# Patient Record
Sex: FEMALE | Race: WHITE | Employment: UNEMPLOYED | URBAN - NONMETROPOLITAN AREA
[De-identification: names, ages, dates, MRNs, and addresses within clinical notes are randomized per-mention and may not be internally consistent; named-entity substitution may affect disease eponyms.]

---

## 2017-04-16 PROBLEM — R17 JAUNDICE: Status: ACTIVE | Noted: 2017-04-16

## 2017-04-16 PROBLEM — K83.09 ASCENDING CHOLANGITIS: Status: ACTIVE | Noted: 2017-04-16

## 2017-04-16 PROBLEM — J98.11 BILATERAL ATELECTASIS: Status: ACTIVE | Noted: 2017-04-16

## 2017-04-16 PROBLEM — N17.9 AKI (ACUTE KIDNEY INJURY) (HCC): Status: ACTIVE | Noted: 2017-04-16

## 2017-04-25 PROBLEM — R17 JAUNDICE: Status: RESOLVED | Noted: 2017-04-16 | Resolved: 2017-04-25

## 2017-04-25 PROBLEM — Z98.890 HISTORY OF CARDIAC RADIOFREQUENCY ABLATION: Status: ACTIVE | Noted: 2017-04-25

## 2017-05-17 ENCOUNTER — OFFICE VISIT (OUTPATIENT)
Age: 77
End: 2017-05-17

## 2017-05-17 VITALS
HEART RATE: 68 BPM | DIASTOLIC BLOOD PRESSURE: 62 MMHG | TEMPERATURE: 99.2 F | HEIGHT: 65 IN | RESPIRATION RATE: 24 BRPM | SYSTOLIC BLOOD PRESSURE: 120 MMHG | OXYGEN SATURATION: 96 %

## 2017-05-17 DIAGNOSIS — Z90.49 S/P LAPAROSCOPIC CHOLECYSTECTOMY: Primary | ICD-10-CM

## 2017-05-17 PROCEDURE — 99024 POSTOP FOLLOW-UP VISIT: CPT | Performed by: NURSE PRACTITIONER

## 2017-05-17 RX ORDER — HYDROCODONE BITARTRATE AND ACETAMINOPHEN 5; 325 MG/1; MG/1
1 TABLET ORAL EVERY 6 HOURS PRN
Status: ON HOLD | COMMUNITY
End: 2018-04-02

## 2017-05-17 ASSESSMENT — ENCOUNTER SYMPTOMS
ANAL BLEEDING: 0
EYE REDNESS: 0
APNEA: 0
SHORTNESS OF BREATH: 1
WHEEZING: 0
RECTAL PAIN: 0
COUGH: 0
COLOR CHANGE: 0
RHINORRHEA: 0
STRIDOR: 0
EYE ITCHING: 0
SORE THROAT: 0
FACIAL SWELLING: 0
CHOKING: 0
CHEST TIGHTNESS: 0
CONSTIPATION: 0
VOICE CHANGE: 0
EYE PAIN: 0
ABDOMINAL PAIN: 0
ABDOMINAL DISTENTION: 0
PHOTOPHOBIA: 0
EYE DISCHARGE: 0
SINUS PRESSURE: 0
BACK PAIN: 0
TROUBLE SWALLOWING: 0
BLOOD IN STOOL: 0
VOMITING: 0
DIARRHEA: 1

## 2018-03-27 ENCOUNTER — HOSPITAL ENCOUNTER (INPATIENT)
Age: 78
LOS: 6 days | Discharge: NURSING, REHAB OR LONG-TERM CARE | DRG: 193 | End: 2018-04-02
Attending: FAMILY MEDICINE | Admitting: PSYCHIATRY & NEUROLOGY
Payer: MEDICARE

## 2018-03-27 ENCOUNTER — APPOINTMENT (OUTPATIENT)
Dept: GENERAL RADIOLOGY | Age: 78
DRG: 193 | End: 2018-03-27
Payer: MEDICARE

## 2018-03-27 DIAGNOSIS — J11.1 INFLUENZA WITH RESPIRATORY MANIFESTATION OTHER THAN PNEUMONIA: Primary | ICD-10-CM

## 2018-03-27 DIAGNOSIS — G44.1 OTHER VASCULAR HEADACHE: ICD-10-CM

## 2018-03-27 DIAGNOSIS — L60.0 INGROWN NAIL: ICD-10-CM

## 2018-03-27 LAB
ALBUMIN SERPL-MCNC: 3.6 GM/DL (ref 3.4–5)
ALP BLD-CCNC: 93 U/L (ref 46–116)
ALT SERPL-CCNC: 27 U/L (ref 14–63)
ANION GAP: 6 MEQ/L (ref 8–16)
AST SERPL-CCNC: 25 U/L (ref 15–37)
BASOPHILS # BLD: 0.5 % (ref 0–3)
BILIRUB SERPL-MCNC: 0.9 MG/DL (ref 0.2–1)
BUN BLDV-MCNC: 16 MG/DL (ref 7–18)
CHLORIDE BLD-SCNC: 106 MEQ/L (ref 98–107)
CO2: 30 MEQ/L (ref 21–32)
CREAT SERPL-MCNC: 1.2 MG/DL (ref 0.6–1.3)
EOSINOPHILS RELATIVE PERCENT: 0.9 % (ref 0–4)
FLU A ANTIGEN: POSITIVE
FLU B ANTIGEN: NEGATIVE
GFR, ESTIMATED: 46 ML/MIN/1.73M2
GLUCOSE BLD-MCNC: 123 MG/DL (ref 74–106)
HCT VFR BLD CALC: 37.6 % (ref 37–47)
HEMOGLOBIN: 12.5 GM/DL (ref 12–16)
LACTATE: 0.85 MMOL/L (ref 0.9–1.7)
LYMPHOCYTES # BLD: 6.9 % (ref 15–47)
MCH RBC QN AUTO: 31.5 PG (ref 27–31)
MCHC RBC AUTO-ENTMCNC: 33.3 GM/DL (ref 33–37)
MCV RBC AUTO: 94.8 FL (ref 81–99)
MONOCYTES: 8.7 % (ref 0–12)
PDW BLD-RTO: 12.9 % (ref 11.5–14.5)
PLATELET # BLD: 140 THOU/MM3 (ref 130–400)
PMV BLD AUTO: 7.2 FL (ref 7.4–10.4)
POC CALCIUM: 8.9 MG/DL (ref 8.5–10.1)
POTASSIUM SERPL-SCNC: 4.4 MEQ/L (ref 3.5–5.1)
RBC # BLD: 3.96 MILL/MM3 (ref 4.2–5.4)
SEGS: 83 % (ref 43–75)
SODIUM BLD-SCNC: 142 MEQ/L (ref 136–145)
TOTAL PROTEIN: 7.6 GM/DL (ref 6.4–8.2)
TROPONIN I: < 0.017 NG/ML (ref 0.02–0.05)
WBC # BLD: 9.7 THOU/MM3 (ref 4.8–10.8)

## 2018-03-27 PROCEDURE — 85025 COMPLETE CBC W/AUTO DIFF WBC: CPT

## 2018-03-27 PROCEDURE — 80053 COMPREHEN METABOLIC PANEL: CPT

## 2018-03-27 PROCEDURE — 94640 AIRWAY INHALATION TREATMENT: CPT

## 2018-03-27 PROCEDURE — 6360000002 HC RX W HCPCS: Performed by: FAMILY MEDICINE

## 2018-03-27 PROCEDURE — 87804 INFLUENZA ASSAY W/OPTIC: CPT

## 2018-03-27 PROCEDURE — 6370000000 HC RX 637 (ALT 250 FOR IP): Performed by: FAMILY MEDICINE

## 2018-03-27 PROCEDURE — 71045 X-RAY EXAM CHEST 1 VIEW: CPT

## 2018-03-27 PROCEDURE — 87040 BLOOD CULTURE FOR BACTERIA: CPT

## 2018-03-27 PROCEDURE — 36415 COLL VENOUS BLD VENIPUNCTURE: CPT

## 2018-03-27 PROCEDURE — 1200000000 HC SEMI PRIVATE

## 2018-03-27 PROCEDURE — 84484 ASSAY OF TROPONIN QUANT: CPT

## 2018-03-27 PROCEDURE — 96374 THER/PROPH/DIAG INJ IV PUSH: CPT

## 2018-03-27 PROCEDURE — 99284 EMERGENCY DEPT VISIT MOD MDM: CPT

## 2018-03-27 PROCEDURE — 2580000003 HC RX 258: Performed by: FAMILY MEDICINE

## 2018-03-27 PROCEDURE — A4614 HAND-HELD PEFR METER: HCPCS

## 2018-03-27 PROCEDURE — 83605 ASSAY OF LACTIC ACID: CPT

## 2018-03-27 RX ORDER — ACETAMINOPHEN 325 MG/1
650 TABLET ORAL EVERY 4 HOURS PRN
Status: DISCONTINUED | OUTPATIENT
Start: 2018-03-27 | End: 2018-04-02 | Stop reason: HOSPADM

## 2018-03-27 RX ORDER — ACETAMINOPHEN 500 MG
1000 TABLET ORAL ONCE
Status: COMPLETED | OUTPATIENT
Start: 2018-03-27 | End: 2018-03-27

## 2018-03-27 RX ORDER — OSELTAMIVIR PHOSPHATE 30 MG/1
30 CAPSULE ORAL ONCE
Status: COMPLETED | OUTPATIENT
Start: 2018-03-27 | End: 2018-03-27

## 2018-03-27 RX ORDER — IPRATROPIUM BROMIDE AND ALBUTEROL SULFATE 2.5; .5 MG/3ML; MG/3ML
1 SOLUTION RESPIRATORY (INHALATION) ONCE
Status: COMPLETED | OUTPATIENT
Start: 2018-03-27 | End: 2018-03-27

## 2018-03-27 RX ORDER — METHYLPREDNISOLONE SODIUM SUCCINATE 125 MG/2ML
125 INJECTION, POWDER, LYOPHILIZED, FOR SOLUTION INTRAMUSCULAR; INTRAVENOUS ONCE
Status: COMPLETED | OUTPATIENT
Start: 2018-03-27 | End: 2018-03-27

## 2018-03-27 RX ORDER — 0.9 % SODIUM CHLORIDE 0.9 %
1000 INTRAVENOUS SOLUTION INTRAVENOUS ONCE
Status: COMPLETED | OUTPATIENT
Start: 2018-03-27 | End: 2018-03-27

## 2018-03-27 RX ADMIN — IPRATROPIUM BROMIDE AND ALBUTEROL SULFATE 1 AMPULE: .5; 3 SOLUTION RESPIRATORY (INHALATION) at 19:39

## 2018-03-27 RX ADMIN — IPRATROPIUM BROMIDE AND ALBUTEROL SULFATE 1 AMPULE: .5; 3 SOLUTION RESPIRATORY (INHALATION) at 18:30

## 2018-03-27 RX ADMIN — SODIUM CHLORIDE 1000 ML: 9 INJECTION, SOLUTION INTRAVENOUS at 21:07

## 2018-03-27 RX ADMIN — ACETAMINOPHEN 1000 MG: 500 TABLET ORAL at 18:14

## 2018-03-27 RX ADMIN — OSELTAMIVIR PHOSPHATE 30 MG: 30 CAPSULE ORAL at 21:10

## 2018-03-27 RX ADMIN — METHYLPREDNISOLONE SODIUM SUCCINATE 125 MG: 125 INJECTION, POWDER, FOR SOLUTION INTRAMUSCULAR; INTRAVENOUS at 18:25

## 2018-03-27 ASSESSMENT — ENCOUNTER SYMPTOMS
EYE DISCHARGE: 0
NAUSEA: 0
SHORTNESS OF BREATH: 0
COUGH: 1
BACK PAIN: 0
ABDOMINAL PAIN: 0
DIARRHEA: 0
EYE PAIN: 0
RHINORRHEA: 0
WHEEZING: 0
SORE THROAT: 0
VOMITING: 0

## 2018-03-27 ASSESSMENT — PAIN SCALES - GENERAL: PAINLEVEL_OUTOF10: 0

## 2018-03-28 ENCOUNTER — APPOINTMENT (OUTPATIENT)
Dept: CT IMAGING | Age: 78
DRG: 193 | End: 2018-03-28
Payer: MEDICARE

## 2018-03-28 LAB
ALLEN TEST: POSITIVE
BASE EXCESS (CALCULATED): -3 MMOL/L (ref -2.5–2.5)
COLLECTED BY:: ABNORMAL
DEVICE: ABNORMAL
HCO3: 22 MMOL/L (ref 23–28)
O2 SATURATION: 93 %
PCO2: 40 MMHG (ref 35–45)
PH BLOOD GAS: 7.35 (ref 7.35–7.45)
PO2: 72 MMHG (ref 71–104)
PROCALCITONIN: 0.17 NG/ML (ref 0.01–0.09)
SOURCE, BLOOD GAS: ABNORMAL

## 2018-03-28 PROCEDURE — 99223 1ST HOSP IP/OBS HIGH 75: CPT | Performed by: INTERNAL MEDICINE

## 2018-03-28 PROCEDURE — 84145 PROCALCITONIN (PCT): CPT

## 2018-03-28 PROCEDURE — 6370000000 HC RX 637 (ALT 250 FOR IP): Performed by: INTERNAL MEDICINE

## 2018-03-28 PROCEDURE — 1200000000 HC SEMI PRIVATE

## 2018-03-28 PROCEDURE — 2700000000 HC OXYGEN THERAPY PER DAY

## 2018-03-28 PROCEDURE — 94640 AIRWAY INHALATION TREATMENT: CPT

## 2018-03-28 PROCEDURE — 82803 BLOOD GASES ANY COMBINATION: CPT

## 2018-03-28 PROCEDURE — 36415 COLL VENOUS BLD VENIPUNCTURE: CPT

## 2018-03-28 PROCEDURE — 6360000002 HC RX W HCPCS: Performed by: NURSE PRACTITIONER

## 2018-03-28 PROCEDURE — 71250 CT THORAX DX C-: CPT

## 2018-03-28 PROCEDURE — APPSS45 APP SPLIT SHARED TIME 31-45 MINUTES: Performed by: NURSE PRACTITIONER

## 2018-03-28 PROCEDURE — 2580000003 HC RX 258: Performed by: INTERNAL MEDICINE

## 2018-03-28 PROCEDURE — 6360000002 HC RX W HCPCS: Performed by: INTERNAL MEDICINE

## 2018-03-28 PROCEDURE — A4614 HAND-HELD PEFR METER: HCPCS

## 2018-03-28 PROCEDURE — 36600 WITHDRAWAL OF ARTERIAL BLOOD: CPT

## 2018-03-28 PROCEDURE — 2500000003 HC RX 250 WO HCPCS: Performed by: INTERNAL MEDICINE

## 2018-03-28 PROCEDURE — 94669 MECHANICAL CHEST WALL OSCILL: CPT

## 2018-03-28 RX ORDER — HYDROCODONE BITARTRATE AND ACETAMINOPHEN 5; 325 MG/1; MG/1
1 TABLET ORAL EVERY 6 HOURS PRN
Status: DISCONTINUED | OUTPATIENT
Start: 2018-03-28 | End: 2018-04-02 | Stop reason: HOSPADM

## 2018-03-28 RX ORDER — METOPROLOL TARTRATE 75 MG/1
75 TABLET, FILM COATED ORAL 2 TIMES DAILY
Status: DISCONTINUED | OUTPATIENT
Start: 2018-03-28 | End: 2018-03-28 | Stop reason: SDUPTHER

## 2018-03-28 RX ORDER — POTASSIUM CHLORIDE 20 MEQ/1
20 TABLET, EXTENDED RELEASE ORAL DAILY
COMMUNITY

## 2018-03-28 RX ORDER — FOLIC ACID 1 MG/1
1 TABLET ORAL DAILY
Status: DISCONTINUED | OUTPATIENT
Start: 2018-03-28 | End: 2018-04-02 | Stop reason: HOSPADM

## 2018-03-28 RX ORDER — CITALOPRAM 20 MG/1
20 TABLET ORAL DAILY
Status: DISCONTINUED | OUTPATIENT
Start: 2018-03-28 | End: 2018-04-02 | Stop reason: HOSPADM

## 2018-03-28 RX ORDER — BUMETANIDE 1 MG/1
2 TABLET ORAL DAILY
Status: DISCONTINUED | OUTPATIENT
Start: 2018-03-28 | End: 2018-04-02

## 2018-03-28 RX ORDER — ASPIRIN 81 MG/1
81 TABLET ORAL DAILY
COMMUNITY

## 2018-03-28 RX ORDER — HYDRALAZINE HYDROCHLORIDE 50 MG/1
50 TABLET, FILM COATED ORAL EVERY 8 HOURS SCHEDULED
Status: DISCONTINUED | OUTPATIENT
Start: 2018-03-28 | End: 2018-04-02 | Stop reason: HOSPADM

## 2018-03-28 RX ORDER — DOCUSATE SODIUM 100 MG/1
100 CAPSULE, LIQUID FILLED ORAL 2 TIMES DAILY
Status: DISCONTINUED | OUTPATIENT
Start: 2018-03-28 | End: 2018-04-02 | Stop reason: HOSPADM

## 2018-03-28 RX ORDER — METHYLPREDNISOLONE SODIUM SUCCINATE 125 MG/2ML
80 INJECTION, POWDER, LYOPHILIZED, FOR SOLUTION INTRAMUSCULAR; INTRAVENOUS EVERY 8 HOURS
Status: DISCONTINUED | OUTPATIENT
Start: 2018-03-28 | End: 2018-03-28

## 2018-03-28 RX ORDER — ALLOPURINOL 100 MG/1
100 TABLET ORAL DAILY
Status: DISCONTINUED | OUTPATIENT
Start: 2018-03-28 | End: 2018-04-02 | Stop reason: HOSPADM

## 2018-03-28 RX ORDER — PRAVASTATIN SODIUM 40 MG
40 TABLET ORAL NIGHTLY
Status: DISCONTINUED | OUTPATIENT
Start: 2018-03-28 | End: 2018-04-02 | Stop reason: HOSPADM

## 2018-03-28 RX ORDER — POTASSIUM CHLORIDE 20 MEQ/1
20 TABLET, EXTENDED RELEASE ORAL DAILY
Status: DISCONTINUED | OUTPATIENT
Start: 2018-03-28 | End: 2018-04-02 | Stop reason: HOSPADM

## 2018-03-28 RX ORDER — OSELTAMIVIR PHOSPHATE 30 MG/1
30 CAPSULE ORAL 2 TIMES DAILY
Status: COMPLETED | OUTPATIENT
Start: 2018-03-28 | End: 2018-04-01

## 2018-03-28 RX ORDER — AMLODIPINE BESYLATE 10 MG/1
10 TABLET ORAL DAILY
Status: DISCONTINUED | OUTPATIENT
Start: 2018-03-28 | End: 2018-04-02 | Stop reason: HOSPADM

## 2018-03-28 RX ORDER — OXYBUTYNIN CHLORIDE 5 MG/1
5 TABLET ORAL 3 TIMES DAILY
Status: DISCONTINUED | OUTPATIENT
Start: 2018-03-28 | End: 2018-04-02 | Stop reason: HOSPADM

## 2018-03-28 RX ORDER — PANTOPRAZOLE SODIUM 40 MG/1
40 TABLET, DELAYED RELEASE ORAL
Status: DISCONTINUED | OUTPATIENT
Start: 2018-03-28 | End: 2018-04-02 | Stop reason: HOSPADM

## 2018-03-28 RX ORDER — CELECOXIB 200 MG/1
200 CAPSULE ORAL 2 TIMES DAILY
Status: DISCONTINUED | OUTPATIENT
Start: 2018-03-28 | End: 2018-04-02 | Stop reason: HOSPADM

## 2018-03-28 RX ORDER — LEVOTHYROXINE SODIUM 0.1 MG/1
200 TABLET ORAL DAILY
Status: DISCONTINUED | OUTPATIENT
Start: 2018-03-28 | End: 2018-04-02 | Stop reason: HOSPADM

## 2018-03-28 RX ORDER — LISINOPRIL 20 MG/1
20 TABLET ORAL 2 TIMES DAILY
Status: DISCONTINUED | OUTPATIENT
Start: 2018-03-28 | End: 2018-04-02 | Stop reason: HOSPADM

## 2018-03-28 RX ORDER — METHYLPREDNISOLONE SODIUM SUCCINATE 40 MG/ML
40 INJECTION, POWDER, LYOPHILIZED, FOR SOLUTION INTRAMUSCULAR; INTRAVENOUS EVERY 12 HOURS
Status: DISCONTINUED | OUTPATIENT
Start: 2018-03-28 | End: 2018-03-29

## 2018-03-28 RX ORDER — IPRATROPIUM BROMIDE AND ALBUTEROL SULFATE 2.5; .5 MG/3ML; MG/3ML
1 SOLUTION RESPIRATORY (INHALATION)
Status: DISCONTINUED | OUTPATIENT
Start: 2018-03-28 | End: 2018-04-02 | Stop reason: HOSPADM

## 2018-03-28 RX ADMIN — CELECOXIB 200 MG: 200 CAPSULE ORAL at 20:03

## 2018-03-28 RX ADMIN — HYDROCODONE BITARTRATE AND ACETAMINOPHEN 1 TABLET: 5; 325 TABLET ORAL at 02:55

## 2018-03-28 RX ADMIN — LEVOTHYROXINE SODIUM 200 MCG: 100 TABLET ORAL at 05:45

## 2018-03-28 RX ADMIN — MICONAZOLE NITRATE: 2 POWDER TOPICAL at 20:02

## 2018-03-28 RX ADMIN — HYDRALAZINE HYDROCHLORIDE 50 MG: 50 TABLET ORAL at 20:07

## 2018-03-28 RX ADMIN — METOPROLOL TARTRATE 75 MG: 25 TABLET ORAL at 02:55

## 2018-03-28 RX ADMIN — ALLOPURINOL 100 MG: 100 TABLET ORAL at 09:22

## 2018-03-28 RX ADMIN — IPRATROPIUM BROMIDE AND ALBUTEROL SULFATE 1 AMPULE: .5; 3 SOLUTION RESPIRATORY (INHALATION) at 16:13

## 2018-03-28 RX ADMIN — AZITHROMYCIN MONOHYDRATE 500 MG: 500 INJECTION, POWDER, LYOPHILIZED, FOR SOLUTION INTRAVENOUS at 04:28

## 2018-03-28 RX ADMIN — CITALOPRAM 20 MG: 20 TABLET, FILM COATED ORAL at 10:34

## 2018-03-28 RX ADMIN — DOCUSATE SODIUM 100 MG: 100 CAPSULE ORAL at 02:55

## 2018-03-28 RX ADMIN — HYDRALAZINE HYDROCHLORIDE 50 MG: 50 TABLET ORAL at 14:42

## 2018-03-28 RX ADMIN — POTASSIUM CHLORIDE 20 MEQ: 1500 TABLET, EXTENDED RELEASE ORAL at 09:23

## 2018-03-28 RX ADMIN — METOPROLOL TARTRATE 75 MG: 25 TABLET ORAL at 09:20

## 2018-03-28 RX ADMIN — IPRATROPIUM BROMIDE AND ALBUTEROL SULFATE 1 AMPULE: .5; 3 SOLUTION RESPIRATORY (INHALATION) at 20:27

## 2018-03-28 RX ADMIN — ENOXAPARIN SODIUM 40 MG: 40 INJECTION SUBCUTANEOUS at 09:20

## 2018-03-28 RX ADMIN — OXYBUTYNIN CHLORIDE 5 MG: 5 TABLET ORAL at 09:22

## 2018-03-28 RX ADMIN — DOCUSATE SODIUM 100 MG: 100 CAPSULE ORAL at 20:04

## 2018-03-28 RX ADMIN — METHYLPREDNISOLONE SODIUM SUCCINATE 40 MG: 40 INJECTION, POWDER, FOR SOLUTION INTRAMUSCULAR; INTRAVENOUS at 20:03

## 2018-03-28 RX ADMIN — LISINOPRIL 20 MG: 20 TABLET ORAL at 02:55

## 2018-03-28 RX ADMIN — IPRATROPIUM BROMIDE AND ALBUTEROL SULFATE 1 AMPULE: .5; 3 SOLUTION RESPIRATORY (INHALATION) at 11:50

## 2018-03-28 RX ADMIN — OXYBUTYNIN CHLORIDE 5 MG: 5 TABLET ORAL at 20:02

## 2018-03-28 RX ADMIN — AMLODIPINE BESYLATE 10 MG: 10 TABLET ORAL at 09:22

## 2018-03-28 RX ADMIN — PANTOPRAZOLE SODIUM 40 MG: 40 TABLET, DELAYED RELEASE ORAL at 05:45

## 2018-03-28 RX ADMIN — CEFTRIAXONE 1 G: 1 INJECTION, POWDER, FOR SOLUTION INTRAMUSCULAR; INTRAVENOUS at 03:57

## 2018-03-28 RX ADMIN — HYDRALAZINE HYDROCHLORIDE 50 MG: 50 TABLET ORAL at 09:22

## 2018-03-28 RX ADMIN — OSELTAMIVIR PHOSPHATE 30 MG: 30 CAPSULE ORAL at 09:23

## 2018-03-28 RX ADMIN — LISINOPRIL 20 MG: 20 TABLET ORAL at 09:22

## 2018-03-28 RX ADMIN — METOPROLOL TARTRATE 75 MG: 25 TABLET ORAL at 20:02

## 2018-03-28 RX ADMIN — CELECOXIB 200 MG: 200 CAPSULE ORAL at 09:23

## 2018-03-28 RX ADMIN — LISINOPRIL 20 MG: 20 TABLET ORAL at 20:02

## 2018-03-28 RX ADMIN — METHYLPREDNISOLONE SODIUM SUCCINATE 80 MG: 125 INJECTION, POWDER, FOR SOLUTION INTRAMUSCULAR; INTRAVENOUS at 09:25

## 2018-03-28 RX ADMIN — IPRATROPIUM BROMIDE AND ALBUTEROL SULFATE 1 AMPULE: .5; 3 SOLUTION RESPIRATORY (INHALATION) at 00:42

## 2018-03-28 RX ADMIN — OXYBUTYNIN CHLORIDE 5 MG: 5 TABLET ORAL at 14:42

## 2018-03-28 RX ADMIN — FOLIC ACID 1 MG: 1 TABLET ORAL at 09:22

## 2018-03-28 RX ADMIN — OSELTAMIVIR PHOSPHATE 30 MG: 30 CAPSULE ORAL at 20:02

## 2018-03-28 RX ADMIN — DOCUSATE SODIUM 100 MG: 100 CAPSULE ORAL at 09:22

## 2018-03-28 RX ADMIN — IPRATROPIUM BROMIDE AND ALBUTEROL SULFATE 1 AMPULE: .5; 3 SOLUTION RESPIRATORY (INHALATION) at 07:13

## 2018-03-28 RX ADMIN — MICONAZOLE NITRATE: 2 POWDER TOPICAL at 17:01

## 2018-03-28 RX ADMIN — PRAVASTATIN SODIUM 40 MG: 40 TABLET ORAL at 20:02

## 2018-03-28 RX ADMIN — BUMETANIDE 2 MG: 1 TABLET ORAL at 09:21

## 2018-03-28 ASSESSMENT — PAIN SCALES - GENERAL
PAINLEVEL_OUTOF10: 2
PAINLEVEL_OUTOF10: 0
PAINLEVEL_OUTOF10: 3
PAINLEVEL_OUTOF10: 4
PAINLEVEL_OUTOF10: 3
PAINLEVEL_OUTOF10: 0

## 2018-03-28 ASSESSMENT — PAIN DESCRIPTION - LOCATION
LOCATION: HEAD

## 2018-03-28 ASSESSMENT — PAIN DESCRIPTION - PAIN TYPE
TYPE: ACUTE PAIN

## 2018-03-29 PROBLEM — L60.0 INGROWN NAIL: Status: ACTIVE | Noted: 2018-03-29

## 2018-03-29 LAB
LV EF: 55 %
LVEF MODALITY: NORMAL

## 2018-03-29 PROCEDURE — G8978 MOBILITY CURRENT STATUS: HCPCS

## 2018-03-29 PROCEDURE — 94669 MECHANICAL CHEST WALL OSCILL: CPT

## 2018-03-29 PROCEDURE — 1200000000 HC SEMI PRIVATE

## 2018-03-29 PROCEDURE — 6370000000 HC RX 637 (ALT 250 FOR IP): Performed by: INTERNAL MEDICINE

## 2018-03-29 PROCEDURE — 93306 TTE W/DOPPLER COMPLETE: CPT

## 2018-03-29 PROCEDURE — G8988 SELF CARE GOAL STATUS: HCPCS

## 2018-03-29 PROCEDURE — 2580000003 HC RX 258: Performed by: INTERNAL MEDICINE

## 2018-03-29 PROCEDURE — G8979 MOBILITY GOAL STATUS: HCPCS

## 2018-03-29 PROCEDURE — 97161 PT EVAL LOW COMPLEX 20 MIN: CPT

## 2018-03-29 PROCEDURE — G8987 SELF CARE CURRENT STATUS: HCPCS

## 2018-03-29 PROCEDURE — 97535 SELF CARE MNGMENT TRAINING: CPT

## 2018-03-29 PROCEDURE — APPSS30 APP SPLIT SHARED TIME 16-30 MINUTES: Performed by: NURSE PRACTITIONER

## 2018-03-29 PROCEDURE — 99232 SBSQ HOSP IP/OBS MODERATE 35: CPT | Performed by: INTERNAL MEDICINE

## 2018-03-29 PROCEDURE — 97166 OT EVAL MOD COMPLEX 45 MIN: CPT

## 2018-03-29 PROCEDURE — 6360000002 HC RX W HCPCS: Performed by: INTERNAL MEDICINE

## 2018-03-29 PROCEDURE — 2700000000 HC OXYGEN THERAPY PER DAY

## 2018-03-29 PROCEDURE — 94640 AIRWAY INHALATION TREATMENT: CPT

## 2018-03-29 PROCEDURE — 6360000002 HC RX W HCPCS: Performed by: NURSE PRACTITIONER

## 2018-03-29 PROCEDURE — 97110 THERAPEUTIC EXERCISES: CPT

## 2018-03-29 RX ORDER — LIDOCAINE HYDROCHLORIDE 10 MG/ML
5 INJECTION, SOLUTION INFILTRATION; PERINEURAL ONCE
Status: DISCONTINUED | OUTPATIENT
Start: 2018-03-30 | End: 2018-04-02 | Stop reason: HOSPADM

## 2018-03-29 RX ORDER — PREDNISONE 20 MG/1
40 TABLET ORAL DAILY
Status: DISCONTINUED | OUTPATIENT
Start: 2018-03-30 | End: 2018-04-02 | Stop reason: HOSPADM

## 2018-03-29 RX ADMIN — BUMETANIDE 2 MG: 1 TABLET ORAL at 09:25

## 2018-03-29 RX ADMIN — CITALOPRAM 20 MG: 20 TABLET, FILM COATED ORAL at 09:27

## 2018-03-29 RX ADMIN — DOCUSATE SODIUM 100 MG: 100 CAPSULE ORAL at 09:26

## 2018-03-29 RX ADMIN — AMLODIPINE BESYLATE 10 MG: 10 TABLET ORAL at 09:26

## 2018-03-29 RX ADMIN — METHYLPREDNISOLONE SODIUM SUCCINATE 40 MG: 40 INJECTION, POWDER, FOR SOLUTION INTRAMUSCULAR; INTRAVENOUS at 09:27

## 2018-03-29 RX ADMIN — CEFTRIAXONE 1 G: 1 INJECTION, POWDER, FOR SOLUTION INTRAMUSCULAR; INTRAVENOUS at 02:15

## 2018-03-29 RX ADMIN — IPRATROPIUM BROMIDE AND ALBUTEROL SULFATE 1 AMPULE: .5; 3 SOLUTION RESPIRATORY (INHALATION) at 16:22

## 2018-03-29 RX ADMIN — IPRATROPIUM BROMIDE AND ALBUTEROL SULFATE 1 AMPULE: .5; 3 SOLUTION RESPIRATORY (INHALATION) at 12:31

## 2018-03-29 RX ADMIN — PRAVASTATIN SODIUM 40 MG: 40 TABLET ORAL at 22:35

## 2018-03-29 RX ADMIN — DOCUSATE SODIUM 100 MG: 100 CAPSULE ORAL at 22:31

## 2018-03-29 RX ADMIN — OXYBUTYNIN CHLORIDE 5 MG: 5 TABLET ORAL at 09:27

## 2018-03-29 RX ADMIN — AZITHROMYCIN MONOHYDRATE 500 MG: 500 INJECTION, POWDER, LYOPHILIZED, FOR SOLUTION INTRAVENOUS at 02:48

## 2018-03-29 RX ADMIN — MICONAZOLE NITRATE: 2 POWDER TOPICAL at 22:35

## 2018-03-29 RX ADMIN — HYDRALAZINE HYDROCHLORIDE 50 MG: 50 TABLET ORAL at 14:32

## 2018-03-29 RX ADMIN — ALLOPURINOL 100 MG: 100 TABLET ORAL at 09:27

## 2018-03-29 RX ADMIN — HYDRALAZINE HYDROCHLORIDE 50 MG: 50 TABLET ORAL at 06:10

## 2018-03-29 RX ADMIN — HYDRALAZINE HYDROCHLORIDE 50 MG: 50 TABLET ORAL at 22:31

## 2018-03-29 RX ADMIN — LISINOPRIL 20 MG: 20 TABLET ORAL at 22:31

## 2018-03-29 RX ADMIN — CELECOXIB 200 MG: 200 CAPSULE ORAL at 09:28

## 2018-03-29 RX ADMIN — OXYBUTYNIN CHLORIDE 5 MG: 5 TABLET ORAL at 14:31

## 2018-03-29 RX ADMIN — LEVOTHYROXINE SODIUM 200 MCG: 100 TABLET ORAL at 06:10

## 2018-03-29 RX ADMIN — OSELTAMIVIR PHOSPHATE 30 MG: 30 CAPSULE ORAL at 09:26

## 2018-03-29 RX ADMIN — OXYBUTYNIN CHLORIDE 5 MG: 5 TABLET ORAL at 22:31

## 2018-03-29 RX ADMIN — LISINOPRIL 20 MG: 20 TABLET ORAL at 09:27

## 2018-03-29 RX ADMIN — HYDROCODONE BITARTRATE AND ACETAMINOPHEN 1 TABLET: 5; 325 TABLET ORAL at 22:31

## 2018-03-29 RX ADMIN — CELECOXIB 200 MG: 200 CAPSULE ORAL at 22:32

## 2018-03-29 RX ADMIN — OSELTAMIVIR PHOSPHATE 30 MG: 30 CAPSULE ORAL at 22:31

## 2018-03-29 RX ADMIN — MICONAZOLE NITRATE 1 APPLICATOR: 2 POWDER TOPICAL at 09:27

## 2018-03-29 RX ADMIN — METOPROLOL TARTRATE 75 MG: 25 TABLET ORAL at 09:21

## 2018-03-29 RX ADMIN — ENOXAPARIN SODIUM 40 MG: 40 INJECTION SUBCUTANEOUS at 09:32

## 2018-03-29 RX ADMIN — IPRATROPIUM BROMIDE AND ALBUTEROL SULFATE 1 AMPULE: .5; 3 SOLUTION RESPIRATORY (INHALATION) at 07:27

## 2018-03-29 RX ADMIN — FOLIC ACID 1 MG: 1 TABLET ORAL at 09:27

## 2018-03-29 RX ADMIN — IPRATROPIUM BROMIDE AND ALBUTEROL SULFATE 1 AMPULE: .5; 3 SOLUTION RESPIRATORY (INHALATION) at 20:25

## 2018-03-29 RX ADMIN — POTASSIUM CHLORIDE 20 MEQ: 1500 TABLET, EXTENDED RELEASE ORAL at 09:25

## 2018-03-29 RX ADMIN — METOPROLOL TARTRATE 75 MG: 25 TABLET ORAL at 22:31

## 2018-03-29 RX ADMIN — PANTOPRAZOLE SODIUM 40 MG: 40 TABLET, DELAYED RELEASE ORAL at 06:10

## 2018-03-29 ASSESSMENT — PAIN SCALES - GENERAL
PAINLEVEL_OUTOF10: 6
PAINLEVEL_OUTOF10: 3
PAINLEVEL_OUTOF10: 6
PAINLEVEL_OUTOF10: 6
PAINLEVEL_OUTOF10: 3
PAINLEVEL_OUTOF10: 3

## 2018-03-29 ASSESSMENT — PAIN DESCRIPTION - LOCATION: LOCATION: HEAD

## 2018-03-29 ASSESSMENT — PAIN DESCRIPTION - PAIN TYPE: TYPE: ACUTE PAIN

## 2018-03-30 LAB
ANION GAP SERPL CALCULATED.3IONS-SCNC: 12 MEQ/L (ref 8–16)
BUN BLDV-MCNC: 24 MG/DL (ref 7–22)
CALCIUM SERPL-MCNC: 8.8 MG/DL (ref 8.5–10.5)
CHLORIDE BLD-SCNC: 102 MEQ/L (ref 98–111)
CO2: 28 MEQ/L (ref 23–33)
CREAT SERPL-MCNC: 1 MG/DL (ref 0.4–1.2)
GFR SERPL CREATININE-BSD FRML MDRD: 53 ML/MIN/1.73M2
GLUCOSE BLD-MCNC: 113 MG/DL (ref 70–108)
POTASSIUM SERPL-SCNC: 3.5 MEQ/L (ref 3.5–5.2)
SODIUM BLD-SCNC: 142 MEQ/L (ref 135–145)

## 2018-03-30 PROCEDURE — 94669 MECHANICAL CHEST WALL OSCILL: CPT

## 2018-03-30 PROCEDURE — 6360000002 HC RX W HCPCS: Performed by: INTERNAL MEDICINE

## 2018-03-30 PROCEDURE — 6370000000 HC RX 637 (ALT 250 FOR IP): Performed by: INTERNAL MEDICINE

## 2018-03-30 PROCEDURE — 0HDRXZZ EXTRACTION OF TOE NAIL, EXTERNAL APPROACH: ICD-10-PCS | Performed by: INTERNAL MEDICINE

## 2018-03-30 PROCEDURE — 1200000000 HC SEMI PRIVATE

## 2018-03-30 PROCEDURE — 80048 BASIC METABOLIC PNL TOTAL CA: CPT

## 2018-03-30 PROCEDURE — 36415 COLL VENOUS BLD VENIPUNCTURE: CPT

## 2018-03-30 PROCEDURE — 2580000003 HC RX 258: Performed by: INTERNAL MEDICINE

## 2018-03-30 PROCEDURE — 94640 AIRWAY INHALATION TREATMENT: CPT

## 2018-03-30 PROCEDURE — 99232 SBSQ HOSP IP/OBS MODERATE 35: CPT | Performed by: INTERNAL MEDICINE

## 2018-03-30 PROCEDURE — 2700000000 HC OXYGEN THERAPY PER DAY

## 2018-03-30 RX ADMIN — IPRATROPIUM BROMIDE AND ALBUTEROL SULFATE 1 AMPULE: .5; 3 SOLUTION RESPIRATORY (INHALATION) at 12:49

## 2018-03-30 RX ADMIN — PANTOPRAZOLE SODIUM 40 MG: 40 TABLET, DELAYED RELEASE ORAL at 10:24

## 2018-03-30 RX ADMIN — POTASSIUM CHLORIDE 20 MEQ: 1500 TABLET, EXTENDED RELEASE ORAL at 10:25

## 2018-03-30 RX ADMIN — OSELTAMIVIR PHOSPHATE 30 MG: 30 CAPSULE ORAL at 10:24

## 2018-03-30 RX ADMIN — IPRATROPIUM BROMIDE AND ALBUTEROL SULFATE 1 AMPULE: .5; 3 SOLUTION RESPIRATORY (INHALATION) at 07:51

## 2018-03-30 RX ADMIN — PREDNISONE 40 MG: 20 TABLET ORAL at 10:24

## 2018-03-30 RX ADMIN — CELECOXIB 200 MG: 200 CAPSULE ORAL at 20:49

## 2018-03-30 RX ADMIN — IPRATROPIUM BROMIDE AND ALBUTEROL SULFATE 1 AMPULE: .5; 3 SOLUTION RESPIRATORY (INHALATION) at 16:31

## 2018-03-30 RX ADMIN — HYDRALAZINE HYDROCHLORIDE 50 MG: 50 TABLET ORAL at 16:00

## 2018-03-30 RX ADMIN — OXYBUTYNIN CHLORIDE 5 MG: 5 TABLET ORAL at 20:48

## 2018-03-30 RX ADMIN — LEVOTHYROXINE SODIUM 200 MCG: 100 TABLET ORAL at 10:24

## 2018-03-30 RX ADMIN — HYDROCODONE BITARTRATE AND ACETAMINOPHEN 1 TABLET: 5; 325 TABLET ORAL at 20:54

## 2018-03-30 RX ADMIN — AZITHROMYCIN MONOHYDRATE 500 MG: 500 INJECTION, POWDER, LYOPHILIZED, FOR SOLUTION INTRAVENOUS at 05:24

## 2018-03-30 RX ADMIN — METOPROLOL TARTRATE 75 MG: 25 TABLET ORAL at 10:25

## 2018-03-30 RX ADMIN — IPRATROPIUM BROMIDE AND ALBUTEROL SULFATE 1 AMPULE: .5; 3 SOLUTION RESPIRATORY (INHALATION) at 21:40

## 2018-03-30 RX ADMIN — ENOXAPARIN SODIUM 40 MG: 40 INJECTION SUBCUTANEOUS at 10:26

## 2018-03-30 RX ADMIN — DOCUSATE SODIUM 100 MG: 100 CAPSULE ORAL at 10:24

## 2018-03-30 RX ADMIN — DOCUSATE SODIUM 100 MG: 100 CAPSULE ORAL at 20:48

## 2018-03-30 RX ADMIN — HYDRALAZINE HYDROCHLORIDE 50 MG: 50 TABLET ORAL at 20:48

## 2018-03-30 RX ADMIN — BUMETANIDE 2 MG: 1 TABLET ORAL at 10:25

## 2018-03-30 RX ADMIN — FOLIC ACID 1 MG: 1 TABLET ORAL at 10:24

## 2018-03-30 RX ADMIN — CITALOPRAM 20 MG: 20 TABLET, FILM COATED ORAL at 10:25

## 2018-03-30 RX ADMIN — OXYBUTYNIN CHLORIDE 5 MG: 5 TABLET ORAL at 16:00

## 2018-03-30 RX ADMIN — CELECOXIB 200 MG: 200 CAPSULE ORAL at 10:25

## 2018-03-30 RX ADMIN — PRAVASTATIN SODIUM 40 MG: 40 TABLET ORAL at 20:48

## 2018-03-30 RX ADMIN — CEFTRIAXONE 1 G: 1 INJECTION, POWDER, FOR SOLUTION INTRAMUSCULAR; INTRAVENOUS at 03:53

## 2018-03-30 RX ADMIN — MICONAZOLE NITRATE: 2 POWDER TOPICAL at 10:27

## 2018-03-30 RX ADMIN — ALLOPURINOL 100 MG: 100 TABLET ORAL at 10:24

## 2018-03-30 RX ADMIN — AMLODIPINE BESYLATE 10 MG: 10 TABLET ORAL at 10:25

## 2018-03-30 RX ADMIN — METOPROLOL TARTRATE 75 MG: 25 TABLET ORAL at 20:48

## 2018-03-30 RX ADMIN — MICONAZOLE NITRATE: 2 POWDER TOPICAL at 20:49

## 2018-03-30 RX ADMIN — LISINOPRIL 20 MG: 20 TABLET ORAL at 20:48

## 2018-03-30 RX ADMIN — OSELTAMIVIR PHOSPHATE 30 MG: 30 CAPSULE ORAL at 20:48

## 2018-03-30 RX ADMIN — LISINOPRIL 20 MG: 20 TABLET ORAL at 10:25

## 2018-03-30 RX ADMIN — OXYBUTYNIN CHLORIDE 5 MG: 5 TABLET ORAL at 10:25

## 2018-03-30 RX ADMIN — HYDRALAZINE HYDROCHLORIDE 50 MG: 50 TABLET ORAL at 11:00

## 2018-03-30 ASSESSMENT — PAIN SCALES - GENERAL
PAINLEVEL_OUTOF10: 0
PAINLEVEL_OUTOF10: 0
PAINLEVEL_OUTOF10: 4
PAINLEVEL_OUTOF10: 0

## 2018-03-31 PROCEDURE — 6360000002 HC RX W HCPCS: Performed by: INTERNAL MEDICINE

## 2018-03-31 PROCEDURE — 6370000000 HC RX 637 (ALT 250 FOR IP): Performed by: INTERNAL MEDICINE

## 2018-03-31 PROCEDURE — 94669 MECHANICAL CHEST WALL OSCILL: CPT

## 2018-03-31 PROCEDURE — 6370000000 HC RX 637 (ALT 250 FOR IP): Performed by: PODIATRIST

## 2018-03-31 PROCEDURE — 94640 AIRWAY INHALATION TREATMENT: CPT

## 2018-03-31 PROCEDURE — 99232 SBSQ HOSP IP/OBS MODERATE 35: CPT | Performed by: INTERNAL MEDICINE

## 2018-03-31 PROCEDURE — 94760 N-INVAS EAR/PLS OXIMETRY 1: CPT

## 2018-03-31 PROCEDURE — 2700000000 HC OXYGEN THERAPY PER DAY

## 2018-03-31 PROCEDURE — 1200000000 HC SEMI PRIVATE

## 2018-03-31 RX ORDER — GUAIFENESIN 600 MG/1
600 TABLET, EXTENDED RELEASE ORAL 2 TIMES DAILY
Status: DISCONTINUED | OUTPATIENT
Start: 2018-03-31 | End: 2018-04-02 | Stop reason: HOSPADM

## 2018-03-31 RX ADMIN — HYDRALAZINE HYDROCHLORIDE 50 MG: 50 TABLET ORAL at 16:02

## 2018-03-31 RX ADMIN — PANTOPRAZOLE SODIUM 40 MG: 40 TABLET, DELAYED RELEASE ORAL at 09:01

## 2018-03-31 RX ADMIN — ALLOPURINOL 100 MG: 100 TABLET ORAL at 09:01

## 2018-03-31 RX ADMIN — MICONAZOLE NITRATE: 2 POWDER TOPICAL at 22:31

## 2018-03-31 RX ADMIN — ENOXAPARIN SODIUM 40 MG: 40 INJECTION SUBCUTANEOUS at 09:02

## 2018-03-31 RX ADMIN — OSELTAMIVIR PHOSPHATE 30 MG: 30 CAPSULE ORAL at 21:52

## 2018-03-31 RX ADMIN — LISINOPRIL 20 MG: 20 TABLET ORAL at 21:51

## 2018-03-31 RX ADMIN — LISINOPRIL 20 MG: 20 TABLET ORAL at 09:01

## 2018-03-31 RX ADMIN — FOLIC ACID 1 MG: 1 TABLET ORAL at 09:01

## 2018-03-31 RX ADMIN — CELECOXIB 200 MG: 200 CAPSULE ORAL at 21:52

## 2018-03-31 RX ADMIN — HYDRALAZINE HYDROCHLORIDE 50 MG: 50 TABLET ORAL at 21:51

## 2018-03-31 RX ADMIN — LEVOTHYROXINE SODIUM 200 MCG: 100 TABLET ORAL at 05:47

## 2018-03-31 RX ADMIN — GUAIFENESIN 600 MG: 600 TABLET, EXTENDED RELEASE ORAL at 21:51

## 2018-03-31 RX ADMIN — OXYBUTYNIN CHLORIDE 5 MG: 5 TABLET ORAL at 21:51

## 2018-03-31 RX ADMIN — HYDRALAZINE HYDROCHLORIDE 50 MG: 50 TABLET ORAL at 05:47

## 2018-03-31 RX ADMIN — MICONAZOLE NITRATE: 2 POWDER TOPICAL at 09:02

## 2018-03-31 RX ADMIN — PRAVASTATIN SODIUM 40 MG: 40 TABLET ORAL at 22:30

## 2018-03-31 RX ADMIN — BUMETANIDE 2 MG: 1 TABLET ORAL at 09:01

## 2018-03-31 RX ADMIN — GUAIFENESIN 600 MG: 600 TABLET, EXTENDED RELEASE ORAL at 16:02

## 2018-03-31 RX ADMIN — PREDNISONE 40 MG: 20 TABLET ORAL at 09:01

## 2018-03-31 RX ADMIN — HYDROCODONE BITARTRATE AND ACETAMINOPHEN 1 TABLET: 5; 325 TABLET ORAL at 21:52

## 2018-03-31 RX ADMIN — MUPIROCIN: 20 OINTMENT TOPICAL at 09:03

## 2018-03-31 RX ADMIN — OSELTAMIVIR PHOSPHATE 30 MG: 30 CAPSULE ORAL at 09:01

## 2018-03-31 RX ADMIN — IPRATROPIUM BROMIDE AND ALBUTEROL SULFATE 1 AMPULE: .5; 3 SOLUTION RESPIRATORY (INHALATION) at 08:17

## 2018-03-31 RX ADMIN — CELECOXIB 200 MG: 200 CAPSULE ORAL at 09:02

## 2018-03-31 RX ADMIN — IPRATROPIUM BROMIDE AND ALBUTEROL SULFATE 1 AMPULE: .5; 3 SOLUTION RESPIRATORY (INHALATION) at 16:06

## 2018-03-31 RX ADMIN — OXYBUTYNIN CHLORIDE 5 MG: 5 TABLET ORAL at 09:01

## 2018-03-31 RX ADMIN — OXYBUTYNIN CHLORIDE 5 MG: 5 TABLET ORAL at 15:00

## 2018-03-31 RX ADMIN — METOPROLOL TARTRATE 75 MG: 25 TABLET ORAL at 22:30

## 2018-03-31 RX ADMIN — AMLODIPINE BESYLATE 10 MG: 10 TABLET ORAL at 09:01

## 2018-03-31 RX ADMIN — METOPROLOL TARTRATE 75 MG: 25 TABLET ORAL at 09:01

## 2018-03-31 RX ADMIN — IPRATROPIUM BROMIDE AND ALBUTEROL SULFATE 1 AMPULE: .5; 3 SOLUTION RESPIRATORY (INHALATION) at 13:08

## 2018-03-31 RX ADMIN — DOCUSATE SODIUM 100 MG: 100 CAPSULE ORAL at 21:51

## 2018-03-31 RX ADMIN — DOCUSATE SODIUM 100 MG: 100 CAPSULE ORAL at 09:01

## 2018-03-31 RX ADMIN — POTASSIUM CHLORIDE 20 MEQ: 1500 TABLET, EXTENDED RELEASE ORAL at 09:01

## 2018-03-31 RX ADMIN — CITALOPRAM 20 MG: 20 TABLET, FILM COATED ORAL at 09:01

## 2018-03-31 RX ADMIN — IPRATROPIUM BROMIDE AND ALBUTEROL SULFATE 1 AMPULE: .5; 3 SOLUTION RESPIRATORY (INHALATION) at 19:44

## 2018-03-31 RX ADMIN — Medication 5 ML: at 22:30

## 2018-03-31 ASSESSMENT — PAIN SCALES - GENERAL
PAINLEVEL_OUTOF10: 4
PAINLEVEL_OUTOF10: 4
PAINLEVEL_OUTOF10: 2

## 2018-03-31 ASSESSMENT — PAIN DESCRIPTION - LOCATION: LOCATION: TOE (COMMENT WHICH ONE)

## 2018-03-31 ASSESSMENT — PAIN DESCRIPTION - PAIN TYPE: TYPE: ACUTE PAIN

## 2018-04-01 PROCEDURE — 94640 AIRWAY INHALATION TREATMENT: CPT

## 2018-04-01 PROCEDURE — 6370000000 HC RX 637 (ALT 250 FOR IP): Performed by: INTERNAL MEDICINE

## 2018-04-01 PROCEDURE — 6370000000 HC RX 637 (ALT 250 FOR IP): Performed by: PODIATRIST

## 2018-04-01 PROCEDURE — 1200000000 HC SEMI PRIVATE

## 2018-04-01 PROCEDURE — 99232 SBSQ HOSP IP/OBS MODERATE 35: CPT | Performed by: INTERNAL MEDICINE

## 2018-04-01 RX ADMIN — IPRATROPIUM BROMIDE AND ALBUTEROL SULFATE 1 AMPULE: .5; 3 SOLUTION RESPIRATORY (INHALATION) at 09:38

## 2018-04-01 RX ADMIN — IPRATROPIUM BROMIDE AND ALBUTEROL SULFATE 1 AMPULE: .5; 3 SOLUTION RESPIRATORY (INHALATION) at 17:35

## 2018-04-01 RX ADMIN — LEVOTHYROXINE SODIUM 200 MCG: 100 TABLET ORAL at 09:11

## 2018-04-01 RX ADMIN — MICONAZOLE NITRATE: 2 POWDER TOPICAL at 09:14

## 2018-04-01 RX ADMIN — OXYBUTYNIN CHLORIDE 5 MG: 5 TABLET ORAL at 09:12

## 2018-04-01 RX ADMIN — PREDNISONE 40 MG: 20 TABLET ORAL at 09:11

## 2018-04-01 RX ADMIN — ALLOPURINOL 100 MG: 100 TABLET ORAL at 09:12

## 2018-04-01 RX ADMIN — CELECOXIB 200 MG: 200 CAPSULE ORAL at 09:11

## 2018-04-01 RX ADMIN — POTASSIUM CHLORIDE 20 MEQ: 1500 TABLET, EXTENDED RELEASE ORAL at 09:12

## 2018-04-01 RX ADMIN — HYDRALAZINE HYDROCHLORIDE 50 MG: 50 TABLET ORAL at 21:08

## 2018-04-01 RX ADMIN — CITALOPRAM 20 MG: 20 TABLET, FILM COATED ORAL at 09:11

## 2018-04-01 RX ADMIN — AMLODIPINE BESYLATE 10 MG: 10 TABLET ORAL at 09:11

## 2018-04-01 RX ADMIN — OXYBUTYNIN CHLORIDE 5 MG: 5 TABLET ORAL at 15:59

## 2018-04-01 RX ADMIN — PRAVASTATIN SODIUM 40 MG: 40 TABLET ORAL at 21:08

## 2018-04-01 RX ADMIN — OSELTAMIVIR PHOSPHATE 30 MG: 30 CAPSULE ORAL at 09:11

## 2018-04-01 RX ADMIN — HYDROCODONE BITARTRATE AND ACETAMINOPHEN 1 TABLET: 5; 325 TABLET ORAL at 21:08

## 2018-04-01 RX ADMIN — HYDRALAZINE HYDROCHLORIDE 50 MG: 50 TABLET ORAL at 15:59

## 2018-04-01 RX ADMIN — CELECOXIB 200 MG: 200 CAPSULE ORAL at 21:08

## 2018-04-01 RX ADMIN — IPRATROPIUM BROMIDE AND ALBUTEROL SULFATE 1 AMPULE: .5; 3 SOLUTION RESPIRATORY (INHALATION) at 13:56

## 2018-04-01 RX ADMIN — GUAIFENESIN 600 MG: 600 TABLET, EXTENDED RELEASE ORAL at 21:08

## 2018-04-01 RX ADMIN — FOLIC ACID 1 MG: 1 TABLET ORAL at 09:11

## 2018-04-01 RX ADMIN — OXYBUTYNIN CHLORIDE 5 MG: 5 TABLET ORAL at 21:08

## 2018-04-01 RX ADMIN — DOCUSATE SODIUM 100 MG: 100 CAPSULE ORAL at 21:08

## 2018-04-01 RX ADMIN — METOPROLOL TARTRATE 75 MG: 25 TABLET ORAL at 09:11

## 2018-04-01 RX ADMIN — GUAIFENESIN 600 MG: 600 TABLET, EXTENDED RELEASE ORAL at 09:12

## 2018-04-01 RX ADMIN — PANTOPRAZOLE SODIUM 40 MG: 40 TABLET, DELAYED RELEASE ORAL at 09:11

## 2018-04-01 RX ADMIN — BUMETANIDE 2 MG: 1 TABLET ORAL at 09:11

## 2018-04-01 RX ADMIN — METOPROLOL TARTRATE 75 MG: 25 TABLET ORAL at 21:08

## 2018-04-01 RX ADMIN — HYDRALAZINE HYDROCHLORIDE 50 MG: 50 TABLET ORAL at 09:11

## 2018-04-01 RX ADMIN — LISINOPRIL 20 MG: 20 TABLET ORAL at 09:11

## 2018-04-01 RX ADMIN — LISINOPRIL 20 MG: 20 TABLET ORAL at 21:08

## 2018-04-01 RX ADMIN — MICONAZOLE NITRATE: 2 POWDER TOPICAL at 21:23

## 2018-04-01 RX ADMIN — MUPIROCIN: 20 OINTMENT TOPICAL at 09:14

## 2018-04-01 ASSESSMENT — PAIN SCALES - GENERAL
PAINLEVEL_OUTOF10: 0
PAINLEVEL_OUTOF10: 0
PAINLEVEL_OUTOF10: 6
PAINLEVEL_OUTOF10: 4
PAINLEVEL_OUTOF10: 0
PAINLEVEL_OUTOF10: 4

## 2018-04-01 ASSESSMENT — PAIN DESCRIPTION - DESCRIPTORS: DESCRIPTORS: DULL;DISCOMFORT;ACHING

## 2018-04-01 ASSESSMENT — PAIN DESCRIPTION - ORIENTATION: ORIENTATION: RIGHT

## 2018-04-01 ASSESSMENT — PAIN DESCRIPTION - LOCATION: LOCATION: RIB CAGE

## 2018-04-02 VITALS
DIASTOLIC BLOOD PRESSURE: 68 MMHG | HEIGHT: 66 IN | BODY MASS INDEX: 37.41 KG/M2 | HEART RATE: 77 BPM | OXYGEN SATURATION: 93 % | SYSTOLIC BLOOD PRESSURE: 120 MMHG | TEMPERATURE: 98.5 F | WEIGHT: 232.8 LBS | RESPIRATION RATE: 20 BRPM

## 2018-04-02 LAB
ANION GAP SERPL CALCULATED.3IONS-SCNC: 10 MEQ/L (ref 8–16)
BLOOD CULTURE, ROUTINE: NORMAL
BLOOD CULTURE, ROUTINE: NORMAL
BUN BLDV-MCNC: 40 MG/DL (ref 7–22)
CALCIUM SERPL-MCNC: 9.1 MG/DL (ref 8.5–10.5)
CHLORIDE BLD-SCNC: 97 MEQ/L (ref 98–111)
CO2: 35 MEQ/L (ref 23–33)
CREAT SERPL-MCNC: 1.4 MG/DL (ref 0.4–1.2)
GFR SERPL CREATININE-BSD FRML MDRD: 36 ML/MIN/1.73M2
GLUCOSE BLD-MCNC: 108 MG/DL (ref 70–108)
POTASSIUM SERPL-SCNC: 3.8 MEQ/L (ref 3.5–5.2)
SODIUM BLD-SCNC: 142 MEQ/L (ref 135–145)

## 2018-04-02 PROCEDURE — 6370000000 HC RX 637 (ALT 250 FOR IP): Performed by: INTERNAL MEDICINE

## 2018-04-02 PROCEDURE — 94640 AIRWAY INHALATION TREATMENT: CPT

## 2018-04-02 PROCEDURE — 80048 BASIC METABOLIC PNL TOTAL CA: CPT

## 2018-04-02 PROCEDURE — 6360000002 HC RX W HCPCS: Performed by: INTERNAL MEDICINE

## 2018-04-02 PROCEDURE — 99238 HOSP IP/OBS DSCHRG MGMT 30/<: CPT | Performed by: INTERNAL MEDICINE

## 2018-04-02 PROCEDURE — 94669 MECHANICAL CHEST WALL OSCILL: CPT

## 2018-04-02 PROCEDURE — 2700000000 HC OXYGEN THERAPY PER DAY

## 2018-04-02 PROCEDURE — 36415 COLL VENOUS BLD VENIPUNCTURE: CPT

## 2018-04-02 RX ORDER — GUAIFENESIN 600 MG/1
600 TABLET, EXTENDED RELEASE ORAL 2 TIMES DAILY
DISCHARGE
Start: 2018-04-02 | End: 2018-04-07

## 2018-04-02 RX ORDER — BUMETANIDE 1 MG/1
1 TABLET ORAL DAILY
Status: ON HOLD | DISCHARGE
Start: 2018-04-02 | End: 2018-11-18 | Stop reason: HOSPADM

## 2018-04-02 RX ORDER — HYDROCODONE BITARTRATE AND ACETAMINOPHEN 5; 325 MG/1; MG/1
1 TABLET ORAL EVERY 6 HOURS PRN
Qty: 12 TABLET | Refills: 0 | Status: SHIPPED | OUTPATIENT
Start: 2018-04-02 | End: 2018-05-02

## 2018-04-02 RX ORDER — BUMETANIDE 1 MG/1
1 TABLET ORAL DAILY
Status: DISCONTINUED | OUTPATIENT
Start: 2018-04-02 | End: 2018-04-02 | Stop reason: HOSPADM

## 2018-04-02 RX ORDER — IPRATROPIUM BROMIDE AND ALBUTEROL SULFATE 2.5; .5 MG/3ML; MG/3ML
3 SOLUTION RESPIRATORY (INHALATION)
Qty: 360 ML | DISCHARGE
Start: 2018-04-02 | End: 2018-04-07

## 2018-04-02 RX ORDER — PREDNISONE 10 MG/1
TABLET ORAL
Qty: 30 TABLET | Refills: 0 | Status: ON HOLD | DISCHARGE
Start: 2018-04-02 | End: 2018-11-14 | Stop reason: ALTCHOICE

## 2018-04-02 RX ADMIN — BUMETANIDE 1 MG: 1 TABLET ORAL at 08:21

## 2018-04-02 RX ADMIN — CITALOPRAM 20 MG: 20 TABLET, FILM COATED ORAL at 08:21

## 2018-04-02 RX ADMIN — PANTOPRAZOLE SODIUM 40 MG: 40 TABLET, DELAYED RELEASE ORAL at 08:21

## 2018-04-02 RX ADMIN — LISINOPRIL 20 MG: 20 TABLET ORAL at 08:21

## 2018-04-02 RX ADMIN — FOLIC ACID 1 MG: 1 TABLET ORAL at 08:21

## 2018-04-02 RX ADMIN — CELECOXIB 200 MG: 200 CAPSULE ORAL at 08:21

## 2018-04-02 RX ADMIN — MICONAZOLE NITRATE: 2 POWDER TOPICAL at 08:24

## 2018-04-02 RX ADMIN — POTASSIUM CHLORIDE 20 MEQ: 1500 TABLET, EXTENDED RELEASE ORAL at 08:21

## 2018-04-02 RX ADMIN — AMLODIPINE BESYLATE 10 MG: 10 TABLET ORAL at 08:21

## 2018-04-02 RX ADMIN — METOPROLOL TARTRATE 75 MG: 25 TABLET ORAL at 08:21

## 2018-04-02 RX ADMIN — PREDNISONE 40 MG: 20 TABLET ORAL at 08:21

## 2018-04-02 RX ADMIN — LEVOTHYROXINE SODIUM 200 MCG: 100 TABLET ORAL at 05:36

## 2018-04-02 RX ADMIN — GUAIFENESIN 600 MG: 600 TABLET, EXTENDED RELEASE ORAL at 08:21

## 2018-04-02 RX ADMIN — HYDRALAZINE HYDROCHLORIDE 50 MG: 50 TABLET ORAL at 05:36

## 2018-04-02 RX ADMIN — ENOXAPARIN SODIUM 40 MG: 40 INJECTION SUBCUTANEOUS at 08:21

## 2018-04-02 RX ADMIN — IPRATROPIUM BROMIDE AND ALBUTEROL SULFATE 1 AMPULE: .5; 3 SOLUTION RESPIRATORY (INHALATION) at 13:28

## 2018-04-02 RX ADMIN — IPRATROPIUM BROMIDE AND ALBUTEROL SULFATE 1 AMPULE: .5; 3 SOLUTION RESPIRATORY (INHALATION) at 10:09

## 2018-04-02 RX ADMIN — DOCUSATE SODIUM 100 MG: 100 CAPSULE ORAL at 08:21

## 2018-04-02 RX ADMIN — OXYBUTYNIN CHLORIDE 5 MG: 5 TABLET ORAL at 08:21

## 2018-04-02 RX ADMIN — ALLOPURINOL 100 MG: 100 TABLET ORAL at 08:21

## 2018-04-02 ASSESSMENT — PAIN SCALES - GENERAL
PAINLEVEL_OUTOF10: 0
PAINLEVEL_OUTOF10: 0

## 2018-04-30 ENCOUNTER — TELEPHONE (OUTPATIENT)
Dept: PULMONOLOGY | Age: 78
End: 2018-04-30

## 2018-04-30 DIAGNOSIS — J44.9 CHRONIC OBSTRUCTIVE PULMONARY DISEASE, UNSPECIFIED COPD TYPE (HCC): Primary | ICD-10-CM

## 2018-05-01 ENCOUNTER — HOSPITAL ENCOUNTER (OUTPATIENT)
Dept: PULMONOLOGY | Age: 78
Discharge: HOME | End: 2018-05-01
Payer: MEDICARE

## 2018-05-01 DIAGNOSIS — J44.9 CHRONIC OBSTRUCTIVE PULMONARY DISEASE, UNSPECIFIED COPD TYPE (HCC): ICD-10-CM

## 2018-11-14 ENCOUNTER — APPOINTMENT (OUTPATIENT)
Dept: CT IMAGING | Age: 78
DRG: 308 | End: 2018-11-14
Payer: MEDICARE

## 2018-11-14 ENCOUNTER — APPOINTMENT (OUTPATIENT)
Dept: INTERVENTIONAL RADIOLOGY/VASCULAR | Age: 78
DRG: 308 | End: 2018-11-14
Payer: MEDICARE

## 2018-11-14 ENCOUNTER — HOSPITAL ENCOUNTER (INPATIENT)
Age: 78
LOS: 4 days | Discharge: NURSING, REHAB OR LONG-TERM CARE | DRG: 308 | End: 2018-11-18
Attending: INTERNAL MEDICINE | Admitting: INTERNAL MEDICINE
Payer: MEDICARE

## 2018-11-14 ENCOUNTER — APPOINTMENT (OUTPATIENT)
Dept: GENERAL RADIOLOGY | Age: 78
DRG: 308 | End: 2018-11-14
Payer: MEDICARE

## 2018-11-14 DIAGNOSIS — I50.9 CONGESTIVE HEART FAILURE, UNSPECIFIED HF CHRONICITY, UNSPECIFIED HEART FAILURE TYPE (HCC): Primary | ICD-10-CM

## 2018-11-14 DIAGNOSIS — I48.91 NEW ONSET ATRIAL FIBRILLATION (HCC): ICD-10-CM

## 2018-11-14 LAB
ALBUMIN SERPL-MCNC: 4.1 G/DL (ref 3.5–5.1)
ALLEN TEST: POSITIVE
ALP BLD-CCNC: 86 U/L (ref 38–126)
ALT SERPL-CCNC: 32 U/L (ref 11–66)
AMYLASE: 44 U/L (ref 20–104)
ANION GAP SERPL CALCULATED.3IONS-SCNC: 15 MEQ/L (ref 8–16)
APTT: 34.3 SECONDS (ref 22–38)
APTT: > 200 SECONDS (ref 22–38)
AST SERPL-CCNC: 29 U/L (ref 5–40)
BASE EXCESS (CALCULATED): -1.9 MMOL/L (ref -2.5–2.5)
BASE EXCESS (CALCULATED): -3.3 MMOL/L (ref -2.5–2.5)
BASOPHILS # BLD: 0.5 %
BASOPHILS ABSOLUTE: 0.1 THOU/MM3 (ref 0–0.1)
BILIRUB SERPL-MCNC: 0.8 MG/DL (ref 0.3–1.2)
BILIRUBIN DIRECT: < 0.2 MG/DL (ref 0–0.3)
BILIRUBIN URINE: NEGATIVE
BLOOD, URINE: NEGATIVE
BUN BLDV-MCNC: 33 MG/DL (ref 7–22)
CALCIUM SERPL-MCNC: 9.4 MG/DL (ref 8.5–10.5)
CHARACTER, URINE: CLEAR
CHLORIDE BLD-SCNC: 107 MEQ/L (ref 98–111)
CO2: 20 MEQ/L (ref 23–33)
COLLECTED BY:: ABNORMAL
COLLECTED BY:: ABNORMAL
COLOR: YELLOW
CREAT SERPL-MCNC: 0.9 MG/DL (ref 0.4–1.2)
D-DIMER QUANTITATIVE: 2089 NG/ML FEU (ref 0–500)
DEVICE: ABNORMAL
DEVICE: ABNORMAL
EKG ATRIAL RATE: 58 BPM
EKG Q-T INTERVAL: 360 MS
EKG QRS DURATION: 74 MS
EKG QTC CALCULATION (BAZETT): 402 MS
EKG R AXIS: 101 DEGREES
EKG T AXIS: 36 DEGREES
EKG VENTRICULAR RATE: 75 BPM
EOSINOPHIL # BLD: 0.2 %
EOSINOPHILS ABSOLUTE: 0 THOU/MM3 (ref 0–0.4)
ERYTHROCYTE [DISTWIDTH] IN BLOOD BY AUTOMATED COUNT: 15.3 % (ref 11.5–14.5)
ERYTHROCYTE [DISTWIDTH] IN BLOOD BY AUTOMATED COUNT: 54.5 FL (ref 35–45)
FLU A ANTIGEN: NEGATIVE
FLU B ANTIGEN: NEGATIVE
GFR SERPL CREATININE-BSD FRML MDRD: 60 ML/MIN/1.73M2
GLUCOSE BLD-MCNC: 135 MG/DL (ref 70–108)
GLUCOSE URINE: NEGATIVE MG/DL
HCO3: 24 MMOL/L (ref 23–28)
HCO3: 24 MMOL/L (ref 23–28)
HCT VFR BLD CALC: 42.8 % (ref 37–47)
HEMOGLOBIN: 13.9 GM/DL (ref 12–16)
IFIO2: 70
IMMATURE GRANS (ABS): 0.41 THOU/MM3 (ref 0–0.07)
IMMATURE GRANULOCYTES: 2.4 %
INR BLD: 1.15 (ref 0.85–1.13)
KETONES, URINE: NEGATIVE
LEUKOCYTE ESTERASE, URINE: NEGATIVE
LIPASE: 14.4 U/L (ref 5.6–51.3)
LYMPHOCYTES # BLD: 10.6 %
LYMPHOCYTES ABSOLUTE: 1.8 THOU/MM3 (ref 1–4.8)
MCH RBC QN AUTO: 31.8 PG (ref 26–33)
MCHC RBC AUTO-ENTMCNC: 32.5 GM/DL (ref 32.2–35.5)
MCV RBC AUTO: 97.9 FL (ref 81–99)
MONOCYTES # BLD: 5.4 %
MONOCYTES ABSOLUTE: 0.9 THOU/MM3 (ref 0.4–1.3)
NITRITE, URINE: NEGATIVE
NUCLEATED RED BLOOD CELLS: 0 /100 WBC
O2 SATURATION: 96 %
O2 SATURATION: 99 %
OSMOLALITY CALCULATION: 292.4 MOSMOL/KG (ref 275–300)
PCO2: 44 MMHG (ref 35–45)
PCO2: 48 MMHG (ref 35–45)
PH BLOOD GAS: 7.3 (ref 7.35–7.45)
PH BLOOD GAS: 7.34 (ref 7.35–7.45)
PH UA: 5
PLATELET # BLD: 198 THOU/MM3 (ref 130–400)
PMV BLD AUTO: 10.3 FL (ref 9.4–12.4)
PO2: 162 MMHG (ref 71–104)
PO2: 94 MMHG (ref 71–104)
POTASSIUM SERPL-SCNC: 4.8 MEQ/L (ref 3.5–5.2)
PRO-BNP: 3495 PG/ML (ref 0–1800)
PROTEIN UA: NEGATIVE
RBC # BLD: 4.37 MILL/MM3 (ref 4.2–5.4)
SEG NEUTROPHILS: 80.9 %
SEGMENTED NEUTROPHILS ABSOLUTE COUNT: 13.7 THOU/MM3 (ref 1.8–7.7)
SODIUM BLD-SCNC: 142 MEQ/L (ref 135–145)
SOURCE, BLOOD GAS: ABNORMAL
SOURCE, BLOOD GAS: ABNORMAL
SPECIFIC GRAVITY, URINE: 1.02 (ref 1–1.03)
T4 FREE: 2.31 NG/DL (ref 0.93–1.76)
TOTAL PROTEIN: 7.3 G/DL (ref 6.1–8)
TROPONIN T: < 0.01 NG/ML
TSH SERPL DL<=0.05 MIU/L-ACNC: 0.41 UIU/ML (ref 0.4–4.2)
UROBILINOGEN, URINE: 0.2 EU/DL
WBC # BLD: 16.9 THOU/MM3 (ref 4.8–10.8)

## 2018-11-14 PROCEDURE — 71275 CT ANGIOGRAPHY CHEST: CPT

## 2018-11-14 PROCEDURE — 93970 EXTREMITY STUDY: CPT

## 2018-11-14 PROCEDURE — 2709999900 HC NON-CHARGEABLE SUPPLY

## 2018-11-14 PROCEDURE — 2500000003 HC RX 250 WO HCPCS: Performed by: INTERNAL MEDICINE

## 2018-11-14 PROCEDURE — 2580000003 HC RX 258: Performed by: INTERNAL MEDICINE

## 2018-11-14 PROCEDURE — 85730 THROMBOPLASTIN TIME PARTIAL: CPT

## 2018-11-14 PROCEDURE — 83880 ASSAY OF NATRIURETIC PEPTIDE: CPT

## 2018-11-14 PROCEDURE — 83690 ASSAY OF LIPASE: CPT

## 2018-11-14 PROCEDURE — 36600 WITHDRAWAL OF ARTERIAL BLOOD: CPT

## 2018-11-14 PROCEDURE — 6360000002 HC RX W HCPCS: Performed by: INTERNAL MEDICINE

## 2018-11-14 PROCEDURE — 93010 ELECTROCARDIOGRAM REPORT: CPT | Performed by: INTERNAL MEDICINE

## 2018-11-14 PROCEDURE — 82803 BLOOD GASES ANY COMBINATION: CPT

## 2018-11-14 PROCEDURE — 87040 BLOOD CULTURE FOR BACTERIA: CPT

## 2018-11-14 PROCEDURE — 2700000000 HC OXYGEN THERAPY PER DAY

## 2018-11-14 PROCEDURE — 96374 THER/PROPH/DIAG INJ IV PUSH: CPT

## 2018-11-14 PROCEDURE — 85610 PROTHROMBIN TIME: CPT

## 2018-11-14 PROCEDURE — 82248 BILIRUBIN DIRECT: CPT

## 2018-11-14 PROCEDURE — 93005 ELECTROCARDIOGRAM TRACING: CPT | Performed by: EMERGENCY MEDICINE

## 2018-11-14 PROCEDURE — 87804 INFLUENZA ASSAY W/OPTIC: CPT

## 2018-11-14 PROCEDURE — 96375 TX/PRO/DX INJ NEW DRUG ADDON: CPT

## 2018-11-14 PROCEDURE — 6360000004 HC RX CONTRAST MEDICATION: Performed by: INTERNAL MEDICINE

## 2018-11-14 PROCEDURE — 36415 COLL VENOUS BLD VENIPUNCTURE: CPT

## 2018-11-14 PROCEDURE — 85025 COMPLETE CBC W/AUTO DIFF WBC: CPT

## 2018-11-14 PROCEDURE — 85379 FIBRIN DEGRADATION QUANT: CPT

## 2018-11-14 PROCEDURE — 84484 ASSAY OF TROPONIN QUANT: CPT

## 2018-11-14 PROCEDURE — 94640 AIRWAY INHALATION TREATMENT: CPT

## 2018-11-14 PROCEDURE — 84439 ASSAY OF FREE THYROXINE: CPT

## 2018-11-14 PROCEDURE — 6370000000 HC RX 637 (ALT 250 FOR IP): Performed by: INTERNAL MEDICINE

## 2018-11-14 PROCEDURE — 81003 URINALYSIS AUTO W/O SCOPE: CPT

## 2018-11-14 PROCEDURE — 99285 EMERGENCY DEPT VISIT HI MDM: CPT

## 2018-11-14 PROCEDURE — 82150 ASSAY OF AMYLASE: CPT

## 2018-11-14 PROCEDURE — 94660 CPAP INITIATION&MGMT: CPT

## 2018-11-14 PROCEDURE — 80053 COMPREHEN METABOLIC PANEL: CPT

## 2018-11-14 PROCEDURE — 2140000000 HC CCU INTERMEDIATE R&B

## 2018-11-14 PROCEDURE — 84443 ASSAY THYROID STIM HORMONE: CPT

## 2018-11-14 PROCEDURE — 71045 X-RAY EXAM CHEST 1 VIEW: CPT

## 2018-11-14 RX ORDER — LISINOPRIL 20 MG/1
20 TABLET ORAL 2 TIMES DAILY
Status: DISCONTINUED | OUTPATIENT
Start: 2018-11-14 | End: 2018-11-17

## 2018-11-14 RX ORDER — PANTOPRAZOLE SODIUM 40 MG/1
40 TABLET, DELAYED RELEASE ORAL
Status: DISCONTINUED | OUTPATIENT
Start: 2018-11-15 | End: 2018-11-18 | Stop reason: HOSPADM

## 2018-11-14 RX ORDER — HEPARIN SODIUM 1000 [USP'U]/ML
80 INJECTION, SOLUTION INTRAVENOUS; SUBCUTANEOUS PRN
Status: DISCONTINUED | OUTPATIENT
Start: 2018-11-14 | End: 2018-11-16

## 2018-11-14 RX ORDER — HEPARIN SODIUM 10000 [USP'U]/100ML
18 INJECTION, SOLUTION INTRAVENOUS CONTINUOUS
Status: DISCONTINUED | OUTPATIENT
Start: 2018-11-14 | End: 2018-11-16

## 2018-11-14 RX ORDER — BUMETANIDE 1 MG/1
1 TABLET ORAL DAILY
Status: DISCONTINUED | OUTPATIENT
Start: 2018-11-15 | End: 2018-11-16

## 2018-11-14 RX ORDER — ASPIRIN 81 MG/1
81 TABLET ORAL 2 TIMES DAILY
Status: DISCONTINUED | OUTPATIENT
Start: 2018-11-14 | End: 2018-11-18 | Stop reason: HOSPADM

## 2018-11-14 RX ORDER — ATORVASTATIN CALCIUM 40 MG/1
40 TABLET, FILM COATED ORAL NIGHTLY
Status: DISCONTINUED | OUTPATIENT
Start: 2018-11-14 | End: 2018-11-14 | Stop reason: SDUPTHER

## 2018-11-14 RX ORDER — HYDRALAZINE HYDROCHLORIDE 50 MG/1
50 TABLET, FILM COATED ORAL EVERY 8 HOURS SCHEDULED
Status: DISCONTINUED | OUTPATIENT
Start: 2018-11-14 | End: 2018-11-17

## 2018-11-14 RX ORDER — HEPARIN SODIUM 1000 [USP'U]/ML
80 INJECTION, SOLUTION INTRAVENOUS; SUBCUTANEOUS ONCE
Status: COMPLETED | OUTPATIENT
Start: 2018-11-14 | End: 2018-11-14

## 2018-11-14 RX ORDER — ASPIRIN 81 MG/1
81 TABLET, CHEWABLE ORAL DAILY
Status: DISCONTINUED | OUTPATIENT
Start: 2018-11-15 | End: 2018-11-14 | Stop reason: SDUPTHER

## 2018-11-14 RX ORDER — CITALOPRAM 20 MG/1
20 TABLET ORAL DAILY
Status: DISCONTINUED | OUTPATIENT
Start: 2018-11-15 | End: 2018-11-18 | Stop reason: HOSPADM

## 2018-11-14 RX ORDER — HEPARIN SODIUM 1000 [USP'U]/ML
40 INJECTION, SOLUTION INTRAVENOUS; SUBCUTANEOUS PRN
Status: DISCONTINUED | OUTPATIENT
Start: 2018-11-14 | End: 2018-11-16

## 2018-11-14 RX ORDER — POTASSIUM CHLORIDE 20MEQ/15ML
40 LIQUID (ML) ORAL PRN
Status: DISCONTINUED | OUTPATIENT
Start: 2018-11-14 | End: 2018-11-18 | Stop reason: HOSPADM

## 2018-11-14 RX ORDER — NITROGLYCERIN 20 MG/100ML
5 INJECTION INTRAVENOUS CONTINUOUS
Status: DISCONTINUED | OUTPATIENT
Start: 2018-11-14 | End: 2018-11-17

## 2018-11-14 RX ORDER — CELECOXIB 200 MG/1
200 CAPSULE ORAL DAILY
Status: DISCONTINUED | OUTPATIENT
Start: 2018-11-15 | End: 2018-11-18 | Stop reason: HOSPADM

## 2018-11-14 RX ORDER — IPRATROPIUM BROMIDE AND ALBUTEROL SULFATE 2.5; .5 MG/3ML; MG/3ML
1 SOLUTION RESPIRATORY (INHALATION) ONCE
Status: COMPLETED | OUTPATIENT
Start: 2018-11-14 | End: 2018-11-14

## 2018-11-14 RX ORDER — POTASSIUM CHLORIDE 7.45 MG/ML
10 INJECTION INTRAVENOUS PRN
Status: DISCONTINUED | OUTPATIENT
Start: 2018-11-14 | End: 2018-11-18 | Stop reason: HOSPADM

## 2018-11-14 RX ORDER — FOLIC ACID 1 MG/1
1 TABLET ORAL DAILY
Status: DISCONTINUED | OUTPATIENT
Start: 2018-11-14 | End: 2018-11-18 | Stop reason: HOSPADM

## 2018-11-14 RX ORDER — ACETAMINOPHEN 325 MG/1
650 TABLET ORAL EVERY 4 HOURS PRN
Status: DISCONTINUED | OUTPATIENT
Start: 2018-11-14 | End: 2018-11-18 | Stop reason: HOSPADM

## 2018-11-14 RX ORDER — SODIUM CHLORIDE 0.9 % (FLUSH) 0.9 %
10 SYRINGE (ML) INJECTION PRN
Status: DISCONTINUED | OUTPATIENT
Start: 2018-11-14 | End: 2018-11-18 | Stop reason: HOSPADM

## 2018-11-14 RX ORDER — DOCUSATE SODIUM 100 MG/1
100 CAPSULE, LIQUID FILLED ORAL 2 TIMES DAILY PRN
Status: DISCONTINUED | OUTPATIENT
Start: 2018-11-14 | End: 2018-11-18 | Stop reason: HOSPADM

## 2018-11-14 RX ORDER — FUROSEMIDE 10 MG/ML
40 INJECTION INTRAMUSCULAR; INTRAVENOUS ONCE
Status: COMPLETED | OUTPATIENT
Start: 2018-11-14 | End: 2018-11-14

## 2018-11-14 RX ORDER — LEVOTHYROXINE SODIUM 0.1 MG/1
200 TABLET ORAL DAILY
Status: DISCONTINUED | OUTPATIENT
Start: 2018-11-15 | End: 2018-11-18 | Stop reason: HOSPADM

## 2018-11-14 RX ORDER — SODIUM CHLORIDE 0.9 % (FLUSH) 0.9 %
10 SYRINGE (ML) INJECTION EVERY 12 HOURS SCHEDULED
Status: DISCONTINUED | OUTPATIENT
Start: 2018-11-14 | End: 2018-11-18 | Stop reason: HOSPADM

## 2018-11-14 RX ORDER — OXYBUTYNIN CHLORIDE 5 MG/1
5 TABLET ORAL EVERY OTHER DAY
Status: DISCONTINUED | OUTPATIENT
Start: 2018-11-14 | End: 2018-11-18 | Stop reason: HOSPADM

## 2018-11-14 RX ORDER — PRAVASTATIN SODIUM 40 MG
40 TABLET ORAL NIGHTLY
Status: DISCONTINUED | OUTPATIENT
Start: 2018-11-14 | End: 2018-11-18 | Stop reason: HOSPADM

## 2018-11-14 RX ORDER — POTASSIUM CHLORIDE 20 MEQ/1
20 TABLET, EXTENDED RELEASE ORAL DAILY
Status: DISCONTINUED | OUTPATIENT
Start: 2018-11-15 | End: 2018-11-18 | Stop reason: HOSPADM

## 2018-11-14 RX ORDER — AMLODIPINE BESYLATE 10 MG/1
10 TABLET ORAL DAILY
Status: DISCONTINUED | OUTPATIENT
Start: 2018-11-15 | End: 2018-11-17

## 2018-11-14 RX ORDER — METOPROLOL TARTRATE 100 MG/1
100 TABLET ORAL DAILY
COMMUNITY
End: 2020-02-13

## 2018-11-14 RX ORDER — ALLOPURINOL 100 MG/1
100 TABLET ORAL DAILY
Status: DISCONTINUED | OUTPATIENT
Start: 2018-11-15 | End: 2018-11-18 | Stop reason: HOSPADM

## 2018-11-14 RX ORDER — POTASSIUM CHLORIDE 20 MEQ/1
40 TABLET, EXTENDED RELEASE ORAL PRN
Status: DISCONTINUED | OUTPATIENT
Start: 2018-11-14 | End: 2018-11-18 | Stop reason: HOSPADM

## 2018-11-14 RX ORDER — ONDANSETRON 2 MG/ML
4 INJECTION INTRAMUSCULAR; INTRAVENOUS EVERY 6 HOURS PRN
Status: DISCONTINUED | OUTPATIENT
Start: 2018-11-14 | End: 2018-11-18 | Stop reason: HOSPADM

## 2018-11-14 RX ORDER — MORPHINE SULFATE 2 MG/ML
2 INJECTION, SOLUTION INTRAMUSCULAR; INTRAVENOUS ONCE
Status: COMPLETED | OUTPATIENT
Start: 2018-11-14 | End: 2018-11-14

## 2018-11-14 RX ORDER — METOPROLOL TARTRATE 100 MG/1
100 TABLET ORAL 2 TIMES DAILY
Status: DISCONTINUED | OUTPATIENT
Start: 2018-11-14 | End: 2018-11-18 | Stop reason: HOSPADM

## 2018-11-14 RX ORDER — BUMETANIDE 0.25 MG/ML
2 INJECTION, SOLUTION INTRAMUSCULAR; INTRAVENOUS EVERY 12 HOURS
Status: COMPLETED | OUTPATIENT
Start: 2018-11-14 | End: 2018-11-16

## 2018-11-14 RX ADMIN — ACETAMINOPHEN 650 MG: 325 TABLET ORAL at 23:11

## 2018-11-14 RX ADMIN — METOPROLOL TARTRATE 100 MG: 100 TABLET, FILM COATED ORAL at 23:11

## 2018-11-14 RX ADMIN — NITROGLYCERIN 5 MCG/MIN: 20 INJECTION INTRAVENOUS at 11:33

## 2018-11-14 RX ADMIN — MORPHINE SULFATE 2 MG: 2 INJECTION, SOLUTION INTRAMUSCULAR; INTRAVENOUS at 12:10

## 2018-11-14 RX ADMIN — HEPARIN SODIUM 18 UNITS/KG/HR: 10000 INJECTION, SOLUTION INTRAVENOUS at 13:42

## 2018-11-14 RX ADMIN — LISINOPRIL 20 MG: 20 TABLET ORAL at 23:12

## 2018-11-14 RX ADMIN — PRAVASTATIN SODIUM 40 MG: 40 TABLET ORAL at 23:11

## 2018-11-14 RX ADMIN — FUROSEMIDE 40 MG: 10 INJECTION, SOLUTION INTRAMUSCULAR; INTRAVENOUS at 11:33

## 2018-11-14 RX ADMIN — IPRATROPIUM BROMIDE AND ALBUTEROL SULFATE 1 AMPULE: .5; 3 SOLUTION RESPIRATORY (INHALATION) at 11:32

## 2018-11-14 RX ADMIN — HEPARIN SODIUM 8340 UNITS: 1000 INJECTION INTRAVENOUS; SUBCUTANEOUS at 13:43

## 2018-11-14 RX ADMIN — CEFTRIAXONE SODIUM 1 G: 1 INJECTION, POWDER, FOR SOLUTION INTRAMUSCULAR; INTRAVENOUS at 17:03

## 2018-11-14 RX ADMIN — ASPIRIN 81 MG: 81 TABLET, COATED ORAL at 23:11

## 2018-11-14 RX ADMIN — IPRATROPIUM BROMIDE AND ALBUTEROL SULFATE 1 AMPULE: .5; 3 SOLUTION RESPIRATORY (INHALATION) at 11:20

## 2018-11-14 RX ADMIN — IOPAMIDOL 80 ML: 755 INJECTION, SOLUTION INTRAVENOUS at 12:32

## 2018-11-14 RX ADMIN — IPRATROPIUM BROMIDE AND ALBUTEROL SULFATE 1 AMPULE: .5; 3 SOLUTION RESPIRATORY (INHALATION) at 11:27

## 2018-11-14 RX ADMIN — FOLIC ACID 1 MG: 1 TABLET ORAL at 23:12

## 2018-11-14 RX ADMIN — BUMETANIDE 2 MG: 0.25 INJECTION INTRAMUSCULAR; INTRAVENOUS at 23:11

## 2018-11-14 ASSESSMENT — ENCOUNTER SYMPTOMS
SORE THROAT: 0
BACK PAIN: 1
EYE DISCHARGE: 0
COUGH: 0
SHORTNESS OF BREATH: 1
ABDOMINAL PAIN: 0
CONSTIPATION: 1
NAUSEA: 1
WHEEZING: 0
VOMITING: 0
EYE PAIN: 0
DIARRHEA: 0
RHINORRHEA: 0
BLOOD IN STOOL: 0

## 2018-11-14 ASSESSMENT — PAIN SCALES - GENERAL
PAINLEVEL_OUTOF10: 0

## 2018-11-14 NOTE — LETTER
suppliers that help patients recover after discharge from the hospital, including skilled nursing facilities, home health agencies, inpatient rehabilitation facilities, and long term care hospitals. [facility] is working closely with the doctors and other health care providers that care for you during and following your hospital stay and for a period of time after you leave the hospital. By working together, the health care providers are trying to more efficiently provide well-managed, high quality, patient-centered care as you undergo treatment. Hospitals, doctors, and other health care providers that care for you following a hospital stay may receive an additional payment for providing better, more coordinated health care. Medicare will monitor your care to make sure you and others get high quality care. Your feedback is important     Medicare may also ask you to answer a survey about the services and care you received from [facility]. The survey will be mailed to you. Your feedback will improve care for all people with Medicare who receive care from [facility]. Completion of this survey is optional.     Get more information     For more information about the Bundled Payments for [address] [place] [address], you can:    · Visit the CMS BPCI Advanced Website at [email or web address]-villaseñor.[email or web address]/ initiatives/bpci-advanced   · Call the [facility] BPCI-A team at [phone] [phone]. · Call 1-800-MEDICARE (3-876.619.7059). TTY users can call 0-554.553.9946     If you have concerns or complaints about your care, talk to your health care provider, or contact your Beneficiary and Family Centered Quality Improvement Organization NICOLA ÁLVAREZ [facility]). To get your [facility]'s phone number, visit Medicare.gov/contacts or call 1-800-MEDICARE. · To find a different hospital, visit www. [email or web address].gov or call

## 2018-11-14 NOTE — PROGRESS NOTES
The transport originated from ER. Pt. was transported to [facility]. Assisting with the transport was ER LPN. A defibrillator was brought along on transport. Appropriate devices were applied to monitor the patient's condition during transport. A transport backpack was brought on transport, to be used in case of emergency. Patient transported  via 70% O2 via BIPAP. Patient tolerated procedure well.

## 2018-11-14 NOTE — ED NOTES
Bed: 016A  Expected date:   Expected time:   Means of arrival:   Comments:  [id number] Merged with [facility] Burt Owens RN  11/14/18 1053

## 2018-11-14 NOTE — ED PROVIDER NOTES
rales. Patient is not moving air. Abdominal: Soft. Bowel sounds are normal. She exhibits no distension. There is no tenderness. There is no rebound, no guarding and no CVA tenderness. Musculoskeletal: Normal range of motion. She exhibits no edema. Neurological: She is alert and oriented to person, place, and time. She exhibits normal muscle tone. Coordination normal.   Skin: Skin is warm and dry. No rash noted. She is not diaphoretic. Nursing note and vitals reviewed. DIAGNOSTIC RESULTS     EKG: All EKG's are interpreted by the Emergency Department Physician who either signs or Co-signs this chart in the absence of a cardiologist.  EKG interpreted by Aden Robles MD:    Vent. Rate: 75 bpm  FL interval: * ms  QRS duration: 74 ms  QTc: 402 ms  P-R-T axes: *, 101, 36  Atrial fibrillation with premature ventricular or aberrantly conducted impulses   Rightward axis    Low voltage QRS  Cannot rule out anteroseptal infarct, age undetermined   No STEMI. RADIOLOGY: non-plain film images(s) such as CT, Ultrasound and MRI are read by the radiologist.    XR CHEST PORTABLE   Final Result   Cardiomegaly with bilateral interstitial opacities and bilateral pleural effusions consistent with CHF/pulmonary edema. Superimposed infection cannot be excluded. **This report has been created using voice recognition software. It may contain minor errors which are inherent in voice recognition technology. **      Final report electronically signed by Dr. Grace Box on 11/14/2018 1:09 PM      CTA Chest W WO Contrast   Final Result   No evidence for pulmonary arterial embolism within limitations of exam   Cardiomegaly. Bilateral pleural effusions with adjacent consolidation. Findings consistent with CHF. Superimposed infection cannot be excluded. Follow-up advised. Shotty mediastinal lymph nodes, likely reactive. **This report has been created using voice recognition software.  It may contain minor errors evaluated. MDM:  Patient was very short of breath with paradoxical breathing, patient's arterial blood gas showed some decompensation with increase in CO2 and decrease in pH. Patient was started on BiPAP 17 x 7 and was also given Lasix, morphine and nitro. Patient's d-dimer was elevated and patient's CT angiogram was negative for any pulmonary embolism but it showed congestive heart failure and possibility pneumonia. Clinically patient does not seem to have pneumonia, although patient has leukocytosis, which probably is because of  demargination. Patient was started on IV antibiotics to cover her up with the BiPAP. Patient has been doing much better. There is no more paradoxical breathing. I did talk to Dr. Patria Marti from cardiology and he will be the admitting physician. All of patient's lab the testing that reviewed discussed with the patient and the family in the room and also with the admitting physician. Patient influenza was also negative. Also does have atrial fibrillation on her EKG and I could not find in her record. The patient has history of A. fib. This probably has put the patient into congestive heart failure. Patient's family told me that they have seen Dr. Patria Marti as the cardiologist in the past    CRITICAL CARE:   Due to the immediate potential for life-threatening deterioration due to his breath, new onset A. fib , and just of heart failure I spent 35 minutes providing critical care. This time is excluding time spent performing procedures. CONSULTS:  Dr. Jada Che:  None     FINAL IMPRESSION      1. Congestive heart failure, unspecified HF chronicity, unspecified heart failure type ([facility] [address]    2.  New onset atrial fibrillation [facility])          DISPOSITION/PLAN   Admit    PATIENT REFERRED TO:  Rosa Kennedy [address] [place] [address] [phone]            DISCHARGE MEDICATIONS:  New Prescriptions    No medications on file       (Please note that portions of

## 2018-11-15 LAB
APTT: 105.6 SECONDS (ref 22–38)
APTT: 72.2 SECONDS (ref 22–38)
APTT: 74.8 SECONDS (ref 22–38)
CHOLESTEROL, TOTAL: 108 MG/DL (ref 100–199)
EKG ATRIAL RATE: 170 BPM
EKG Q-T INTERVAL: 406 MS
EKG QRS DURATION: 92 MS
EKG QTC CALCULATION (BAZETT): 438 MS
EKG R AXIS: 1 DEGREES
EKG T AXIS: 10 DEGREES
EKG VENTRICULAR RATE: 70 BPM
ERYTHROCYTE [DISTWIDTH] IN BLOOD BY AUTOMATED COUNT: 15.1 % (ref 11.5–14.5)
ERYTHROCYTE [DISTWIDTH] IN BLOOD BY AUTOMATED COUNT: 51.9 FL (ref 35–45)
HCT VFR BLD CALC: 36.6 % (ref 37–47)
HDLC SERPL-MCNC: 40 MG/DL
HEMOGLOBIN: 11.9 GM/DL (ref 12–16)
LDL CHOLESTEROL CALCULATED: 56 MG/DL
LV EF: 55 %
LVEF MODALITY: NORMAL
MCH RBC QN AUTO: 31.6 PG (ref 26–33)
MCHC RBC AUTO-ENTMCNC: 32.5 GM/DL (ref 32.2–35.5)
MCV RBC AUTO: 97.1 FL (ref 81–99)
PLATELET # BLD: 159 THOU/MM3 (ref 130–400)
PMV BLD AUTO: 10.3 FL (ref 9.4–12.4)
RBC # BLD: 3.77 MILL/MM3 (ref 4.2–5.4)
TRIGL SERPL-MCNC: 60 MG/DL (ref 0–199)
WBC # BLD: 9.8 THOU/MM3 (ref 4.8–10.8)

## 2018-11-15 PROCEDURE — 94660 CPAP INITIATION&MGMT: CPT

## 2018-11-15 PROCEDURE — 2140000000 HC CCU INTERMEDIATE R&B

## 2018-11-15 PROCEDURE — 93005 ELECTROCARDIOGRAM TRACING: CPT | Performed by: INTERNAL MEDICINE

## 2018-11-15 PROCEDURE — 6360000002 HC RX W HCPCS: Performed by: INTERNAL MEDICINE

## 2018-11-15 PROCEDURE — 36415 COLL VENOUS BLD VENIPUNCTURE: CPT

## 2018-11-15 PROCEDURE — 6370000000 HC RX 637 (ALT 250 FOR IP): Performed by: INTERNAL MEDICINE

## 2018-11-15 PROCEDURE — 2580000003 HC RX 258: Performed by: INTERNAL MEDICINE

## 2018-11-15 PROCEDURE — 85027 COMPLETE CBC AUTOMATED: CPT

## 2018-11-15 PROCEDURE — 93010 ELECTROCARDIOGRAM REPORT: CPT | Performed by: INTERNAL MEDICINE

## 2018-11-15 PROCEDURE — 80061 LIPID PANEL: CPT

## 2018-11-15 PROCEDURE — 93306 TTE W/DOPPLER COMPLETE: CPT

## 2018-11-15 PROCEDURE — 85730 THROMBOPLASTIN TIME PARTIAL: CPT

## 2018-11-15 PROCEDURE — 2500000003 HC RX 250 WO HCPCS: Performed by: INTERNAL MEDICINE

## 2018-11-15 RX ORDER — OXYBUTYNIN CHLORIDE 5 MG/1
10 TABLET ORAL EVERY OTHER DAY
Status: DISCONTINUED | OUTPATIENT
Start: 2018-11-15 | End: 2018-11-18 | Stop reason: HOSPADM

## 2018-11-15 RX ORDER — NAPROXEN SODIUM 220 MG
440 TABLET ORAL 2 TIMES DAILY PRN
COMMUNITY
End: 2019-11-27 | Stop reason: SINTOL

## 2018-11-15 RX ADMIN — OXYBUTYNIN CHLORIDE 10 MG: 5 TABLET ORAL at 12:12

## 2018-11-15 RX ADMIN — BUMETANIDE 2 MG: 0.25 INJECTION INTRAMUSCULAR; INTRAVENOUS at 09:00

## 2018-11-15 RX ADMIN — METOPROLOL TARTRATE 100 MG: 100 TABLET, FILM COATED ORAL at 21:57

## 2018-11-15 RX ADMIN — AMLODIPINE BESYLATE 10 MG: 10 TABLET ORAL at 08:59

## 2018-11-15 RX ADMIN — ACETAMINOPHEN 650 MG: 325 TABLET ORAL at 21:57

## 2018-11-15 RX ADMIN — METOPROLOL TARTRATE 100 MG: 100 TABLET, FILM COATED ORAL at 08:59

## 2018-11-15 RX ADMIN — HYDRALAZINE HYDROCHLORIDE 50 MG: 50 TABLET, FILM COATED ORAL at 06:36

## 2018-11-15 RX ADMIN — CITALOPRAM 20 MG: 20 TABLET, FILM COATED ORAL at 08:59

## 2018-11-15 RX ADMIN — Medication 10 ML: at 09:00

## 2018-11-15 RX ADMIN — HYDRALAZINE HYDROCHLORIDE 50 MG: 50 TABLET, FILM COATED ORAL at 00:38

## 2018-11-15 RX ADMIN — PRAVASTATIN SODIUM 40 MG: 40 TABLET ORAL at 21:56

## 2018-11-15 RX ADMIN — FOLIC ACID 1 MG: 1 TABLET ORAL at 08:59

## 2018-11-15 RX ADMIN — ASPIRIN 81 MG: 81 TABLET, COATED ORAL at 10:25

## 2018-11-15 RX ADMIN — HEPARIN SODIUM 12 UNITS/KG/HR: 10000 INJECTION, SOLUTION INTRAVENOUS at 07:48

## 2018-11-15 RX ADMIN — LISINOPRIL 20 MG: 20 TABLET ORAL at 08:59

## 2018-11-15 RX ADMIN — HYDRALAZINE HYDROCHLORIDE 50 MG: 50 TABLET, FILM COATED ORAL at 17:04

## 2018-11-15 RX ADMIN — BUMETANIDE 1 MG: 1 TABLET ORAL at 08:59

## 2018-11-15 RX ADMIN — LISINOPRIL 20 MG: 20 TABLET ORAL at 21:56

## 2018-11-15 RX ADMIN — ALLOPURINOL 100 MG: 100 TABLET ORAL at 08:59

## 2018-11-15 RX ADMIN — HYDRALAZINE HYDROCHLORIDE 50 MG: 50 TABLET, FILM COATED ORAL at 23:46

## 2018-11-15 RX ADMIN — LEVOTHYROXINE SODIUM 200 MCG: 100 TABLET ORAL at 06:36

## 2018-11-15 RX ADMIN — CELECOXIB 200 MG: 200 CAPSULE ORAL at 08:59

## 2018-11-15 RX ADMIN — PANTOPRAZOLE SODIUM 40 MG: 40 TABLET, DELAYED RELEASE ORAL at 06:36

## 2018-11-15 RX ADMIN — Medication 10 ML: at 21:58

## 2018-11-15 RX ADMIN — ASPIRIN 81 MG: 81 TABLET, COATED ORAL at 21:57

## 2018-11-15 RX ADMIN — BUMETANIDE 2 MG: 0.25 INJECTION INTRAMUSCULAR; INTRAVENOUS at 21:57

## 2018-11-15 ASSESSMENT — PAIN SCALES - GENERAL
PAINLEVEL_OUTOF10: 0
PAINLEVEL_OUTOF10: 1

## 2018-11-15 NOTE — PROGRESS NOTES
VS-  T- 98.1; P- 63; R- 17; BP- 109/61  Pt sitting up in bed, chatting with visitors- wanted to wait about bath until they left. Lung sounds still clear all around and heart sounds irregular with afib rhythm. O2 was 94, no SOB noted. Wheels locked, lowest position, and call light within reach.  Electronically signed by Sudeep Pappas on 11/15/2018 at 12:08 PM

## 2018-11-15 NOTE — PROGRESS NOTES
Pharmacy Medication History Note      List of current medications patient is taking is complete. Source of information: Patient, spouse, Surescripts    Changes made to medication list:  Medications removed (include reason, ex. therapy complete or physician discontinued):      Medications added/doses adjusted:  · Aleve- takes PRN      Other notes (ex. Recent course of antibiotics, Coumadin dosing):  · Pt admits to stopping Bumex >1 week ago because it \"made her pee too much\". Spouse was looking for the bottle this AM and believes it was thrown out as he can't find it. Will need a new Rx at discharge. · Denies use of other OTC or herbal medications.       Allergies reviewed      Electronically signed by Floyde Leventhal, [facility] on 11/15/2018 at 12:34 PM

## 2018-11-16 LAB
ANION GAP SERPL CALCULATED.3IONS-SCNC: 11 MEQ/L (ref 8–16)
APTT: 59.4 SECONDS (ref 22–38)
APTT: 88.3 SECONDS (ref 22–38)
BUN BLDV-MCNC: 26 MG/DL (ref 7–22)
CALCIUM SERPL-MCNC: 8.9 MG/DL (ref 8.5–10.5)
CHLORIDE BLD-SCNC: 102 MEQ/L (ref 98–111)
CO2: 29 MEQ/L (ref 23–33)
CREAT SERPL-MCNC: 1 MG/DL (ref 0.4–1.2)
GFR SERPL CREATININE-BSD FRML MDRD: 53 ML/MIN/1.73M2
GLUCOSE BLD-MCNC: 101 MG/DL (ref 70–108)
PLATELET # BLD: 178 THOU/MM3 (ref 130–400)
POTASSIUM SERPL-SCNC: 3.9 MEQ/L (ref 3.5–5.2)
SODIUM BLD-SCNC: 142 MEQ/L (ref 135–145)

## 2018-11-16 PROCEDURE — 2500000003 HC RX 250 WO HCPCS: Performed by: INTERNAL MEDICINE

## 2018-11-16 PROCEDURE — 2700000000 HC OXYGEN THERAPY PER DAY

## 2018-11-16 PROCEDURE — 2709999900 HC NON-CHARGEABLE SUPPLY

## 2018-11-16 PROCEDURE — 2140000000 HC CCU INTERMEDIATE R&B

## 2018-11-16 PROCEDURE — 6360000002 HC RX W HCPCS: Performed by: INTERNAL MEDICINE

## 2018-11-16 PROCEDURE — 94660 CPAP INITIATION&MGMT: CPT

## 2018-11-16 PROCEDURE — 80048 BASIC METABOLIC PNL TOTAL CA: CPT

## 2018-11-16 PROCEDURE — 6370000000 HC RX 637 (ALT 250 FOR IP): Performed by: INTERNAL MEDICINE

## 2018-11-16 PROCEDURE — 85730 THROMBOPLASTIN TIME PARTIAL: CPT

## 2018-11-16 PROCEDURE — 85049 AUTOMATED PLATELET COUNT: CPT

## 2018-11-16 PROCEDURE — 36415 COLL VENOUS BLD VENIPUNCTURE: CPT

## 2018-11-16 RX ORDER — BUMETANIDE 1 MG/1
2 TABLET ORAL 2 TIMES DAILY
Status: DISCONTINUED | OUTPATIENT
Start: 2018-11-16 | End: 2018-11-18 | Stop reason: HOSPADM

## 2018-11-16 RX ADMIN — PRAVASTATIN SODIUM 40 MG: 40 TABLET ORAL at 21:06

## 2018-11-16 RX ADMIN — OXYBUTYNIN CHLORIDE 5 MG: 5 TABLET ORAL at 10:19

## 2018-11-16 RX ADMIN — ASPIRIN 81 MG: 81 TABLET, COATED ORAL at 21:06

## 2018-11-16 RX ADMIN — POTASSIUM CHLORIDE 20 MEQ: 20 TABLET, EXTENDED RELEASE ORAL at 10:19

## 2018-11-16 RX ADMIN — ASPIRIN 81 MG: 81 TABLET, COATED ORAL at 10:20

## 2018-11-16 RX ADMIN — HYDRALAZINE HYDROCHLORIDE 50 MG: 50 TABLET, FILM COATED ORAL at 14:07

## 2018-11-16 RX ADMIN — LISINOPRIL 20 MG: 20 TABLET ORAL at 10:19

## 2018-11-16 RX ADMIN — PANTOPRAZOLE SODIUM 40 MG: 40 TABLET, DELAYED RELEASE ORAL at 06:15

## 2018-11-16 RX ADMIN — CITALOPRAM 20 MG: 20 TABLET, FILM COATED ORAL at 10:20

## 2018-11-16 RX ADMIN — ALLOPURINOL 100 MG: 100 TABLET ORAL at 10:20

## 2018-11-16 RX ADMIN — METOPROLOL TARTRATE 100 MG: 100 TABLET, FILM COATED ORAL at 21:06

## 2018-11-16 RX ADMIN — LEVOTHYROXINE SODIUM 200 MCG: 100 TABLET ORAL at 06:15

## 2018-11-16 RX ADMIN — ACETAMINOPHEN 650 MG: 325 TABLET ORAL at 21:05

## 2018-11-16 RX ADMIN — FOLIC ACID 1 MG: 1 TABLET ORAL at 10:20

## 2018-11-16 RX ADMIN — METOPROLOL TARTRATE 100 MG: 100 TABLET, FILM COATED ORAL at 10:19

## 2018-11-16 RX ADMIN — HEPARIN SODIUM 11.98 UNITS/KG/HR: 10000 INJECTION, SOLUTION INTRAVENOUS at 08:20

## 2018-11-16 RX ADMIN — BUMETANIDE 2 MG: 0.25 INJECTION INTRAMUSCULAR; INTRAVENOUS at 10:20

## 2018-11-16 RX ADMIN — CELECOXIB 200 MG: 200 CAPSULE ORAL at 10:20

## 2018-11-16 RX ADMIN — BUMETANIDE 1 MG: 1 TABLET ORAL at 10:20

## 2018-11-16 RX ADMIN — LISINOPRIL 20 MG: 20 TABLET ORAL at 21:06

## 2018-11-16 RX ADMIN — AMLODIPINE BESYLATE 10 MG: 10 TABLET ORAL at 10:20

## 2018-11-16 RX ADMIN — BUMETANIDE 2 MG: 1 TABLET ORAL at 21:06

## 2018-11-16 RX ADMIN — APIXABAN 5 MG: 5 TABLET, FILM COATED ORAL at 21:06

## 2018-11-16 ASSESSMENT — PAIN SCALES - GENERAL
PAINLEVEL_OUTOF10: 0
PAINLEVEL_OUTOF10: 0

## 2018-11-16 NOTE — PLAN OF CARE
Problem: Falls - Risk of:  Goal: Will remain free from falls  Will remain free from falls   Outcome: Ongoing  Assessment & interventions provided throughout shift. Bed locked & in low position, call light in reach, side-rails up x2, non-slip socks on when ambulating, reminded patient to use call light to call for assistance. Problem: Risk for Impaired Skin Integrity  Goal: Tissue integrity - skin and mucous membranes  Structural intactness and normal physiological function of skin and  mucous membranes. Outcome: Ongoing  Ongoing assessment & interventions provided throughout shift. Skin assessments provided. Encouraging/assisting patient to turn as needed. Problem: Discharge Planning:  Goal: Discharged to appropriate level of care  Discharged to appropriate level of care   Outcome: Ongoing  Patient plans to be discharged with     Problem: Activity Intolerance:  Goal: Ability to tolerate increased activity will improve  Ability to tolerate increased activity will improve   Outcome: Ongoing  Patient on bed rest at this time    Problem: Breathing Pattern - Ineffective:  Goal: Ability to achieve and maintain a regular respiratory rate will improve  Ability to achieve and maintain a regular respiratory rate will improve   Outcome: Ongoing      Comments: Care plan reviewed with patient. Patient verbalizes understanding of the care plan and contributed to goal setting.

## 2018-11-16 NOTE — H&P
MCV 97.1 11/15/2018    MCH 31.6 11/15/2018    MCHC 32.5 11/15/2018    RDW 12.9 03/27/2018     11/15/2018    MPV 10.3 11/15/2018     BMP  Lab Results   Component Value Date     11/14/2018    K 4.8 11/14/2018     11/14/2018    CO2 20 11/14/2018    BUN 33 11/14/2018    CREATININE 0.9 11/14/2018    CALCIUM 9.4 11/14/2018      COAG PROFILE:  Lab Results   Component Value Date    APTT 74.8 11/15/2018    INR 1.15 11/14/2018       Assessment:     Active Problems:    New onset atrial fibrillation (HCC)    A-fib (HCC)  Resolved Problems:    * No resolved hospital problems.  *      Plan:     *acute chf    cad prior stenting   dm    morbid obesity    htn    will ambulate    pt/ot    continue supportive rx

## 2018-11-16 NOTE — PROGRESS NOTES
Admit Date: 11/14/2018  Hospital day 3    Subjective:     Patient has complaints sob . [place] [address] Medication side effects: none    Scheduled Meds:   oxybutynin  10 mg Oral Every Other Day    azithromycin  500 mg Intravenous Once    sodium chloride flush  10 mL Intravenous 2 times per day    allopurinol  100 mg Oral Daily    amLODIPine  10 mg Oral Daily    aspirin  81 mg Oral BID    bumetanide  1 mg Oral Daily    citalopram  20 mg Oral Daily    folic acid  1 mg Oral Daily    hydrALAZINE  50 mg Oral 3 times per day    levothyroxine  200 mcg Oral Daily    lisinopril  20 mg Oral BID    metoprolol  100 mg Oral BID    celecoxib  200 mg Oral Daily    pantoprazole  40 mg Oral QAM AC    oxybutynin  5 mg Oral Every Other Day    potassium chloride  20 mEq Oral Daily    pravastatin  40 mg Oral Nightly    potassium (CARDIAC) replacement protocol   Other RX Placeholder    magnesium replacement protocol   Other RX Placeholder    phosphorus replacement protocol   Other RX Placeholder    calcium replacement protocol   Other RX Placeholder     Continuous Infusions:   nitroGLYCERIN 5 mcg/min (11/14/18 1133)    heparin (porcine) 13.998 Units/kg/hr (11/16/18 1706)     PRN Meds:heparin (porcine), heparin (porcine), sodium chloride flush, potassium chloride **OR** potassium chloride **OR** potassium chloride, magnesium hydroxide, ondansetron, docusate sodium, acetaminophen    Review of Systems  Pertinent items are noted in HPI. Objective:     Patient Vitals for the past 8 hrs:   BP Temp Temp src Pulse Resp SpO2   11/16/18 1643 (!) 147/87 98.6 °F (37 °C) Oral 74 20 96 %   11/16/18 1407 117/65 - - - - -   11/16/18 1145 (!) 147/70 97.6 °F (36.4 °C) Oral 67 20 95 %     I/O last 3 completed shifts: In: 2169.7 [P.O.:1870;  I.V.:299.7]  Out: 2625 [Urine:2625]    No change     ECG: af.   Data Review  CBC:   Lab Results   Component Value Date    WBC 9.8 11/15/2018    RBC 3.77 11/15/2018     BMP:   Lab Results   Component Value Date    GLUCOSE 101 11/16/2018    CO2 29 11/16/2018    BUN 26 11/16/2018    CREATININE 1.0 11/16/2018    CALCIUM 8.9 11/16/2018     Coagulation:   Lab Results   Component Value Date    INR 1.15 11/14/2018    APTT 88.3 11/16/2018     Cardiac markers:   Lab Results   Component Value Date    TROPONINT < 0.010 11/14/2018     ABG: No results found for: BDY7IMN, BEART, Z7DBZGOR, PHART, THGBART, MZB1MYM, PO2ART, TZK8QDL    Assessment:     Active Problems:    New onset atrial fibrillation (HCC)    A-fib ([facility] [address]  Resolved Problems:    * No resolved hospital problems.  *      Plan:      atrial fib , rate controlled    acute chf    csd    morbid obesity    poor mobilty    will need extended care facity    patient is dnr

## 2018-11-16 NOTE — DISCHARGE INSTR - COC
Nutrition Therapy:   - Oral Diet:  General    Routes of Feeding: Oral  Liquids: Thin Liquids  Daily Fluid Restriction: no  Last Modified Barium Swallow with Video (Video Swallowing Test): not done    Treatments at the Time of Hospital Discharge:   Respiratory Treatments: see MAR    Oxygen Therapy:  is on oxygen at 1 L/min per nasal cannula. Bi-pap at bedtime- settings: 17/7 with FI O2 40%  Ventilator:    - No ventilator support    Rehab Therapies: Physical Therapy and Occupational Therapy  Weight Bearing Status/Restrictions: No weight bearing restirctions  Other Medical Equipment (for information only, NOT a DME order):  wheelchair, walker and hospital bed  Other Treatments: na    Patient's personal belongings (please select all that are sent with patient):  [place]    RN SIGNATURE:  [id number] MANAGEMENT/SOCIAL WORK SECTION    Inpatient Status Date: 11/141/2018    Readmission Risk Assessment Score:  Readmission Risk              Risk of Unplanned Readmission:        15           Discharging to Facility/ Agency   · Name: Iain Brito  · Address: [address] [place] [address]  · Phone: [phone]  · Fax: [phone]    Dialysis Facility (if applicable)   · Name:  · Address:  · Dialysis Schedule:  · Phone:  · Fax:    / signature: Electronically signed by LUZMARIA Hoffmann LSW on 11/16/18 at 3:43 PM    PHYSICIAN SECTION    Prognosis: Good    Condition at Discharge: Stable    Rehab Potential (if transferring to Rehab): Fair    Recommended Labs or Other Treatments After Discharge: na      Physician Certification: I certify the above information and transfer of Lupe Marie  is necessary for the continuing treatment of the diagnosis listed and that she requires {Admit to Appropriate Level of Care:89296} for {GREATER/LESS:933793616} 30 days.      Update Admission H&P: {CHP DME Changes in EULUZ:741795491}    PHYSICIAN SIGNATURE:  {Esignature:[id number]}

## 2018-11-16 NOTE — CARE COORDINATION
11/15/18, 7:43 AM      Xavier Bustillos       Admitted from: ED   2018/ [facility] day: 1   Location: [address] [place], [place] [address]A Reason for admit: New onset atrial fibrillation ([address] [I48.91]  A-fib ([address] [I48.91] Status: Inpatient  Admit order signed?: no  - Dr Michael Cisneros  PMH:  has a past medical history of Arthritis; CAD (coronary artery disease); Cerebral artery occlusion with cerebral infarction ([address] Chronic kidney disease; GERD (gastroesophageal reflux disease); History of blood transfusion; Hyperlipidemia; Hypertension; Movement disorder; Pneumonia; and Thyroid disease. Procedure: na  Pertinent abnormal Imagin18  CTA chest  No evidence for pulmonary arterial embolism within limitations of exam. Cardiomegaly. Bilateral pleural effusions with adjacent consolidation. Findings consistent with CHF. Superimposed infection cannot be excluded. Follow-up advised. Shotty mediastinal lymph nodes, likely reactive      18  CXR  Cardiomegaly with bilateral interstitial opacities and bilateral pleural effusions consistent with CHF/pulmonary edema. Superimposed infection cannot be excluded.     Medications:  Scheduled Meds:   azithromycin  500 mg Intravenous Once    sodium chloride flush  10 mL Intravenous 2 times per day    allopurinol  100 mg Oral Daily    amLODIPine  10 mg Oral Daily    aspirin  81 mg Oral BID    bumetanide  1 mg Oral Daily    citalopram  20 mg Oral Daily    folic acid  1 mg Oral Daily    hydrALAZINE  50 mg Oral 3 times per day    levothyroxine  200 mcg Oral Daily    lisinopril  20 mg Oral BID    metoprolol  100 mg Oral BID    celecoxib  200 mg Oral Daily    pantoprazole  40 mg Oral QAM AC    oxybutynin  5 mg Oral Every Other Day    potassium chloride  20 mEq Oral Daily    pravastatin  40 mg Oral Nightly    bumetanide  2 mg Intravenous Q12H    potassium (CARDIAC) replacement protocol   Other RX Placeholder    magnesium replacement protocol   Other RX Placeholder   
11/16/18, 2:39 PM    DISCHARGE BARRIERS     SW completed referral to Awais at [facility]. SW spoke with Shane Eaton at [facility], they have the referral as well. Pending how patient does, she will go home with [facility] for rehab.
11/16/18, 3:35 PM    DISCHARGE BARRIERS    Patient has been accepted for placement at [facility].
 New onset atrial fibrillation (HCC)    A-fib [facility])       Inpatient Assessment  Care Transitions Summary    Care Transitions Inpatient Review  Medication Review  Are you able to afford your medications?:  Yes  How often do you have difficulty taking your medications?:  I always take them as prescribed. Housing Review  Who do you live with?:  Partner/Spouse/SO  Are you an active caregiver in your home?:  No  Social Support  Do you have a ?:  No  Do you have a [address], [facility]?:  No  Durable Medical Equipment  Patient DME:  Shoaib Forrest  Functional Review  Ability to seek help/take action for Emergent/Urgent situations i.e. fire, crime, inclement weather or health crisis.:  Needs Assistance  Ability handle personal hygiene needs (bathing/dressing/grooming):  Needs Assistance  Ability to manage medications:  Needs Assistance  Ability to prepare food:  Needs Assistance  Ability to maintain home (clean home, laundry):  Needs Assistance  Ability to drive and/or has transportation:  Needs Assistance  Ability to do shopping:  Needs Assistance  Ability to manage finances:  Needs Assistance  Is patient able to live independently?:  No  Hearing and Vision  Care Transitions Interventions         Follow Up  No future appointments.     Health Maintenance  Health Maintenance Due   Topic Date Due    DEXA (modify frequency per FRAX score)  01/11/2003       Arpan Deluca [facility]  [phone]

## 2018-11-17 PROCEDURE — 6370000000 HC RX 637 (ALT 250 FOR IP): Performed by: INTERNAL MEDICINE

## 2018-11-17 PROCEDURE — 2709999900 HC NON-CHARGEABLE SUPPLY

## 2018-11-17 PROCEDURE — 2140000000 HC CCU INTERMEDIATE R&B

## 2018-11-17 PROCEDURE — 2700000000 HC OXYGEN THERAPY PER DAY

## 2018-11-17 PROCEDURE — 2580000003 HC RX 258: Performed by: INTERNAL MEDICINE

## 2018-11-17 PROCEDURE — 94660 CPAP INITIATION&MGMT: CPT

## 2018-11-17 RX ORDER — HYDRALAZINE HYDROCHLORIDE 25 MG/1
25 TABLET, FILM COATED ORAL EVERY 12 HOURS SCHEDULED
Status: DISCONTINUED | OUTPATIENT
Start: 2018-11-17 | End: 2018-11-18 | Stop reason: HOSPADM

## 2018-11-17 RX ORDER — LISINOPRIL 10 MG/1
10 TABLET ORAL EVERY 12 HOURS
Status: DISCONTINUED | OUTPATIENT
Start: 2018-11-17 | End: 2018-11-18 | Stop reason: HOSPADM

## 2018-11-17 RX ORDER — AMLODIPINE BESYLATE 5 MG/1
5 TABLET ORAL DAILY
Status: DISCONTINUED | OUTPATIENT
Start: 2018-11-18 | End: 2018-11-18 | Stop reason: HOSPADM

## 2018-11-17 RX ADMIN — Medication 10 ML: at 09:07

## 2018-11-17 RX ADMIN — METOPROLOL TARTRATE 100 MG: 100 TABLET, FILM COATED ORAL at 20:05

## 2018-11-17 RX ADMIN — METOPROLOL TARTRATE 100 MG: 100 TABLET, FILM COATED ORAL at 12:24

## 2018-11-17 RX ADMIN — BUMETANIDE 2 MG: 1 TABLET ORAL at 10:30

## 2018-11-17 RX ADMIN — Medication 10 ML: at 20:06

## 2018-11-17 RX ADMIN — BUMETANIDE 2 MG: 1 TABLET ORAL at 20:05

## 2018-11-17 RX ADMIN — CELECOXIB 200 MG: 200 CAPSULE ORAL at 09:03

## 2018-11-17 RX ADMIN — ASPIRIN 81 MG: 81 TABLET, COATED ORAL at 09:03

## 2018-11-17 RX ADMIN — FOLIC ACID 1 MG: 1 TABLET ORAL at 09:03

## 2018-11-17 RX ADMIN — ALLOPURINOL 100 MG: 100 TABLET ORAL at 09:03

## 2018-11-17 RX ADMIN — POTASSIUM CHLORIDE 20 MEQ: 20 TABLET, EXTENDED RELEASE ORAL at 09:03

## 2018-11-17 RX ADMIN — HYDRALAZINE HYDROCHLORIDE 25 MG: 50 TABLET, FILM COATED ORAL at 22:12

## 2018-11-17 RX ADMIN — HYDRALAZINE HYDROCHLORIDE 50 MG: 50 TABLET, FILM COATED ORAL at 15:34

## 2018-11-17 RX ADMIN — APIXABAN 5 MG: 5 TABLET, FILM COATED ORAL at 20:04

## 2018-11-17 RX ADMIN — OXYBUTYNIN CHLORIDE 10 MG: 5 TABLET ORAL at 09:02

## 2018-11-17 RX ADMIN — ASPIRIN 81 MG: 81 TABLET, COATED ORAL at 20:05

## 2018-11-17 RX ADMIN — APIXABAN 5 MG: 5 TABLET, FILM COATED ORAL at 09:03

## 2018-11-17 RX ADMIN — LEVOTHYROXINE SODIUM 200 MCG: 100 TABLET ORAL at 09:02

## 2018-11-17 RX ADMIN — LISINOPRIL 10 MG: 20 TABLET ORAL at 22:12

## 2018-11-17 RX ADMIN — CITALOPRAM 20 MG: 20 TABLET, FILM COATED ORAL at 09:03

## 2018-11-17 RX ADMIN — PRAVASTATIN SODIUM 40 MG: 40 TABLET ORAL at 20:05

## 2018-11-17 RX ADMIN — AMLODIPINE BESYLATE 10 MG: 10 TABLET ORAL at 15:34

## 2018-11-17 RX ADMIN — ACETAMINOPHEN 650 MG: 325 TABLET ORAL at 20:05

## 2018-11-17 RX ADMIN — PANTOPRAZOLE SODIUM 40 MG: 40 TABLET, DELAYED RELEASE ORAL at 09:03

## 2018-11-17 RX ADMIN — LISINOPRIL 20 MG: 20 TABLET ORAL at 12:24

## 2018-11-17 ASSESSMENT — PAIN SCALES - GENERAL: PAINLEVEL_OUTOF10: 0

## 2018-11-17 NOTE — PLAN OF CARE
Problem: Falls - Risk of:  Goal: Will remain free from falls  Will remain free from falls   Outcome: Ongoing  Assessment & interventions provided throughout shift. Bed locked & in low position, call light in reach, side-rails up x2, non-slip socks on when ambulating, reminded patient to use call light to call for assistance. Problem: Risk for Impaired Skin Integrity  Goal: Tissue integrity - skin and mucous membranes  Structural intactness and normal physiological function of skin and  mucous membranes. Outcome: Ongoing  Ongoing assessment & interventions provided throughout shift. Skin assessments provided. Encouraging/assisting patient to turn as needed. Problem: Discharge Planning:  Goal: Discharged to appropriate level of care  Discharged to appropriate level of care   Outcome: Ongoing  [facility]    Comments: Care plan reviewed with patient. Patient verbalizes understanding of the care plan and contributed to goal setting.

## 2018-11-18 VITALS
HEIGHT: 63 IN | WEIGHT: 221.7 LBS | RESPIRATION RATE: 20 BRPM | TEMPERATURE: 98.1 F | BODY MASS INDEX: 39.28 KG/M2 | SYSTOLIC BLOOD PRESSURE: 119 MMHG | DIASTOLIC BLOOD PRESSURE: 69 MMHG | OXYGEN SATURATION: 94 % | HEART RATE: 72 BPM

## 2018-11-18 LAB — PLATELET # BLD: 177 THOU/MM3 (ref 130–400)

## 2018-11-18 PROCEDURE — 2709999900 HC NON-CHARGEABLE SUPPLY

## 2018-11-18 PROCEDURE — 85049 AUTOMATED PLATELET COUNT: CPT

## 2018-11-18 PROCEDURE — 6370000000 HC RX 637 (ALT 250 FOR IP): Performed by: INTERNAL MEDICINE

## 2018-11-18 PROCEDURE — 94660 CPAP INITIATION&MGMT: CPT

## 2018-11-18 PROCEDURE — 97162 PT EVAL MOD COMPLEX 30 MIN: CPT

## 2018-11-18 PROCEDURE — G8979 MOBILITY GOAL STATUS: HCPCS

## 2018-11-18 PROCEDURE — 36415 COLL VENOUS BLD VENIPUNCTURE: CPT

## 2018-11-18 PROCEDURE — 2700000000 HC OXYGEN THERAPY PER DAY

## 2018-11-18 PROCEDURE — 97530 THERAPEUTIC ACTIVITIES: CPT

## 2018-11-18 PROCEDURE — G8978 MOBILITY CURRENT STATUS: HCPCS

## 2018-11-18 RX ORDER — BUMETANIDE 2 MG/1
2 TABLET ORAL 2 TIMES DAILY
Qty: 30 TABLET | Refills: 3 | Status: SHIPPED | OUTPATIENT
Start: 2018-11-18 | End: 2020-02-13

## 2018-11-18 RX ORDER — AMLODIPINE BESYLATE 5 MG/1
5 TABLET ORAL DAILY
Qty: 30 TABLET | Refills: 3 | Status: SHIPPED | OUTPATIENT
Start: 2018-11-19 | End: 2019-11-27 | Stop reason: SDUPTHER

## 2018-11-18 RX ORDER — BENAZEPRIL HYDROCHLORIDE 10 MG/1
10 TABLET ORAL DAILY
Qty: 30 TABLET | Refills: 3 | Status: SHIPPED | OUTPATIENT
Start: 2018-11-18 | End: 2020-02-13

## 2018-11-18 RX ADMIN — BUMETANIDE 2 MG: 1 TABLET ORAL at 08:20

## 2018-11-18 RX ADMIN — ASPIRIN 81 MG: 81 TABLET, COATED ORAL at 08:20

## 2018-11-18 RX ADMIN — AMLODIPINE BESYLATE 5 MG: 5 TABLET ORAL at 08:20

## 2018-11-18 RX ADMIN — FOLIC ACID 1 MG: 1 TABLET ORAL at 08:20

## 2018-11-18 RX ADMIN — CELECOXIB 200 MG: 200 CAPSULE ORAL at 08:22

## 2018-11-18 RX ADMIN — METOPROLOL TARTRATE 100 MG: 100 TABLET, FILM COATED ORAL at 12:02

## 2018-11-18 RX ADMIN — ALLOPURINOL 100 MG: 100 TABLET ORAL at 08:20

## 2018-11-18 RX ADMIN — POTASSIUM CHLORIDE 20 MEQ: 20 TABLET, EXTENDED RELEASE ORAL at 08:20

## 2018-11-18 RX ADMIN — APIXABAN 5 MG: 5 TABLET, FILM COATED ORAL at 08:20

## 2018-11-18 RX ADMIN — CITALOPRAM 20 MG: 20 TABLET, FILM COATED ORAL at 08:20

## 2018-11-18 RX ADMIN — HYDRALAZINE HYDROCHLORIDE 25 MG: 50 TABLET, FILM COATED ORAL at 08:21

## 2018-11-18 RX ADMIN — PANTOPRAZOLE SODIUM 40 MG: 40 TABLET, DELAYED RELEASE ORAL at 08:20

## 2018-11-18 RX ADMIN — OXYBUTYNIN CHLORIDE 5 MG: 5 TABLET ORAL at 08:22

## 2018-11-18 RX ADMIN — LISINOPRIL 10 MG: 20 TABLET ORAL at 12:02

## 2018-11-18 RX ADMIN — LEVOTHYROXINE SODIUM 200 MCG: 100 TABLET ORAL at 08:20

## 2018-11-18 ASSESSMENT — PAIN SCALES - GENERAL
PAINLEVEL_OUTOF10: 0

## 2018-11-18 NOTE — PROGRESS NOTES
32.5 11/15/2018    RDW 12.9 03/27/2018     11/16/2018    MPV 10.3 11/15/2018     BMP  Lab Results   Component Value Date     11/16/2018    K 3.9 11/16/2018     11/16/2018    CO2 29 11/16/2018    BUN 26 11/16/2018    CREATININE 1.0 11/16/2018    CALCIUM 8.9 11/16/2018      COAG PROFILE:  Lab Results   Component Value Date    APTT 88.3 11/16/2018    INR 1.15 11/14/2018       Assessment:     Active Problems:    New onset atrial fibrillation (HCC)    A-fib (HCC)  Resolved Problems:    * No resolved hospital problems.  *      Plan:       Patient is still sob    h/o cad    dm    obesity    htn    anaemia

## 2018-11-18 NOTE — PROGRESS NOTES
Dr. Luis Marion in to see the pt. Discharge instructions were received. Transport papers were filled out, faxed to [employer] her  was contacted to report that Mili Subramanian would be going home but a time of transport had not been estaqblished. AVS and last dose MAR faxed to the [address] [place] [address]. Report was called to BEVERLY Shaw.  [facility] was called for transport time. Dispatcher stated that the pt. Could not be transported home tonight and would go tomorrow morning at 10:30 AM  Mark Orozco was called for an update on transport time. Beverly Shaw at the [facility] was called and I reported discharge/transport time tomorrow. The is aware and she states that she feels better about going in the morning. Currently resting in the bed. Disposable diaper and paper chux placed on the pt, due to incont.

## 2018-11-18 NOTE — PLAN OF CARE
Problem: Falls - Risk of:  Goal: Will remain free from falls  Will remain free from falls   Outcome: Ongoing  Assessment & interventions provided throughout shift. Bed locked & in low position, call light in reach, side-rails up x2, non-slip socks on when ambulating, reminded patient to use call light to call for assistance. Problem: Risk for Impaired Skin Integrity  Goal: Tissue integrity - skin and mucous membranes  Structural intactness and normal physiological function of skin and  mucous membranes. Outcome: Ongoing  Ongoing assessment & interventions provided throughout shift. Skin assessments provided. Encouraging/assisting patient to turn as needed. Problem: Discharge Planning:  Goal: Discharged to appropriate level of care  Discharged to appropriate level of care   Outcome: Ongoing  Patient plans to be discharged to [facility]      Problem: Activity Intolerance:  Goal: Ability to tolerate increased activity will improve  Ability to tolerate increased activity will improve   Outcome: Ongoing      Problem: Breathing Pattern - Ineffective:  Goal: Ability to achieve and maintain a regular respiratory rate will improve  Ability to achieve and maintain a regular respiratory rate will improve   Outcome: Ongoing      Comments: Care plan reviewed with patient. Patient verbalizes understanding of the care plan and contributed to goal setting.

## 2018-11-18 NOTE — PROGRESS NOTES
bathes)  Bathroom Equipment: Shower chair    Receives Help From: Family  ADL Assistance: Needs assistance  Homemaking Assistance: Needs assistance  Ambulation Assistance: Independent  Transfer Assistance: Independent    Active : No     Additional Comments: Patient uses a RW at all times for mobility. Objective:       RLE AROM: WNL  LLE AROM : WNL    Strength RLE:  (hip flexion: 2+/5; knee flexion: 3+/5, knee extension: 3+/5; ankle DF 0/5)    Strength LLE:  (grossly 4/5)         Sensation  Overall Sensation Status: WNL    RLE Tone: Normotonic  LLE Tone: Normotonic       Balance  Posture: Fair  Sitting - Static: Good  Sitting - Dynamic: Good  Standing - Static: Fair  Standing - Dynamic: Poor    Supine to Sit: Moderate assistance (with HOB raised)    Transfers  Sit to Stand: Minimal Assistance (verbal cues for hand placement and upright posture once standing)  Stand to sit: Minimal Assistance (verbal cues to reach back with hands for safety)  Bed to Chair: Minimal assistance (using RW)       Ambulation 1  Surface: level tile  Device: Rolling Walker  Other Apparatus: O2  Assistance: Minimal assistance  Quality of Gait: short step lengths, decreased B foot clearance, slightly flexed posture  Distance: 2' to chair  Comments: verbal cues for upright posture, safety and use of walker      Activity Tolerance:  Activity Tolerance: Patient Tolerated treatment well;Patient limited by endurance; Patient limited by fatigue    Treatment Initiated: PT eval completed. Also completed transfer training, see above. Assessment: Body structures, Functions, Activity limitations: Decreased functional mobility , Decreased strength, Decreased endurance, Decreased balance  Assessment: Patient tolerated PT session fairly well, although she fatigues easily with activity. She currently requires min-mod A to complete mobility due to weakness, decreased balance and decreased endurance.   She will benefit from continued PT in order to maximize her safety and functional independence. Prognosis: Good    Clinical Presentation: Moderate - Evolving with Changing Characteristics: . Due to an analysis of data from a detailed assessment, a consideration of several treatment options, the presence of co morbidities that affect the POC, and the need for minimal to moderate modifications or assistance needed to complete the evaluation. Decision Making: Moderate Complexitybased on patient assessment and decision making process of determining plan of care and establishing reasonable expectations for measurable functional outcomes    REQUIRES PT FOLLOW UP: Yes    Discharge Recommendations:  Discharge Recommendations: [address]    Patient Education:  Patient Education: Role of PT, POC, transfers, safety    Equipment Recommendations:  Equipment Needed: No    Safety:  Type of devices: All fall risk precautions in place, Call light within reach, Left in chair, Chair alarm in place, Gait belt    Plan:  Times per week: 3-5x GM  Times per day: Daily  Current Treatment Recommendations: Strengthening, Balance Training, Functional Mobility Training, Transfer Training, Gait Training, Equipment Evaluation, Education, & procurement, Patient/Caregiver Education & Training, Endurance Training, Safety Education & Training    Goals:  Patient goals : Get stronger  Short term goals  Time Frame for Short term goals: 2 weeks  Short term goal 1: Patient will transfer supine <--> sit with SBA in order to get into/out of bed. Short term goal 2: Patient will transfer sit <--> stand with SBA in order to get up to ambulate. Short term goal 3: Patient will ambulate 22' with CGA and a RW in order to get to the bathroom.   Long term goals  Time Frame for Long term goals : No LTGS due to short ELOS    Evaluation Complexity: Based on the findings of patient history, examination, clinical presentation, and decision making during this evaluation, the evaluation

## 2018-11-20 LAB
BLOOD CULTURE, ROUTINE: NORMAL
BLOOD CULTURE, ROUTINE: NORMAL

## 2019-10-04 LAB
ALBUMIN SERPL-MCNC: 4.1 G/DL
ALP BLD-CCNC: 103 U/L
ALT SERPL-CCNC: 17 U/L
ANION GAP SERPL CALCULATED.3IONS-SCNC: 14 MMOL/L
AST SERPL-CCNC: 13 U/L
BASOPHILS ABSOLUTE: NORMAL
BASOPHILS RELATIVE PERCENT: NORMAL
BILIRUB SERPL-MCNC: 0.7 MG/DL (ref 0.1–1.4)
BUN BLDV-MCNC: 59 MG/DL
CALCIUM SERPL-MCNC: 10 MG/DL
CHLORIDE BLD-SCNC: 101 MMOL/L
CHOLESTEROL, TOTAL: 123 MG/DL
CHOLESTEROL/HDL RATIO: 3.2
CO2: 28 MMOL/L
CREAT SERPL-MCNC: 1.5 MG/DL
EOSINOPHILS ABSOLUTE: NORMAL
EOSINOPHILS RELATIVE PERCENT: NORMAL
GFR CALCULATED: 33
GLUCOSE BLD-MCNC: 96 MG/DL
HCT VFR BLD CALC: 39 % (ref 36–46)
HDLC SERPL-MCNC: 38 MG/DL (ref 35–70)
HEMOGLOBIN: 13.7 G/DL (ref 12–16)
LDL CHOLESTEROL CALCULATED: 61 MG/DL (ref 0–160)
LYMPHOCYTES ABSOLUTE: NORMAL
LYMPHOCYTES RELATIVE PERCENT: NORMAL
MCH RBC QN AUTO: NORMAL PG
MCHC RBC AUTO-ENTMCNC: NORMAL G/DL
MCV RBC AUTO: NORMAL FL
MONOCYTES ABSOLUTE: NORMAL
MONOCYTES RELATIVE PERCENT: NORMAL
NEUTROPHILS ABSOLUTE: NORMAL
NEUTROPHILS RELATIVE PERCENT: NORMAL
PLATELET # BLD: 187 K/ΜL
PMV BLD AUTO: NORMAL FL
POTASSIUM SERPL-SCNC: 4.2 MMOL/L
RBC # BLD: 3.75 10^6/ΜL
SODIUM BLD-SCNC: 143 MMOL/L
TOTAL PROTEIN: 6.9
TRIGL SERPL-MCNC: 119 MG/DL
VLDLC SERPL CALC-MCNC: 24 MG/DL
WBC # BLD: 9.1 10^3/ML

## 2019-11-11 NOTE — PROGRESS NOTES
1520: Patient arrived per cart to 3B. Heart monitor applied and vitals taken. Admission paperwork completed. Explained to patient that . [facility] is not responsible for any lost or stolen items. Patient verbalized understanding. Oriented to room and use of call light and bed controls. Patient denies pain or needs. No signs of distress noted. Bed locked & in low position, side-rails up x2. Call light in reach. Reminded patient to call nurse if any needs arise. Humira Pregnancy And Lactation Text: This medication is Pregnancy Category B and is considered safe during pregnancy. It is unknown if this medication is excreted in breast milk.

## 2019-11-27 ENCOUNTER — OFFICE VISIT (OUTPATIENT)
Dept: NEPHROLOGY | Age: 79
End: 2019-11-27
Payer: MEDICARE

## 2019-11-27 ENCOUNTER — TELEPHONE (OUTPATIENT)
Dept: NEPHROLOGY | Age: 79
End: 2019-11-27

## 2019-11-27 VITALS
DIASTOLIC BLOOD PRESSURE: 73 MMHG | WEIGHT: 229 LBS | SYSTOLIC BLOOD PRESSURE: 105 MMHG | BODY MASS INDEX: 39.09 KG/M2 | HEIGHT: 64 IN | OXYGEN SATURATION: 97 % | HEART RATE: 84 BPM

## 2019-11-27 DIAGNOSIS — N17.9 AKI (ACUTE KIDNEY INJURY) (HCC): Primary | ICD-10-CM

## 2019-11-27 DIAGNOSIS — N18.30 CKD (CHRONIC KIDNEY DISEASE), STAGE III (HCC): ICD-10-CM

## 2019-11-27 PROCEDURE — 4040F PNEUMOC VAC/ADMIN/RCVD: CPT | Performed by: INTERNAL MEDICINE

## 2019-11-27 PROCEDURE — G8400 PT W/DXA NO RESULTS DOC: HCPCS | Performed by: INTERNAL MEDICINE

## 2019-11-27 PROCEDURE — G8417 CALC BMI ABV UP PARAM F/U: HCPCS | Performed by: INTERNAL MEDICINE

## 2019-11-27 PROCEDURE — G8427 DOCREV CUR MEDS BY ELIG CLIN: HCPCS | Performed by: INTERNAL MEDICINE

## 2019-11-27 PROCEDURE — G8484 FLU IMMUNIZE NO ADMIN: HCPCS | Performed by: INTERNAL MEDICINE

## 2019-11-27 PROCEDURE — 1123F ACP DISCUSS/DSCN MKR DOCD: CPT | Performed by: INTERNAL MEDICINE

## 2019-11-27 PROCEDURE — 1036F TOBACCO NON-USER: CPT | Performed by: INTERNAL MEDICINE

## 2019-11-27 PROCEDURE — 1090F PRES/ABSN URINE INCON ASSESS: CPT | Performed by: INTERNAL MEDICINE

## 2019-11-27 PROCEDURE — 99203 OFFICE O/P NEW LOW 30 MIN: CPT | Performed by: INTERNAL MEDICINE

## 2019-11-27 RX ORDER — ROSUVASTATIN CALCIUM 5 MG/1
5 TABLET, COATED ORAL DAILY
Status: ON HOLD | COMMUNITY
End: 2021-12-04

## 2019-11-27 RX ORDER — AMLODIPINE BESYLATE 5 MG/1
2.5 TABLET ORAL DAILY
Qty: 30 TABLET | Refills: 3
Start: 2019-11-27 | End: 2020-02-13

## 2019-11-27 RX ORDER — LISINOPRIL 10 MG/1
10 TABLET ORAL DAILY
Status: ON HOLD | COMMUNITY
End: 2021-12-11 | Stop reason: SDUPTHER

## 2019-11-27 RX ORDER — FAMOTIDINE 20 MG/1
20 TABLET, FILM COATED ORAL NIGHTLY
Qty: 30 TABLET | Refills: 5 | Status: SHIPPED | OUTPATIENT
Start: 2019-11-27

## 2019-12-16 ENCOUNTER — TELEPHONE (OUTPATIENT)
Dept: NEPHROLOGY | Age: 79
End: 2019-12-16

## 2019-12-16 ENCOUNTER — HOSPITAL ENCOUNTER (OUTPATIENT)
Dept: ULTRASOUND IMAGING | Age: 79
Discharge: HOME | End: 2019-12-16
Payer: MEDICARE

## 2019-12-16 DIAGNOSIS — N17.9 AKI (ACUTE KIDNEY INJURY) (HCC): ICD-10-CM

## 2019-12-16 DIAGNOSIS — N18.30 CKD (CHRONIC KIDNEY DISEASE), STAGE III (HCC): ICD-10-CM

## 2019-12-16 PROCEDURE — 76770 US EXAM ABDO BACK WALL COMP: CPT

## 2020-01-27 ENCOUNTER — TELEPHONE (OUTPATIENT)
Dept: NEPHROLOGY | Age: 80
End: 2020-01-27

## 2020-01-27 NOTE — TELEPHONE ENCOUNTER
Called to confirm 1.29.20 appointment. Marin Mccartney answered the phone and states patient is having a hard time breathing and legs are swelled. I stated it may be a good idea to keep the appointment. Marin Mccartney says I do not think she want to go out and he hung up on me.

## 2020-02-13 ENCOUNTER — HOSPITAL ENCOUNTER (INPATIENT)
Age: 80
LOS: 4 days | Discharge: NURSING, REHAB OR LONG-TERM CARE | DRG: 085 | End: 2020-02-17
Attending: EMERGENCY MEDICINE | Admitting: SURGERY
Payer: MEDICARE

## 2020-02-13 ENCOUNTER — APPOINTMENT (OUTPATIENT)
Dept: GENERAL RADIOLOGY | Age: 80
DRG: 085 | End: 2020-02-13
Payer: MEDICARE

## 2020-02-13 ENCOUNTER — APPOINTMENT (OUTPATIENT)
Dept: CT IMAGING | Age: 80
DRG: 085 | End: 2020-02-13
Payer: MEDICARE

## 2020-02-13 PROBLEM — I61.5 INTRAVENTRICULAR HEMORRHAGE (HCC): Status: ACTIVE | Noted: 2020-02-13
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
## 2020-02-13 PROBLEM — S00.01XA SCALP ABRASION: Status: ACTIVE | Noted: 2020-02-13

## 2020-02-13 LAB
ALBUMIN SERPL-MCNC: 4.2 G/DL (ref 3.5–5.1)
ALP BLD-CCNC: 89 U/L (ref 38–126)
ALT SERPL-CCNC: 23 U/L (ref 11–66)
ANION GAP SERPL CALCULATED.3IONS-SCNC: 10 MEQ/L (ref 8–16)
APTT: 35.7 SECONDS (ref 22–38)
AST SERPL-CCNC: 19 U/L (ref 5–40)
BASOPHILS # BLD: 0.4 %
BASOPHILS ABSOLUTE: 0 THOU/MM3 (ref 0–0.1)
BILIRUB SERPL-MCNC: 0.6 MG/DL (ref 0.3–1.2)
BUN BLDV-MCNC: 34 MG/DL (ref 7–22)
CALCIUM SERPL-MCNC: 9.4 MG/DL (ref 8.5–10.5)
CHLORIDE BLD-SCNC: 104 MEQ/L (ref 98–111)
CO2: 29 MEQ/L (ref 23–33)
CREAT SERPL-MCNC: 1 MG/DL (ref 0.4–1.2)
EOSINOPHIL # BLD: 1.5 %
EOSINOPHILS ABSOLUTE: 0.2 THOU/MM3 (ref 0–0.4)
ERYTHROCYTE [DISTWIDTH] IN BLOOD BY AUTOMATED COUNT: 15 % (ref 11.5–14.5)
ERYTHROCYTE [DISTWIDTH] IN BLOOD BY AUTOMATED COUNT: 57.8 FL (ref 35–45)
GFR SERPL CREATININE-BSD FRML MDRD: 53 ML/MIN/1.73M2
GLUCOSE BLD-MCNC: 107 MG/DL (ref 70–108)
HCT VFR BLD CALC: 39.4 % (ref 37–47)
HEMOGLOBIN: 12.4 GM/DL (ref 12–16)
IMMATURE GRANS (ABS): 0.08 THOU/MM3 (ref 0–0.07)
IMMATURE GRANULOCYTES: 0.8 %
INR BLD: 1.23 (ref 0.85–1.13)
LYMPHOCYTES # BLD: 10.3 %
LYMPHOCYTES ABSOLUTE: 1.1 THOU/MM3 (ref 1–4.8)
MCH RBC QN AUTO: 33 PG (ref 26–33)
MCHC RBC AUTO-ENTMCNC: 31.5 GM/DL (ref 32.2–35.5)
MCV RBC AUTO: 104.8 FL (ref 81–99)
MONOCYTES # BLD: 7.8 %
MONOCYTES ABSOLUTE: 0.8 THOU/MM3 (ref 0.4–1.3)
MRSA SCREEN RT-PCR: NEGATIVE
NUCLEATED RED BLOOD CELLS: 0 /100 WBC
OSMOLALITY CALCULATION: 293.1 MOSMOL/KG (ref 275–300)
PLATELET # BLD: 172 THOU/MM3 (ref 130–400)
PMV BLD AUTO: 10.3 FL (ref 9.4–12.4)
POTASSIUM SERPL-SCNC: 4.4 MEQ/L (ref 3.5–5.2)
PRO-BNP: 2701 PG/ML (ref 0–1800)
RBC # BLD: 3.76 MILL/MM3 (ref 4.2–5.4)
SEG NEUTROPHILS: 79.2 %
SEGMENTED NEUTROPHILS ABSOLUTE COUNT: 8.2 THOU/MM3 (ref 1.8–7.7)
SODIUM BLD-SCNC: 143 MEQ/L (ref 135–145)
TOTAL PROTEIN: 6.8 G/DL (ref 6.1–8)
TROPONIN T: 0.01 NG/ML
VANCOMYCIN RESISTANT ENTEROCOCCUS: NEGATIVE
WBC # BLD: 10.3 THOU/MM3 (ref 4.8–10.8)

## 2020-02-13 PROCEDURE — 87500 VANOMYCIN DNA AMP PROBE: CPT

## 2020-02-13 PROCEDURE — 72125 CT NECK SPINE W/O DYE: CPT

## 2020-02-13 PROCEDURE — 2000000000 HC ICU R&B

## 2020-02-13 PROCEDURE — 99223 1ST HOSP IP/OBS HIGH 75: CPT | Performed by: SURGERY

## 2020-02-13 PROCEDURE — 2709999900 HC NON-CHARGEABLE SUPPLY

## 2020-02-13 PROCEDURE — 72170 X-RAY EXAM OF PELVIS: CPT

## 2020-02-13 PROCEDURE — 85025 COMPLETE CBC W/AUTO DIFF WBC: CPT

## 2020-02-13 PROCEDURE — 2580000003 HC RX 258: Performed by: PHYSICIAN ASSISTANT

## 2020-02-13 PROCEDURE — 99223 1ST HOSP IP/OBS HIGH 75: CPT | Performed by: INTERNAL MEDICINE

## 2020-02-13 PROCEDURE — 71046 X-RAY EXAM CHEST 2 VIEWS: CPT

## 2020-02-13 PROCEDURE — 99291 CRITICAL CARE FIRST HOUR: CPT | Performed by: NEUROLOGICAL SURGERY

## 2020-02-13 PROCEDURE — 84484 ASSAY OF TROPONIN QUANT: CPT

## 2020-02-13 PROCEDURE — 94761 N-INVAS EAR/PLS OXIMETRY MLT: CPT

## 2020-02-13 PROCEDURE — 6820000001 HC L2 TRAUMA SURGERY EVALUATION

## 2020-02-13 PROCEDURE — 83880 ASSAY OF NATRIURETIC PEPTIDE: CPT

## 2020-02-13 PROCEDURE — 6370000000 HC RX 637 (ALT 250 FOR IP): Performed by: PHYSICIAN ASSISTANT

## 2020-02-13 PROCEDURE — APPSS60 APP SPLIT SHARED TIME 46-60 MINUTES: Performed by: PHYSICIAN ASSISTANT

## 2020-02-13 PROCEDURE — 36415 COLL VENOUS BLD VENIPUNCTURE: CPT

## 2020-02-13 PROCEDURE — 99285 EMERGENCY DEPT VISIT HI MDM: CPT

## 2020-02-13 PROCEDURE — 87081 CULTURE SCREEN ONLY: CPT

## 2020-02-13 PROCEDURE — APPSS180 APP SPLIT SHARED TIME > 60 MINUTES: Performed by: NURSE PRACTITIONER

## 2020-02-13 PROCEDURE — 93005 ELECTROCARDIOGRAM TRACING: CPT | Performed by: PHYSICIAN ASSISTANT

## 2020-02-13 PROCEDURE — 80053 COMPREHEN METABOLIC PANEL: CPT

## 2020-02-13 PROCEDURE — 85730 THROMBOPLASTIN TIME PARTIAL: CPT

## 2020-02-13 PROCEDURE — 85610 PROTHROMBIN TIME: CPT

## 2020-02-13 PROCEDURE — 87641 MR-STAPH DNA AMP PROBE: CPT

## 2020-02-13 PROCEDURE — 93005 ELECTROCARDIOGRAM TRACING: CPT | Performed by: NURSE PRACTITIONER

## 2020-02-13 PROCEDURE — 70450 CT HEAD/BRAIN W/O DYE: CPT

## 2020-02-13 RX ORDER — METOPROLOL TARTRATE 100 MG/1
100 TABLET ORAL 2 TIMES DAILY
COMMUNITY

## 2020-02-13 RX ORDER — METOLAZONE 5 MG/1
5 TABLET ORAL DAILY
Status: DISCONTINUED | OUTPATIENT
Start: 2020-02-14 | End: 2020-02-17 | Stop reason: HOSPADM

## 2020-02-13 RX ORDER — FAMOTIDINE 20 MG/1
20 TABLET, FILM COATED ORAL NIGHTLY
Status: DISCONTINUED | OUTPATIENT
Start: 2020-02-13 | End: 2020-02-17 | Stop reason: HOSPADM

## 2020-02-13 RX ORDER — CITALOPRAM 20 MG/1
20 TABLET ORAL NIGHTLY
Status: DISCONTINUED | OUTPATIENT
Start: 2020-02-13 | End: 2020-02-17 | Stop reason: HOSPADM

## 2020-02-13 RX ORDER — BUMETANIDE 0.25 MG/ML
2 INJECTION, SOLUTION INTRAMUSCULAR; INTRAVENOUS ONCE
Status: DISCONTINUED | OUTPATIENT
Start: 2020-02-13 | End: 2020-02-13

## 2020-02-13 RX ORDER — LEVOTHYROXINE SODIUM 175 UG/1
175 TABLET ORAL DAILY
COMMUNITY

## 2020-02-13 RX ORDER — SODIUM CHLORIDE 0.9 % (FLUSH) 0.9 %
10 SYRINGE (ML) INJECTION PRN
Status: DISCONTINUED | OUTPATIENT
Start: 2020-02-13 | End: 2020-02-17 | Stop reason: HOSPADM

## 2020-02-13 RX ORDER — AMLODIPINE BESYLATE 5 MG/1
5 TABLET ORAL DAILY
COMMUNITY

## 2020-02-13 RX ORDER — UREA 10 %
1 LOTION (ML) TOPICAL NIGHTLY PRN
COMMUNITY

## 2020-02-13 RX ORDER — ALLOPURINOL 100 MG/1
100 TABLET ORAL DAILY
Status: DISCONTINUED | OUTPATIENT
Start: 2020-02-14 | End: 2020-02-17 | Stop reason: HOSPADM

## 2020-02-13 RX ORDER — ACETAMINOPHEN 325 MG/1
650 TABLET ORAL EVERY 4 HOURS PRN
Status: DISCONTINUED | OUTPATIENT
Start: 2020-02-13 | End: 2020-02-17 | Stop reason: HOSPADM

## 2020-02-13 RX ORDER — ONDANSETRON 2 MG/ML
4 INJECTION INTRAMUSCULAR; INTRAVENOUS EVERY 6 HOURS PRN
Status: DISCONTINUED | OUTPATIENT
Start: 2020-02-13 | End: 2020-02-17 | Stop reason: HOSPADM

## 2020-02-13 RX ORDER — POTASSIUM CHLORIDE 7.45 MG/ML
10 INJECTION INTRAVENOUS PRN
Status: DISCONTINUED | OUTPATIENT
Start: 2020-02-13 | End: 2020-02-17 | Stop reason: HOSPADM

## 2020-02-13 RX ORDER — DOCUSATE SODIUM 100 MG/1
100 CAPSULE, LIQUID FILLED ORAL 2 TIMES DAILY
Status: DISCONTINUED | OUTPATIENT
Start: 2020-02-13 | End: 2020-02-17 | Stop reason: HOSPADM

## 2020-02-13 RX ORDER — SODIUM CHLORIDE 9 MG/ML
INJECTION, SOLUTION INTRAVENOUS CONTINUOUS
Status: DISCONTINUED | OUTPATIENT
Start: 2020-02-13 | End: 2020-02-14

## 2020-02-13 RX ORDER — LISINOPRIL 10 MG/1
10 TABLET ORAL DAILY
Status: DISCONTINUED | OUTPATIENT
Start: 2020-02-14 | End: 2020-02-17 | Stop reason: HOSPADM

## 2020-02-13 RX ORDER — ACETAMINOPHEN 325 MG/1
650 TABLET ORAL ONCE
Status: COMPLETED | OUTPATIENT
Start: 2020-02-13 | End: 2020-02-13

## 2020-02-13 RX ORDER — METOPROLOL TARTRATE 100 MG/1
100 TABLET ORAL 2 TIMES DAILY
Status: DISCONTINUED | OUTPATIENT
Start: 2020-02-13 | End: 2020-02-17 | Stop reason: HOSPADM

## 2020-02-13 RX ORDER — ROSUVASTATIN CALCIUM 10 MG/1
5 TABLET, COATED ORAL DAILY
Status: DISCONTINUED | OUTPATIENT
Start: 2020-02-14 | End: 2020-02-17 | Stop reason: HOSPADM

## 2020-02-13 RX ORDER — METOLAZONE 5 MG/1
5 TABLET ORAL DAILY
Status: ON HOLD | COMMUNITY
End: 2021-12-11 | Stop reason: SDUPTHER

## 2020-02-13 RX ORDER — AMLODIPINE BESYLATE 5 MG/1
5 TABLET ORAL DAILY
Status: DISCONTINUED | OUTPATIENT
Start: 2020-02-14 | End: 2020-02-17 | Stop reason: HOSPADM

## 2020-02-13 RX ORDER — SODIUM CHLORIDE 0.9 % (FLUSH) 0.9 %
10 SYRINGE (ML) INJECTION EVERY 12 HOURS SCHEDULED
Status: DISCONTINUED | OUTPATIENT
Start: 2020-02-13 | End: 2020-02-17 | Stop reason: HOSPADM

## 2020-02-13 RX ORDER — VITAMIN E 268 MG
400 CAPSULE ORAL NIGHTLY
COMMUNITY

## 2020-02-13 RX ADMIN — DOCUSATE SODIUM 100 MG: 100 CAPSULE, LIQUID FILLED ORAL at 20:16

## 2020-02-13 RX ADMIN — SODIUM CHLORIDE: 9 INJECTION, SOLUTION INTRAVENOUS at 19:25

## 2020-02-13 RX ADMIN — ACETAMINOPHEN 650 MG: 325 TABLET ORAL at 22:13

## 2020-02-13 RX ADMIN — ACETAMINOPHEN 650 MG: 325 TABLET ORAL at 12:36

## 2020-02-13 RX ADMIN — CITALOPRAM 20 MG: 20 TABLET, FILM COATED ORAL at 22:13

## 2020-02-13 RX ADMIN — FAMOTIDINE 20 MG: 20 TABLET ORAL at 22:13

## 2020-02-13 RX ADMIN — Medication 10 ML: at 20:16

## 2020-02-13 ASSESSMENT — ENCOUNTER SYMPTOMS
COUGH: 0
COLOR CHANGE: 0
VOMITING: 0
COLOR CHANGE: 1
WHEEZING: 0
SORE THROAT: 0
BACK PAIN: 0
CONSTIPATION: 0
CHEST TIGHTNESS: 0
SHORTNESS OF BREATH: 1
PHOTOPHOBIA: 0
TROUBLE SWALLOWING: 0
DIARRHEA: 0
NAUSEA: 0
SHORTNESS OF BREATH: 0
ABDOMINAL PAIN: 0

## 2020-02-13 ASSESSMENT — PAIN SCALES - GENERAL
PAINLEVEL_OUTOF10: 0
PAINLEVEL_OUTOF10: 2
PAINLEVEL_OUTOF10: 4

## 2020-02-13 NOTE — H&P
standing  []Assault    Abuse Reported []Yes []No  []Gunshot  []Stabbing  []Work Related  []Burn: []Flame []Scald []Electrical []Chemical []Contact []Inhalation []House Fire  []Other:   Patient Active Problem List   Diagnosis    Headache    Hypertension, malignant    Nausea & vomiting    Other vascular headache    Pyrexia    Acute infection of nasal sinus    E-coli UTI    Asymptomatic PVCs    Left lower lobe pneumonia (HCC)    Shortness of breath    Peripheral edema    Benign essential HTN    Acute frontal sinusitis    Ascending cholangitis    Bilateral atelectasis    TYRON (acute kidney injury) ([address]    SVT (supraventricular tachycardia) (HCC)    Calculus of gallbladder without cholecystitis without obstruction    Choledocholithiasis    S/p cardiac radiofrequency ablation    Influenza, pneumonia    Influenza with respiratory manifestation other than pneumonia    Ingrown nail    New onset atrial fibrillation ([address]    A-fib ([address]    CKD (chronic kidney disease), stage III ([address]     Subjective   Chief Complaint: Intraventricular hemorrhage    History of Present Illness: 78year old female patient who reports a PMH of HTN, CAD, A fib, HLD, CKD, on eliquis and aspirin presents to the emergency department for evaluation after a fall which occurred today in her home. Patient states she has chronic right-sided weakness from a stroke which occurred approx 50 years ago. Patient uses a walker to ambulate and wears a right leg brace. Pt states over the past 3 weeks she has been feeling more weak on her right side, which has lead to 4 falls in the past 3 weeks. Patient describes these falls as mechanical and related to weakness. Patient denies any syncope or episodes of dizziness or chest pain preceding the falls. Patient states for each of these prior falls she had been able to get up with the assistance of her , until today when they had to call EMS to help get her up.  Patient states she struck transfusion     Hyperlipidemia     Hypertension     Movement disorder     Pneumonia     Thyroid disease      Past Surgical History:   Procedure Laterality Date    CHOLECYSTECTOMY, LAPAROSCOPIC  04/24/2017    COLONOSCOPY      EKG 12-LEAD  8/26/2015         HYSTERECTOMY      JOINT REPLACEMENT      bilat knee replacement     Social History     Socioeconomic History    Marital status:      Spouse name: Mcihael Cabral Number of children: 5    Years of education: None    Highest education level: None   Occupational History    None   Social Needs    Financial resource strain: None    Food insecurity:     Worry: None     Inability: None    Transportation needs:     Medical: None     Non-medical: None   Tobacco Use    Smoking status: Never Smoker    Smokeless tobacco: Never Used   Substance and Sexual Activity    Alcohol use: No    Drug use: No    Sexual activity: Not Currently   Lifestyle    Physical activity:     Days per week: None     Minutes per session: None    Stress: None   Relationships    Social connections:     Talks on phone: None     Gets together: None     Attends [employer] service: None     Active member of club or organization: None     Attends meetings of clubs or organizations: None     Relationship status: None    Intimate partner violence:     Fear of current or ex partner: None     Emotionally abused: None     Physically abused: None     Forced sexual activity: None   Other Topics Concern    None   Social History Narrative    None     Family History   Problem Relation Age of Onset    Other Mother     Heart Disease Father        Home medications:    Previous Medications    ACETAMINOPHEN 650 MG TABS    Take 650 mg by mouth every 4 hours as needed    ALLOPURINOL (ZYLOPRIM) 100 MG TABLET    Take 100 mg by mouth daily    AMLODIPINE (NORVASC) 5 MG TABLET    Take 0.5 tablets by mouth daily    APIXABAN (ELIQUIS) 5 MG TABS TABLET    Take 1 tablet by mouth 2 times daily    ASPIRIN 81 MG EC TABLET    Take 81 mg by mouth 2 times daily    BENAZEPRIL (LOTENSIN) 10 MG TABLET    Take 1 tablet by mouth daily    BUMETANIDE (BUMEX) 2 MG TABLET    Take 1 tablet by mouth 2 times daily    CITALOPRAM (CELEXA) 20 MG TABLET    Take 20 mg by mouth daily    DOCUSATE SODIUM (COLACE, DULCOLAX) 100 MG CAPS    Take 100 mg by mouth 2 times daily as needed     FAMOTIDINE (PEPCID) 20 MG TABLET    Take 1 tablet by mouth nightly    FOLIC ACID (FOLVITE) 1 MG TABLET    Take 1 mg by mouth daily    HYDRALAZINE (APRESOLINE) 50 MG TABLET    Take 1 tablet by mouth every 8 hours Hold sbp less than 130    IPRATROPIUM-ALBUTEROL (DUONEB) 0.5-2.5 (3) MG/3ML SOLN NEBULIZER SOLUTION    Inhale 3 mLs into the lungs every 4 hours (while awake) for 5 days    LEVOTHYROXINE (SYNTHROID) 200 MCG TABLET    Take 175 mcg by mouth Daily     LISINOPRIL (PRINIVIL;ZESTRIL) 10 MG TABLET    Take 10 mg by mouth daily    METOPROLOL (LOPRESSOR) 100 MG TABLET    Take 100 mg by mouth daily     MICONAZOLE (MICOTIN) 2 % POWDER    Apply topically 2 times daily.     OXYBUTYNIN (DITROPAN) 5 MG TABLET    Take one tablet by mouth every other day alternating with two tablets by mouth every other day    POTASSIUM CHLORIDE (KLOR-CON M) 20 MEQ EXTENDED RELEASE TABLET    Take 20 mEq by mouth daily    PRAVASTATIN (PRAVACHOL) 40 MG TABLET    Take 40 mg by mouth daily    ROSUVASTATIN (CRESTOR) 5 MG TABLET    Take 5 mg by mouth daily       Hospital medications:  Scheduled Meds:  Continuous Infusions:  PRN Meds:  Objective   ED TRIAGE VITALS  BP: (!) 144/93, Temp: 98.5 °F (36.9 °C), Pulse: 64, Resp: 17, SpO2: 98 %  Rebecca Coma Scale  Eye Opening: Spontaneous  Best Verbal Response: Oriented  Best Motor Response: Obeys commands  Rebecca Coma Scale Score: 15  Results for orders placed or performed during the hospital encounter of 02/13/20   CBC auto differential   Result Value Ref Range    WBC 10.3 4.8 - 10.8 thou/mm3    RBC 3.76 (L) 4.20 - 5.40 mill/mm3    Hemoglobin 12.4 12.0 - 16.0 gm/dl    Hematocrit 39.4 37.0 - 47.0 %    .8 (H) 81.0 - 99.0 fL    MCH 33.0 26.0 - 33.0 pg    MCHC 31.5 (L) 32.2 - 35.5 gm/dl    RDW-CV 15.0 (H) 11.5 - 14.5 %    RDW-SD 57.8 (H) 35.0 - 45.0 fL    Platelets 622 813 - 157 thou/mm3    MPV 10.3 9.4 - 12.4 fL    Seg Neutrophils 79.2 %    Lymphocytes 10.3 %    Monocytes 7.8 %    Eosinophils 1.5 %    Basophils 0.4 %    Immature Granulocytes 0.8 %    Segs Absolute 8.2 (H) 1.8 - 7.7 thou/mm3    Lymphocytes Absolute 1.1 1.0 - 4.8 thou/mm3    Monocytes Absolute 0.8 0.4 - 1.3 thou/mm3    Eosinophils Absolute 0.2 0.0 - 0.4 thou/mm3    Basophils Absolute 0.0 0.0 - 0.1 thou/mm3    Immature Grans (Abs) 0.08 (H) 0.00 - 0.07 thou/mm3    nRBC 0 /100 wbc   Comprehensive Metabolic Panel   Result Value Ref Range    Glucose 107 70 - 108 mg/dL    CREATININE 1.0 0.4 - 1.2 mg/dL    BUN 34 (H) 7 - 22 mg/dL    Sodium 143 135 - 145 meq/L    Potassium 4.4 3.5 - 5.2 meq/L    Chloride 104 98 - 111 meq/L    CO2 29 23 - 33 meq/L    Calcium 9.4 8.5 - 10.5 mg/dL    AST 19 5 - 40 U/L    Alkaline Phosphatase 89 38 - 126 U/L    Total Protein 6.8 6.1 - 8.0 g/dL    Alb 4.2 3.5 - 5.1 g/dL    Total Bilirubin 0.6 0.3 - 1.2 mg/dL    ALT 23 11 - 66 U/L   APTT   Result Value Ref Range    aPTT 35.7 22.0 - 38.0 seconds   Protime-INR   Result Value Ref Range    INR 1.23 (H) 0.85 - 1.13   Brain Natriuretic Peptide   Result Value Ref Range    Pro-BNP 2701.0 (H) 0.0 - 1800.0 pg/mL   Troponin   Result Value Ref Range    Troponin T 0.012 (A) ng/ml   Anion Gap   Result Value Ref Range    Anion Gap 10.0 8.0 - 16.0 meq/L   Osmolality   Result Value Ref Range    Osmolality Calc 293.1 275.0 - 300 mOsmol/kg   Glomerular Filtration Rate, Estimated   Result Value Ref Range    Est, Glom Filt Rate 53 (A) ml/min/1.73m2   EKG Fall   Result Value Ref Range    Ventricular Rate 68 BPM    Atrial Rate 64 BPM    QRS Duration 84 ms    Q-T Interval 388 ms    QTc Calculation (Bazett) 412 ms    R Axis -69 degrees    T Axis 48 degrees       Physical Exam:  Patient Vitals for the past 24 hrs:   BP Temp Temp src Pulse Resp SpO2 Height Weight   02/13/20 1425 (!) 144/93 -- -- 64 17 98 % -- --   02/13/20 1410 (!) 158/92 -- -- 70 16 99 % -- --   02/13/20 1345 (!) 153/94 -- -- 69 17 97 % -- --   02/13/20 1202 131/80 98.5 °F (36.9 °C) Oral 67 17 96 % 5' 3\" (1.6 m) 243 lb (110.2 kg)     Primary Assessment:  Airway: Patent, trachea midline  Breathing: Breath sounds present and equal bilaterally, spontaneous, and unlabored  Circulation: Hemodynamically stable, 2+ central and peripheral pulses  Disability: LOPEZ x 4, following commands. GCS =15    Secondary Assessment:  General: Alert, NAD, pleasant and cooperative  Head: Small superficial abrasion noted to left frontal scalp. Normocephalic, mid face stable Tympanic membranes intact. Nares patent bilaterally, no epistaxis. Mouth clear of foreign bodies, no lacerations or abrasions  Eyes: PERRL. EOMI  Neurologic: A & O x3. Following commands. CN 2-12 grossly intact  Neck: Trachea midline. Cervical spines NTTP midline, without step-offs, crepitus or deformity. Tenderness with palpation of the paraspinal muscles of the cervical spine and upper back  Back:TL spines are NTTP midline, without step-offs, crepitus or deformity. No abrasions, contusions, or ecchymosis noted. Lungs: Clear to auscultation bilaterally. Chest Wall: Chest rise symmetrical.  Chest wall without tenderness to palpation. No crepitus, deformities, lacerations, or abrasions. Ecchymosis noted to the left lateral chest wall  Heart: RRR. Normal S1/S2. No obvious M/G/R. Abdomen:  Soft, NTTP. No guarding. Non-peritoneal  Pelvis:  NTTP, stable to compression. Femoral pulses 2+  Extremities: No gross deformities. PMS intact. Radial /DP/PT pulses 2+ bilaterally. Ecchymosis noted to the right shin  Skin: Skin warm and dry. Normal for ethnicity    Radiology:     CT Head WO Contrast   Final Result      1.  Small amount of layering last 3 weeks. Patient lost balance fell hit her left forehead with some bruising. Some dependent blood in the lateral ventricle. Neurologic status is stable. Care coordinated with Tariq Canela PA-C. Agree as documented. Hold antiplatelet, blood thinning medications. Neurosurgery consulted. PT OT repeat head CT in a.m.

## 2020-02-13 NOTE — ED NOTES
Pt normally wears 2L NC at this time pt is currently on 3L NC at 98%.      Carmen Arcos RN  02/13/20 4823

## 2020-02-13 NOTE — CONSULTS
trouble swallowing. Respiratory: Negative for chest tightness. Cardiovascular: Negative for chest pain. Gastrointestinal: Negative for abdominal pain. Genitourinary: Negative for difficulty urinating. Musculoskeletal: Negative for back pain. Skin: Positive for color change and wound. Neurological: Positive for weakness. Negative for numbness and headaches. Psychiatric/Behavioral: Negative for confusion. PROBLEM LIST:  Patient Active Problem List   Diagnosis    Headache    Hypertension, malignant    Nausea & vomiting    Other vascular headache    Pyrexia    Acute infection of nasal sinus    E-coli UTI    Asymptomatic PVCs    Left lower lobe pneumonia (HCC)    Shortness of breath    Peripheral edema    Benign essential HTN    Acute frontal sinusitis    Ascending cholangitis    Bilateral atelectasis    TYRON (acute kidney injury) (HCC)    SV[facility]) [facility]    Calculus of gallbladder without cholecystitis without obstruction    Choledocholithiasis    S/p cardiac radiofrequency ablation    Influenza, pneumonia    Influenza with respiratory manifestation other than pneumonia    Ingrown nail    New onset atrial fibrillation (HCC)    A-fib ([facility]    CKD (chronic kidney disease), stage III ([facility] [facility]       MEDICATIONS:   Prior to Admission medications    Medication Sig Start Date End Date Taking?  Authorizing Provider   rosuvastatin (CRESTOR) 5 MG tablet Take 5 mg by mouth daily    Historical Provider, MD   lisinopril (PRINIVIL;ZESTRIL) 10 MG tablet Take 10 mg by mouth daily    Historical Provider, MD   famotidine (PEPCID) 20 MG tablet Take 1 tablet by mouth nightly 11/27/19   Thomas Perez DO   amLODIPine (NORVASC) 5 MG tablet Take 0.5 tablets by mouth daily 11/27/19   Thomas Perez DO   apixaban (ELIQUIS) 5 MG TABS tablet Take 1 tablet by mouth 2 times daily 11/18/18   Jose Luis Rizo MD   benazepril (LOTENSIN) 10 MG tablet Take 1 tablet by mouth 10 MG tablet Take 10 mg by mouth daily      famotidine (PEPCID) 20 MG tablet Take 1 tablet by mouth nightly 30 tablet 5    amLODIPine (NORVASC) 5 MG tablet Take 0.5 tablets by mouth daily 30 tablet 3    apixaban (ELIQUIS) 5 MG TABS tablet Take 1 tablet by mouth 2 times daily 60 tablet 3    benazepril (LOTENSIN) 10 MG tablet Take 1 tablet by mouth daily 30 tablet 3    bumetanide (BUMEX) 2 MG tablet Take 1 tablet by mouth 2 times daily 30 tablet 3    metoprolol (LOPRESSOR) 100 MG tablet Take 100 mg by mouth daily       ipratropium-albuterol (DUONEB) 0.5-2.5 (3) MG/3ML SOLN nebulizer solution Inhale 3 mLs into the lungs every 4 hours (while awake) for 5 days 360 mL     miconazole (MICOTIN) 2 % powder Apply topically 2 times daily. 45 g 1    aspirin 81 MG EC tablet Take 81 mg by mouth 2 times daily      potassium chloride (KLOR-CON M) 20 MEQ extended release tablet Take 20 mEq by mouth daily      acetaminophen 650 MG TABS Take 650 mg by mouth every 4 hours as needed 120 tablet 3    hydrALAZINE (APRESOLINE) 50 MG tablet Take 1 tablet by mouth every 8 hours Hold sbp less than 130 90 tablet 0    docusate sodium (COLACE, DULCOLAX) 100 MG CAPS Take 100 mg by mouth 2 times daily as needed       folic acid (FOLVITE) 1 MG tablet Take 1 mg by mouth daily      oxybutynin (DITROPAN) 5 MG tablet Take one tablet by mouth every other day alternating with two tablets by mouth every other day      levothyroxine (SYNTHROID) 200 MCG tablet Take 175 mcg by mouth Daily       pravastatin (PRAVACHOL) 40 MG tablet Take 40 mg by mouth daily      allopurinol (ZYLOPRIM) 100 MG tablet Take 100 mg by mouth daily      citalopram (CELEXA) 20 MG tablet Take 20 mg by mouth daily                    ALLERGIES:   Patient has no known allergies.     PAST MEDICAL  HISTORY:    has a past medical history of Arthritis, CAD (coronary artery disease), Cerebral artery occlusion with cerebral infarction ([facility] Chronic kidney disease, GERD (gastroesophageal reflux disease), History of blood transfusion, Hyperlipidemia, Hypertension, Movement disorder, Pneumonia, and Thyroid disease. PAST SURGICAL  HISTORY:    has a past surgical history that includes joint replacement; Colonoscopy; EKG 12 Lead (8/26/2015); Cholecystectomy, laparoscopic (04/24/2017); and Hysterectomy. SOCIAL HISTORY:   Social History     Tobacco Use    Smoking status: Never Smoker    Smokeless tobacco: Never Used   Substance Use Topics    Alcohol use: No       FAMILY HISTORY:  Family History   Problem Relation Age of Onset    Other Mother     Heart Disease Father        LABS  CBC:   Lab Results   Component Value Date    WBC 10.3 02/13/2020    RBC 3.76 02/13/2020    HGB 12.4 02/13/2020    HCT 39.4 02/13/2020    .8 02/13/2020    MCH 33.0 02/13/2020    MCHC 31.5 02/13/2020    RDW 12.9 03/27/2018     02/13/2020    MPV 10.3 02/13/2020     Platelets:    Lab Results   Component Value Date     02/13/2020     PT/INR:    Lab Results   Component Value Date    INR 1.23 02/13/2020     PTT:    Lab Results   Component Value Date    APTT 35.7 02/13/2020   [APTT}    RADIOLOGY:  Pertinent images have been reviewed. Edda Kowalski CT showed:      EXAMINATION:    Has several brausing in her face and arms. Patient awake alert and oriented x3  GCS: 15/15   Pupils: equal and reactive   FCx4 with good strength in the left ( the right side especially the leg week 2/5 which is chronic findings). Sensory: grossly intact  Reflexes: 1+ through out. Planter reflex: up response in the right.      Chano Stauffer MD  Electronically signed 2/13/2020

## 2020-02-13 NOTE — ED PROVIDER NOTES
her pulse is 70. Her respiration is 16 and oxygen saturation is 99%. Physical Exam  Vitals signs and nursing note reviewed. Constitutional:       General: She is not in acute distress. Appearance: Normal appearance. She is well-developed. She is not ill-appearing, toxic-appearing or diaphoretic. HENT:      Head: Normocephalic. Abrasion and contusion present. No raccoon eyes, Ross's sign, masses or laceration. Jaw: There is normal jaw occlusion. No trismus, tenderness, swelling, pain on movement or malocclusion. Comments: There is a small hematoma with superficial abrasions of the left frontal scalp without associated tenderness. There was no additional edema, bruising, abrasion, or laceration noted of the face or scalp. There was no tenderness or crepitus of the face or scalp. There were no palpable bony or soft tissue abnormalities of the face or scalp. Right Ear: External ear normal.      Left Ear: External ear normal.      Nose: Nose normal. No nasal deformity, signs of injury, laceration or nasal tenderness. Mouth/Throat:      Mouth: Mucous membranes are moist. No lacerations or oral lesions. Pharynx: Oropharynx is clear. Eyes:      General: No scleral icterus. Right eye: No discharge. Left eye: No discharge. Conjunctiva/sclera: Conjunctivae normal.      Pupils: Pupils are equal, round, and reactive to light. Neck:      Musculoskeletal: Normal range of motion and neck supple. Normal range of motion. No edema, erythema, neck rigidity, crepitus, injury, pain with movement, torticollis, spinous process tenderness or muscular tenderness. Cardiovascular:      Rate and Rhythm: Normal rate and regular rhythm. Heart sounds: Normal heart sounds. No murmur. No friction rub. No gallop. Pulmonary:      Effort: Pulmonary effort is normal. No respiratory distress. Breath sounds: Normal breath sounds. No stridor. No wheezing, rhonchi or rales. Comments: There is no chest wall tenderness, edema, bruising, or crepitus. Lung sounds are clear and equal bilaterally. Chest:      Chest wall: No tenderness. Abdominal:      General: Bowel sounds are normal. There is no distension. Palpations: Abdomen is soft. There is no mass. Tenderness: There is no abdominal tenderness. There is no guarding or rebound. Hernia: No hernia is present. Comments: There is no abdominal tenderness, bruising, edema, distension, or crepitus. Musculoskeletal: Normal range of motion. General: No swelling, tenderness or deformity. Right hip: Normal. She exhibits normal range of motion, normal strength, no tenderness, no swelling, no crepitus and no deformity. Left hip: Normal. She exhibits normal range of motion, normal strength, no tenderness, no swelling, no crepitus and no deformity. Cervical back: Normal. She exhibits normal range of motion, no tenderness, no swelling, no deformity, no pain and no spasm. Thoracic back: Normal. She exhibits normal range of motion, no tenderness, no swelling, no deformity, no pain and no spasm. Lumbar back: Normal. She exhibits normal range of motion, no tenderness, no swelling, no deformity, no pain and no spasm. Comments: There is no midline spinal or paraspinal tenderness of the cervical, thoracic, or lumbar spine. There is good spinal ROM. I do not appreciate any bruising of the back on exam. Patient has good ROM and strength of the extremities but does have noted chronic weakness of the right upper and lower extremities secondary to a remote CVA, but the patient reports that this is unchanged from baseline. She has bruising of the dorsum of the right hand without associated tenderness or edema which her family members report is from a previous fall.   The patient also has mild bruising and mild tenderness of the anterior right lower leg which is also reportedly from a previous fall and is not new. There is intact sensation of soft touch in the extremities. The extremities appear to be neurovascularly intact. Lymphadenopathy:      Cervical: No cervical adenopathy. Skin:     General: Skin is warm and dry. Coloration: Skin is not pale. Findings: Bruising present. No erythema or rash. Neurological:      General: No focal deficit present. Mental Status: She is alert and oriented to person, place, and time. Mental status is at baseline. GCS: GCS eye subscore is 4. GCS verbal subscore is 5. GCS motor subscore is 6. Cranial Nerves: No cranial nerve deficit. Sensory: No sensory deficit. Motor: No abnormal muscle tone. Coordination: Coordination normal.      Comments: There were no noted cranial nerve or focal neurologic deficits. Cranial nerves II through XII are grossly intact. Psychiatric:         Behavior: Behavior normal.         Thought Content: Thought content normal.          NIH Stroke Scale  Interval: Baseline  Level of Consciousness (1a. ): Alert  LOC Questions (1b. ): Answers both correctly  LOC Commands (1c. ): Performs both tasks correctly  Best Gaze (2. ): Normal  Visual (3. ): No visual loss  Facial Palsy (4. ): Normal symmetrical movement  Motor Arm, Left (5a. ): No drift  Motor Arm, Right (5b. ): No drift  Motor Leg, Left (6a. ): No drift  Motor Leg, Right (6b. ): No effort against gravity(Due to hx of stroke )  Limb Ataxia (7. ): Absent  Sensory (8. ): Normal  Best Language (9. ): No aphasia  Dysarthria (10. ): Normal  Extinction and Inattention (11): No abnormality  Total: 3    DIFFERENTIAL DIAGNOSIS:   Includes but not limited to: Mechanical fall, contusion, abrasion, hematoma, TBI, concussion, ICH, paraspinal strain vs sprain vs paraspinal muscle spasm, herniated disc, sciatica, degenerative disc disease.  Low suspicion for: vertebral subluxation or fracture, cauda equina, discitis, epidural abscess, central canal compromise, or AAA based on history and physical exam.    DIAGNOSTIC RESULTS     EKG: All EKG's are interpreted by the Emergency Department Physician who either signs or Co-signsthis chart in the absence of a cardiologist.  EKG Fall (Preliminary result)    Component (Lab Inquiry)   Collection Time Result Time Ventricular Rate Atrial Rate QRS Duration Q-T Interval QTc Calculation (Bazett)   02/13/20 12:38:36 02/13/20 12:40:36 68 64 84 388 412       Collection Time Result Time R Axis T Axis   02/13/20 12:38:36 02/13/20 12:40:36 -69 48         Preliminary result                Narrative:    Atrial fibrillation  Left axis deviation  Anteroseptal infarct (cited on or before 18-NOV-2002)  Abnormal ECG  When compared with ECG of 15-NOV-2018 06:16,  The axis Shifted left              RADIOLOGY: non-plain film images(s) such as CT, Ultrasound and MRI are read by the radiologist.  Christen Lingo images are visualized and preliminarily interpreted by the emergency physician unless otherwise stated below. CT Head WO Contrast   Final Result      1. Small amount of layering blood in the posterior horns of the lateral ventricles. 2. Encephalomalacia high left frontoparietal lobe with area of coarse calcification and then mixed high attenuation which could reflect calcification or minimal hemorrhage. Calcification is favored. MRI could be performed if clinically warranted. These results were communicated directly with Ceci Valle on 2/13/2020 1:46 PM,  [ ]          **This report has been created using voice recognition software. It may contain minor errors which are inherent in voice recognition technology. **      Final report electronically signed by Dr. Nik Jeffrey on 2/13/2020 1:49 PM      CT Cervical Spine WO Contrast   Final Result    Degenerative changes. No acute fracture. **This report has been created using voice recognition software.  It may contain minor errors which are inherent in voice recognition technology. **      Final report electronically signed by Dr. Lalito Rossi on 2/13/2020 1:53 PM      XR CHEST STANDARD (2 VW)   Final Result      Cardiomegaly. Prominent interstitium. Possible small left pleural effusion. Correlate for interstitial edema/CHF         **This report has been created using voice recognition software. It may contain minor errors which are inherent in voice recognition technology. **      Final report electronically signed by Dr. Lalito Rossi on 2/13/2020 1:21 PM      XR PELVIS (1-2 VIEWS)   Final Result   No acute findings. **This report has been created using voice recognition software. It may contain minor errors which are inherent in voice recognition technology. **      Final report electronically signed by Dr. Lalito Rossi on 2/13/2020 1:23 PM          LABS:   Labs Reviewed   CBC WITH AUTO DIFFERENTIAL - Abnormal; Notable for the following components:       Result Value    RBC 3.76 (*)     .8 (*)     MCHC 31.5 (*)     RDW-CV 15.0 (*)     RDW-SD 57.8 (*)     Segs Absolute 8.2 (*)     Immature Grans (Abs) 0.08 (*)     All other components within normal limits   COMPREHENSIVE METABOLIC PANEL - Abnormal; Notable for the following components:    BUN 34 (*)     All other components within normal limits   PROTIME-INR - Abnormal; Notable for the following components:    INR 1.23 (*)     All other components within normal limits   BRAIN NATRIURETIC PEPTIDE - Abnormal; Notable for the following components:    Pro-BNP 2701.0 (*)     All other components within normal limits   TROPONIN - Abnormal; Notable for the following components:    Troponin T 0.012 (*)     All other components within normal limits   GLOMERULAR FILTRATION RATE, ESTIMATED - Abnormal; Notable for the following components:    Est, Glom Filt Rate 53 (*)     All other components within normal limits   APTT   ANION GAP   OSMOLALITY       EMERGENCY DEPARTMENT COURSE:   Vitals:    Vitals:    02/13/20 1202 02/13/20 department revealed cardiomegaly. Prominent interstitium. Possible small left pleural effusion. Correlate for interstitial edema/CHF. XR of the pelvis revealed no acute fracture, dislocation, or soft tissue abnormality. Head CT revealed a small amount of layering blood in the posterior horns of the lateral ventricles. Encephalomalacia in the high left frontoparietal lobe with an area of coarse calcification and then mixed high attenuation which could reflect calcification or minimal hemorrhage. Calcification is favored. C-spine CT revealed no acute fracture, dislocation, or soft tissue abnormality. Laboratory work revealed an initial troponin of 0.012. BNP is 2701, which is less than her past draw. INR is 1.23. Within the department, the patient was treated with Tylenol. I observed the patient's condition to remain stable during the duration of the stay. On reexam, the patient continued to be mentating at baseline. She continues to deny chest pain or abdominal pain. There continues to be no chest or abdominal tenderness or bruising. I explained my proposed course of treatment to the patient and her family members at bedside, who were amenable to my treatment and admission decisions. Dr. Annie Colon, Trauma Surgery, was consulted and kindly agreed to admit the patient for further evaluation and management. The patient remained stable and maintained stable vital signs until admission to the floor. CRITICAL CARE:   None    CONSULTS:  Discussed the case with my attending physician in the Emergency Department, Dr. Haydee Nicole, who agreed with my workup, treatment, and disposition decisions. Dr. Annie Colon, Trauma Surgery - kindly agreed to admit the patient for further evaluation and management    Dr. Mike Pena, Neurosurgery - Does not believe that any acute intervention is needed at this time. Advises repeat imaging tomorrow and kindly agreed to consult on this patient during her stay.     PROCEDURES:  None    FINAL

## 2020-02-13 NOTE — ED NOTES
Pt resting in bed, respirations easy and unlabored. Call light in reach. Family at bedside.      Stephen Monson RN  02/13/20 0829

## 2020-02-13 NOTE — CONSULTS
without assistance or with assistance from her  but today was unable to get up after fall and EMS was called. Patient does state that she hit her forehead on the ground at the time of fall but denies any loss of consciousness. She did have noted mild neck discomfort in ED CT cervical spine was negative for acute fracture. CT head did reveal an interventricular hemorrhage and neurosurgery was consulted. No plans for intervention at this time. Patient was admitted to the ICU. Past Medical History: Per HPI  Family History: Father: Heart disease  Social History: Lifetime non-smoker, denies alcohol use, denies drug use. Review of Systems   Constitutional: Negative for fatigue and fever. HENT: Negative for sore throat and trouble swallowing. Eyes: Negative for photophobia and visual disturbance. Respiratory: Negative for chest tightness, shortness of breath and wheezing. Cardiovascular: Positive for leg swelling. Negative for chest pain. Gastrointestinal: Negative for abdominal pain, nausea and vomiting. Endocrine: Negative for polydipsia and polyphagia. Genitourinary: Negative for flank pain and hematuria. Musculoskeletal: Negative for back pain and neck pain. Skin: Negative for color change, pallor and rash. Allergic/Immunologic: Negative for food allergies and immunocompromised state. Neurological: Positive for weakness (Right). Negative for dizziness and light-headedness. Hematological: Negative for adenopathy. Psychiatric/Behavioral: Negative for agitation and confusion. The patient is not nervous/anxious. Scheduled Meds:   sodium chloride flush  10 mL Intravenous 2 times per day    docusate sodium  100 mg Oral BID     Continuous Infusions:   sodium chloride         PHYSICAL EXAMINATION:  T:  98.6. P: 74. RR: 20. B/P: 147/74. FiO2: 2 O2 Sat: 99.  I/O: 0  Body mass index is 41.47 kg/m².      General:   Acute on chronically ill-appearing white female  HEENT: normocephalic and atraumatic. No scleral icterus. PERR  Neck: supple. No Thyromegaly. Lungs: clear to auscultation. No retractions  Cardiac: RRR. No JVD. Abdomen: soft. Nontender. Extremities:  No clubbing, cyanosis, or edema x 4. Vasculature: capillary refill < 3 seconds. Palpable dorsalis pedis pulses. Skin:  warm and dry. Psych:  Alert and oriented x3. Affect appropriate  Lymph:  No supraclavicular adenopathy. Neurologic:  No focal deficit. No seizures. Data: (All radiographs, tracings, PFTs, and imaging are personally viewed and interpreted unless otherwise noted).  Sodium 143 potassium 4.4, chloride 104, CO2 29, BUN 34, creatinine 1.0, anion gap 10.0.   WBC 10.3, H/H 12.4/39.4, platelets 594.  Hest x-ray 2/13/2020 reports cardiomegaly, small left pleural effusion.  CT head 2/13/2020 reports small amount of layering blood in the posterior horns of the lateral ventricles   CT cervical spine 2/13/2020 reports degenerative changes, with no acute fracture. Case discussed with Dr. Redd [facility] trauma service, Dr. Angela Valentin Continued ICU Level Care Criteria:    [x] Yes   [] No - Transfer Planned to listed location:  [] HOSPITALIST CONTACTED-          Electronically signed by Vicky Zamora. TARAN Levin - CNP  CRITICAL CARE SPECIALIST  Patient seen by me. Case discussed with Dr. Bhargav Stephenson. Telemetry shows atrial fibrillation with CVR. Pulse is irregular. Patient is pleasant. Keep SBP < 160. Electronically signed by Esdras Betancourt MD.

## 2020-02-14 ENCOUNTER — APPOINTMENT (OUTPATIENT)
Dept: CT IMAGING | Age: 80
DRG: 085 | End: 2020-02-14
Payer: MEDICARE

## 2020-02-14 LAB
ANION GAP SERPL CALCULATED.3IONS-SCNC: 10 MEQ/L (ref 8–16)
BASOPHILS # BLD: 0.4 %
BASOPHILS ABSOLUTE: 0 THOU/MM3 (ref 0–0.1)
BUN BLDV-MCNC: 28 MG/DL (ref 7–22)
CALCIUM SERPL-MCNC: 8.9 MG/DL (ref 8.5–10.5)
CHLORIDE BLD-SCNC: 106 MEQ/L (ref 98–111)
CO2: 24 MEQ/L (ref 23–33)
CREAT SERPL-MCNC: 0.9 MG/DL (ref 0.4–1.2)
EKG ATRIAL RATE: 326 BPM
EKG ATRIAL RATE: 64 BPM
EKG Q-T INTERVAL: 388 MS
EKG Q-T INTERVAL: 392 MS
EKG QRS DURATION: 76 MS
EKG QRS DURATION: 84 MS
EKG QTC CALCULATION (BAZETT): 394 MS
EKG QTC CALCULATION (BAZETT): 412 MS
EKG R AXIS: -69 DEGREES
EKG R AXIS: 38 DEGREES
EKG T AXIS: 48 DEGREES
EKG T AXIS: 55 DEGREES
EKG VENTRICULAR RATE: 61 BPM
EKG VENTRICULAR RATE: 68 BPM
EOSINOPHIL # BLD: 1.9 %
EOSINOPHILS ABSOLUTE: 0.1 THOU/MM3 (ref 0–0.4)
ERYTHROCYTE [DISTWIDTH] IN BLOOD BY AUTOMATED COUNT: 14.6 % (ref 11.5–14.5)
ERYTHROCYTE [DISTWIDTH] IN BLOOD BY AUTOMATED COUNT: 55.2 FL (ref 35–45)
GFR SERPL CREATININE-BSD FRML MDRD: 60 ML/MIN/1.73M2
GLUCOSE BLD-MCNC: 97 MG/DL (ref 70–108)
HCT VFR BLD CALC: 34.1 % (ref 37–47)
HEMOGLOBIN: 10.6 GM/DL (ref 12–16)
IMMATURE GRANS (ABS): 0.06 THOU/MM3 (ref 0–0.07)
IMMATURE GRANULOCYTES: 0.8 %
LYMPHOCYTES # BLD: 19.8 %
LYMPHOCYTES ABSOLUTE: 1.5 THOU/MM3 (ref 1–4.8)
MCH RBC QN AUTO: 32.1 PG (ref 26–33)
MCHC RBC AUTO-ENTMCNC: 31.1 GM/DL (ref 32.2–35.5)
MCV RBC AUTO: 103.3 FL (ref 81–99)
MONOCYTES # BLD: 10.1 %
MONOCYTES ABSOLUTE: 0.8 THOU/MM3 (ref 0.4–1.3)
NUCLEATED RED BLOOD CELLS: 0 /100 WBC
PLATELET # BLD: 149 THOU/MM3 (ref 130–400)
PMV BLD AUTO: 10.3 FL (ref 9.4–12.4)
POTASSIUM REFLEX MAGNESIUM: 4.1 MEQ/L (ref 3.5–5.2)
RBC # BLD: 3.3 MILL/MM3 (ref 4.2–5.4)
SEG NEUTROPHILS: 67 %
SEGMENTED NEUTROPHILS ABSOLUTE COUNT: 5.2 THOU/MM3 (ref 1.8–7.7)
SODIUM BLD-SCNC: 140 MEQ/L (ref 135–145)
WBC # BLD: 7.8 THOU/MM3 (ref 4.8–10.8)

## 2020-02-14 PROCEDURE — APPSS180 APP SPLIT SHARED TIME > 60 MINUTES: Performed by: NURSE PRACTITIONER

## 2020-02-14 PROCEDURE — 99232 SBSQ HOSP IP/OBS MODERATE 35: CPT | Performed by: INTERNAL MEDICINE

## 2020-02-14 PROCEDURE — 97530 THERAPEUTIC ACTIVITIES: CPT

## 2020-02-14 PROCEDURE — 94640 AIRWAY INHALATION TREATMENT: CPT

## 2020-02-14 PROCEDURE — 97162 PT EVAL MOD COMPLEX 30 MIN: CPT

## 2020-02-14 PROCEDURE — 94761 N-INVAS EAR/PLS OXIMETRY MLT: CPT

## 2020-02-14 PROCEDURE — 6370000000 HC RX 637 (ALT 250 FOR IP): Performed by: NURSE PRACTITIONER

## 2020-02-14 PROCEDURE — 6370000000 HC RX 637 (ALT 250 FOR IP): Performed by: PHYSICIAN ASSISTANT

## 2020-02-14 PROCEDURE — 99233 SBSQ HOSP IP/OBS HIGH 50: CPT | Performed by: SURGERY

## 2020-02-14 PROCEDURE — 2700000000 HC OXYGEN THERAPY PER DAY

## 2020-02-14 PROCEDURE — 97535 SELF CARE MNGMENT TRAINING: CPT

## 2020-02-14 PROCEDURE — 92523 SPEECH SOUND LANG COMPREHEN: CPT

## 2020-02-14 PROCEDURE — 93010 ELECTROCARDIOGRAM REPORT: CPT | Performed by: INTERNAL MEDICINE

## 2020-02-14 PROCEDURE — 97167 OT EVAL HIGH COMPLEX 60 MIN: CPT

## 2020-02-14 PROCEDURE — 2709999900 HC NON-CHARGEABLE SUPPLY

## 2020-02-14 PROCEDURE — 36415 COLL VENOUS BLD VENIPUNCTURE: CPT

## 2020-02-14 PROCEDURE — 2580000003 HC RX 258: Performed by: PHYSICIAN ASSISTANT

## 2020-02-14 PROCEDURE — 80048 BASIC METABOLIC PNL TOTAL CA: CPT

## 2020-02-14 PROCEDURE — 85025 COMPLETE CBC W/AUTO DIFF WBC: CPT

## 2020-02-14 PROCEDURE — 97110 THERAPEUTIC EXERCISES: CPT

## 2020-02-14 PROCEDURE — 2060000000 HC ICU INTERMEDIATE R&B

## 2020-02-14 PROCEDURE — 70450 CT HEAD/BRAIN W/O DYE: CPT

## 2020-02-14 PROCEDURE — APPSS60 APP SPLIT SHARED TIME 46-60 MINUTES: Performed by: NURSE PRACTITIONER

## 2020-02-14 PROCEDURE — 99232 SBSQ HOSP IP/OBS MODERATE 35: CPT | Performed by: NEUROLOGICAL SURGERY

## 2020-02-14 RX ORDER — VITAMIN E 268 MG
400 CAPSULE ORAL NIGHTLY
Status: DISCONTINUED | OUTPATIENT
Start: 2020-02-14 | End: 2020-02-17 | Stop reason: HOSPADM

## 2020-02-14 RX ORDER — FOLIC ACID 1 MG/1
1 TABLET ORAL DAILY
Status: DISCONTINUED | OUTPATIENT
Start: 2020-02-14 | End: 2020-02-17 | Stop reason: HOSPADM

## 2020-02-14 RX ORDER — OXYBUTYNIN CHLORIDE 5 MG/1
5 TABLET ORAL DAILY
Status: DISCONTINUED | OUTPATIENT
Start: 2020-02-14 | End: 2020-02-17 | Stop reason: HOSPADM

## 2020-02-14 RX ORDER — LANOLIN ALCOHOL/MO/W.PET/CERES
3 CREAM (GRAM) TOPICAL NIGHTLY PRN
Status: DISCONTINUED | OUTPATIENT
Start: 2020-02-14 | End: 2020-02-17 | Stop reason: HOSPADM

## 2020-02-14 RX ORDER — POTASSIUM CHLORIDE 20 MEQ/1
20 TABLET, EXTENDED RELEASE ORAL DAILY
Status: DISCONTINUED | OUTPATIENT
Start: 2020-02-14 | End: 2020-02-17 | Stop reason: HOSPADM

## 2020-02-14 RX ORDER — IPRATROPIUM BROMIDE AND ALBUTEROL SULFATE 2.5; .5 MG/3ML; MG/3ML
3 SOLUTION RESPIRATORY (INHALATION)
Status: DISCONTINUED | OUTPATIENT
Start: 2020-02-14 | End: 2020-02-15

## 2020-02-14 RX ADMIN — METOPROLOL TARTRATE 100 MG: 100 TABLET, FILM COATED ORAL at 20:38

## 2020-02-14 RX ADMIN — Medication 400 UNITS: at 20:38

## 2020-02-14 RX ADMIN — LEVOTHYROXINE SODIUM 175 MCG: 150 TABLET ORAL at 10:04

## 2020-02-14 RX ADMIN — ROSUVASTATIN CALCIUM 5 MG: 10 TABLET, FILM COATED ORAL at 10:04

## 2020-02-14 RX ADMIN — OXYBUTYNIN CHLORIDE 5 MG: 5 TABLET ORAL at 12:34

## 2020-02-14 RX ADMIN — FOLIC ACID 1 MG: 1 TABLET ORAL at 12:34

## 2020-02-14 RX ADMIN — Medication 10 ML: at 20:38

## 2020-02-14 RX ADMIN — METOPROLOL TARTRATE 100 MG: 100 TABLET, FILM COATED ORAL at 12:34

## 2020-02-14 RX ADMIN — CITALOPRAM 20 MG: 20 TABLET, FILM COATED ORAL at 20:38

## 2020-02-14 RX ADMIN — LISINOPRIL 10 MG: 10 TABLET ORAL at 10:05

## 2020-02-14 RX ADMIN — METOLAZONE 5 MG: 5 TABLET ORAL at 10:05

## 2020-02-14 RX ADMIN — Medication 10 ML: at 20:45

## 2020-02-14 RX ADMIN — Medication 10 ML: at 10:08

## 2020-02-14 RX ADMIN — ALLOPURINOL 100 MG: 100 TABLET ORAL at 10:03

## 2020-02-14 RX ADMIN — IPRATROPIUM BROMIDE AND ALBUTEROL SULFATE 3 ML: .5; 3 SOLUTION RESPIRATORY (INHALATION) at 12:22

## 2020-02-14 RX ADMIN — Medication 3 MG: at 01:05

## 2020-02-14 RX ADMIN — AMLODIPINE BESYLATE 5 MG: 5 TABLET ORAL at 10:04

## 2020-02-14 RX ADMIN — ACETAMINOPHEN 650 MG: 325 TABLET ORAL at 20:38

## 2020-02-14 RX ADMIN — IPRATROPIUM BROMIDE AND ALBUTEROL SULFATE 3 ML: .5; 3 SOLUTION RESPIRATORY (INHALATION) at 19:48

## 2020-02-14 RX ADMIN — DOCUSATE SODIUM 100 MG: 100 CAPSULE, LIQUID FILLED ORAL at 20:38

## 2020-02-14 RX ADMIN — POTASSIUM CHLORIDE 20 MEQ: 1500 TABLET, EXTENDED RELEASE ORAL at 10:04

## 2020-02-14 RX ADMIN — FAMOTIDINE 20 MG: 20 TABLET ORAL at 20:38

## 2020-02-14 RX ADMIN — Medication 3 MG: at 22:09

## 2020-02-14 RX ADMIN — DOCUSATE SODIUM 100 MG: 100 CAPSULE, LIQUID FILLED ORAL at 10:04

## 2020-02-14 RX ADMIN — IPRATROPIUM BROMIDE AND ALBUTEROL SULFATE 3 ML: .5; 3 SOLUTION RESPIRATORY (INHALATION) at 15:43

## 2020-02-14 ASSESSMENT — ENCOUNTER SYMPTOMS
BACK PAIN: 0
NAUSEA: 0
WHEEZING: 0
VOMITING: 0
COUGH: 0
ABDOMINAL PAIN: 0
CHEST TIGHTNESS: 0
TROUBLE SWALLOWING: 0
PHOTOPHOBIA: 0
COLOR CHANGE: 0
SORE THROAT: 0

## 2020-02-14 ASSESSMENT — PAIN DESCRIPTION - ONSET: ONSET: UNABLE TO TELL

## 2020-02-14 ASSESSMENT — PAIN DESCRIPTION - PAIN TYPE: TYPE: ACUTE PAIN

## 2020-02-14 ASSESSMENT — PAIN DESCRIPTION - ORIENTATION: ORIENTATION: MID

## 2020-02-14 ASSESSMENT — PAIN DESCRIPTION - LOCATION: LOCATION: HEAD

## 2020-02-14 ASSESSMENT — PAIN DESCRIPTION - DESCRIPTORS: DESCRIPTORS: ACHING

## 2020-02-14 ASSESSMENT — PAIN SCALES - GENERAL
PAINLEVEL_OUTOF10: 4
PAINLEVEL_OUTOF10: 0
PAINLEVEL_OUTOF10: 0
PAINLEVEL_OUTOF10: 4

## 2020-02-14 ASSESSMENT — PAIN DESCRIPTION - PROGRESSION: CLINICAL_PROGRESSION: NOT CHANGED

## 2020-02-14 ASSESSMENT — PAIN DESCRIPTION - FREQUENCY: FREQUENCY: INTERMITTENT

## 2020-02-14 NOTE — DISCHARGE INSTR - COC
Continuity of Care Form    Patient Name: Ramón Garcia   :  1940  MRN:  [id number]    Admit date:  2020  Discharge date:  2020    Code Status Order: Full Code   Advance Directives:     Admitting Physician:  Milton Arango MD  PCP: Jatin Dey DO    Discharging Nurse: [address] Phone Number: [phone]    Emergency Contact:   Extended Emergency Contact Information  Primary Emergency Contact: Shayy Hutchison  Address: [address] [place] [address] Phone: [phone]  Relation: Spouse  Secondary Emergency Contact: Nguyen [address] Phone: [phone]  Relation: Child    Past Surgical History:  Past Surgical History:   Procedure Laterality Date    CHOLECYSTECTOMY, LAPAROSCOPIC  2017    COLONOSCOPY      EKG 12-LEAD  2015         HYSTERECTOMY      JOINT REPLACEMENT      bilat knee replacement       Immunization History: There is no immunization history on file for this patient.     Active Problems:  Patient Active Problem List   Diagnosis Code    Headache R51    Hypertension, malignant I10    Nausea & vomiting R11.2    Other vascular headache G44.1    Pyrexia R50.9    Acute infection of nasal sinus J01.90    E-coli UTI N39.0, B96.20    Asymptomatic PVCs I49.3    Left lower lobe pneumonia (HCC) J18.1    Shortness of breath R06.02    Peripheral edema R60.9    Benign essential HTN I10    Acute frontal sinusitis J01.10    Ascending cholangitis K83.09    Bilateral atelectasis J98.11    TYRON (acute kidney injury) ([facility]) N17.9    SVT (supraventricular tachycardia) ([facility]) I47.1    Calculus of gallbladder without cholecystitis without obstruction K80.20    Choledocholithiasis K80.50    S/p cardiac radiofrequency ablation Z98.890    Influenza, pneumonia [id number]    Influenza with respiratory manifestation other than pneumonia J11.1    Ingrown nail L60.0    New onset atrial fibrillation (HCC) I48.91    A-fib (HCC) I48.91    CKD (chronic kidney disease), stage III (HCC) N18.3    Intraventricular hemorrhage (HCC) I61.5    Fall W19. XXXA    Scalp abrasion S00. 01XA       Isolation/Infection:   Isolation          No Isolation        Patient Infection Status     None to display          Nurse Assessment:  Last Vital Signs: /61   Pulse 77   Temp 98.8 °F (37.1 °C) (Oral)   Resp 23   Ht 5' 3\" (1.6 m)   Wt 227 lb 4.7 oz (103.1 kg)   SpO2 95%   BMI 40.26 kg/m²     Last documented pain score (0-10 scale): Pain Level: 0  Last Weight:   Wt Readings from Last 1 Encounters:   02/14/20 227 lb 4.7 oz (103.1 kg)     Mental Status:  oriented and alert    IV Access:  - None    Nursing Mobility/ADLs:  Walking   Dependent  Transfer  Dependent  Bathing  Dependent  Dressing  Dependent  Toileting  Dependent  Feeding  Independent  Med Admin  Dependent  Med Delivery   whole    Wound Care Documentation and Therapy:  Wound 02/13/20 Head Left;Upper abrasion and bruising (Active)   Wound Traumatic 2/14/2020  8:05 AM   Dressing/Treatment Open to air 2/14/2020  8:05 AM   Wound Assessment Purple 2/14/2020  8:05 AM   Drainage Amount None 2/14/2020  8:05 AM   Number of days: 0       Wound 02/13/20 Coccyx blanches, purple (Active)   Wound Deep tissue/Injury 2/14/2020  8:05 AM   Dressing/Treatment Open to air 2/14/2020  8:05 AM   Post-Procedure Length (cm) 10 cm 2/13/2020  3:40 PM   Post-Procedure Width (cm) 7 cm 2/13/2020  3:40 PM   Post-Procedure Surface Area (cm^2) 70 cm^2 2/13/2020  3:40 PM   Wound Assessment Purple 2/14/2020  8:05 AM   Drainage Amount None 2/14/2020  8:05 AM   Number of days: 0        Elimination:  Continence:   · Bowel: No  · Bladder: No  Urinary Catheter: None   Colostomy/Ileostomy/Ileal Conduit: No       Date of Last BM: 2/17/2020    Intake/Output Summary (Last 24 hours) at 2/14/2020 1131  Last data filed at 2/14/2020 0505  Gross per necessary for the continuing treatment of the diagnosis listed and that she requires [place] for greater 30 days.      Update Admission H&P: No change in H&P    PHYSICIAN SIGNATURE:  Electronically signed by Ivan Blanca MD on 2/17/20 at 2:32 PM

## 2020-02-14 NOTE — CARE COORDINATION
20, 10:02 AM  DISCHARGE PLANNING EVALUATION:    Timur Frey       Admitted from: ED 2020/  [facility] day: 1   Location: -15/015-A Reason for admit: Intraventricular hemorrhage ([address] [I61.5] Status: IP  Admit order signed?: yes  PMH:  has a past medical history of Arthritis, CAD (coronary artery disease), Cerebral artery occlusion with cerebral infarction ([address] Chronic kidney disease, GERD (gastroesophageal reflux disease), History of blood transfusion, Hyperlipidemia, Hypertension, Movement disorder, Pneumonia, and Thyroid disease. Procedure: none  Pertinent abnormal Imagin/13 XR Pelvis: No acute findings   CXR: Cardiomegaly. Prominent interstitium. Possible small left pleural effusion. Correlate for interstitial edema/CHF   CT Head: Small amount of layering blood in the posterior horns of the lateral ventricles; Encephalomalacia high left frontoparietal lobe with area of coarse calcification and then mixed high attenuation which could reflect calcification or minimal hemorrhage. Calcification is favored   CT Cervical spine: No acute fracture   CT head: Redemonstration of mild intraventricular hemorrhage is noted dependently bilaterally within the posterior horn of the lateral ventricles; There is an area of encephalomalacia demonstrated within the left frontal parietal region near the left apex with an area of adjacent left parasagittal low-attenuation located just cephalad to the left lateral ventricle. This also involves the frontoparietal region and contains multifocal areas of hyperdensity. The anteriormost area of hyperdensity appears to represent calcification. However the more posterior areas of hyperdensity demonstrated on axial image 24 may represent calcifications vs hemorrhage.   Medications:  Scheduled Meds:   folic acid  1 mg Oral Daily    ipratropium-albuterol  3 mL Inhalation Q4H WA    oxybutynin  5 mg Oral Daily    potassium chloride  20 mEq Oral Daily    vitamin E  400 Units Oral Nightly    sodium chloride flush  10 mL Intravenous 2 times per day    docusate sodium  100 mg Oral BID    amLODIPine  5 mg Oral Daily    allopurinol  100 mg Oral Daily    citalopram  20 mg Oral Nightly    famotidine  20 mg Oral Nightly    levothyroxine  175 mcg Oral Daily    lisinopril  10 mg Oral Daily    metOLazone  5 mg Oral Daily    metoprolol  100 mg Oral BID    rosuvastatin  5 mg Oral Daily     Continuous Infusions:   Pertinent Info/Orders/Treatment Plan: Presented after fall at home; has had 4 falls in past 3 weeks d/t right sided weakness from stroke 50 years ago. Reports she struck forehead on ground, no LOC, mild neck discomfort, and some SOB. CT revealed intraventricular hemorrhage; Neurosurgery consulted. Intensivist consulted. Afebrile. Afib 60's. Sats 90% on 2L O2. Ox4. NIH 6. External urinary catheter. SLP/PT/OT. Allopurinol, norvasc, celexa, pepcid, folic acid, nebs, synthroid, lisinopril, zaroxolyn, lopressor, ditropan, K+, crestor. Pro-bnp 2701, trop 0.012, hgb 12.4 - now 10.6, INR 1.23. Diet: DIET GENERAL;   Smoking status:  reports that she has never smoked. She has never used smokeless tobacco.   PCP: Sree Herr DO  Readmission 30 days or less: no  Readmission Risk Score: 15%    Discharge Planning Evaluation  Current Residence:     Living Arrangements:      Support Systems:     Current Services PTA:     Potential Assistance Needed:     Potential Assistance Purchasing Medications:     Does patient want to participate in local refill/ meds to beds program?     Type of Home Care Services:     Patient expects to be discharged to:     Expected Discharge date: Follow Up Appointment: Best Day/ Time:      Patient Goals/Plan/Treatment Preferences: Spoke with Vivien Tariq and her . She uses a walker and wears 2L O2 ATC. She did not have any  services PTA. She does not drive; she uses [employer] for transportation to appointments.  Her

## 2020-02-14 NOTE — PROGRESS NOTES
Juan M Sutherland  Daily Progress Note      Pt Name: Timur Frey  Medical Record Number: [id number]  Date of Birth 1940   Today's Date: 2/14/2020    HD: # 1    CC: No complaints    ASSESSMENT  1. Active Hospital Problems    Diagnosis Date Noted    Intraventricular hemorrhage ([facility] [address] [I61.5] 02/13/2020    Fall [W19. XXXA] 02/13/2020    Scalp abrasion [S00.01XA] 02/13/2020         PLAN  Patient admitted under Trauma Services to ICU   - Transfer out to  today if OK with consultants     Fall from standing              -Patient has had 4 falls in 3 weeks              -Right sided weakness at baseline d/t prior stroke              -PT/OT              -Consider medicine consult for workup of falls     Intraventricular hemorrhage              -CT in the ED shows small amount of layering blood in the posterior horns of the lateral ventricles   - repeat head CT this AM shows improvement              -Neurosurgery managing nonoperatively              -Neuro checks              -Pain control     Scalp abrasion              -Local wound care     Pain Management              -Tylenol     Prophylaxis: SCD's, Incentive Spirometry, Colace, Zofran     General diet   Stop IVF Management  Regular Neuro Checks  Repeat Labs Tomorrow AM  PT/OT/SLP Eval and Treat   Up with assistance     Planned Discharge pending clinical course   - Will likely need placement. Patient has chosen [facility] of [address] [place] [address], Case management and  on board         SUBJECTIVE  Pt doing well. Adequate analgesia. she is tolerating a general diet. Pt tolerating bedrest - to advance activity today with therapies. Pt is passing flatus but has not had a bowel movement. Pt denies any nausea of vomiting.       Wt Readings from Last 3 Encounters:   02/14/20 227 lb 4.7 oz (103.1 kg)   11/27/19 229 lb (103.9 kg)   11/18/18 221 lb 11.2 oz (100.6 kg)     Temp Readings from Last 3 Encounters:   02/14/20 98.8 °F

## 2020-02-14 NOTE — PROGRESS NOTES
CRITICAL CARE PROGRESS NOTE      Patient:  [facility]    Unit/Bed:4D-15/015-A  YOB: 1940  MRN: [id number]   PCP: Romero Kiser DO  Date of Admission: 2/13/2020  Chief Complaint:-intracranial hemorrhage    Assessment and Plan:    1. Intercranial hemorrhage: Neurosurgery following, holding aspirin and Eliquis, appreciate neurosurgery recommendation on restarting   2. Acute on chronic respiratory failure: History of COPD, patient wears 2 L at home, patient is at baseline now  3. Mechanical fall: States she uses walker, experienced mechanical fall due to weakness, PT OT consulted. Plans for nursing home at discharge   4. Hypertension: Resume home hypertensive medications. 5. Atrial fibrillation: Chronic, holding Eliquis secondary to intracranial hemorrhage. Patient neurosurgery is recommendations on restarting  6. Hyperlipidemia: Crestor 5 mg nightly  7. GERD: Protonix  8. CKD: Renal function at baseline  9. History of CVA: Right-sided weakness secondary to CVA  10. CAD: Holding daily aspirin secondary to #1  11. Chronic diastolic heart failure: Resume home Bumex  12. Hypothyroidism: Resume home Synthroid  13. Anxiety: Home Celexa    INITIAL H AND P AND ICU COURSE:  Merlin Acosta is an 64-year-old white female who presented to [facility] on 2/13/2020 after suffering a fall at home. She has a past medical history of hypertension, CAD, atrial fib, hyperlipidemia, chronic kidney disease, GERD, CVA, diastolic heart failure, anxiety. Per report patient states that she has chronic right-sided weakness from a previous of stroke that occurred more than 50 years ago. She does use a walker at home to ambulate and wears a leg brace on her right leg. Per report patient was falling over the last 3 weeks with a total of 4 falls. Patient described these as mechanical falls and that they were related to weakness.   Patient denies any syncope dizziness chest pain prior to falling. Patient states that most of these fall she is able to get up without assistance or with assistance from her  but today was unable to get up after fall and EMS was called. Patient does state that she hit her forehead on the ground at the time of fall but denies any loss of consciousness. She did have noted mild neck discomfort in ED CT cervical spine was negative for acute fracture. CT head did reveal an interventricular hemorrhage and neurosurgery was consulted. No plans for intervention at this time. Patient was admitted to the ICU. 2/14 orders to transfer to floor today, patient CT is stable. Plans for nursing home at discharge. Transferring to [facility], hospitalist does not need to follow patient per trauma service. Past Medical History:  Per HPI  Family History:  Father: Heart disease  Social History:  Lifetime non-smoker, denies alcohol use, denies drug use. Review of Systems   Constitutional: Negative for fatigue and fever. HENT: Negative for sore throat and trouble swallowing. Eyes: Negative for photophobia and visual disturbance. Respiratory: Negative for cough, chest tightness and wheezing. Cardiovascular: Positive for leg swelling (chronic). Negative for chest pain. Gastrointestinal: Negative for abdominal pain, nausea and vomiting. Endocrine: Negative for polydipsia and polyphagia. Genitourinary: Negative for flank pain and hematuria. Musculoskeletal: Negative for back pain and neck pain. Skin: Negative for color change, pallor and rash. Allergic/Immunologic: Negative for food allergies and immunocompromised state. Neurological: Positive for weakness (right chronic). Negative for dizziness and numbness. Hematological: Negative for adenopathy. Psychiatric/Behavioral: Negative for agitation and confusion. The patient is not nervous/anxious.             Scheduled Meds:   folic acid  1 mg Oral Daily    ipratropium-albuterol  3 mL Inhalation Q4H WA    to listed location: 4A  [x] HOSPITALIST CONTACTED- does not need to follow after transfer from the ICU     This and plan discussed with Dr. Callie Segundo, Dr. Courtney Kenney, and Dr. Elbert Chapman. Electronically signed by Eloina Hu. TARAN Butcher - CNP  CRITICAL CARE SPECIALIST  Patient seen by me. No longer requires ICU management. Transition to medical floor. Electronically signed by Libby Ledesma Junior, MD.

## 2020-02-14 NOTE — CARE COORDINATION
2/14/20, 11:15 AM    DISCHARGE PLANNING EVALUATION    Order received for placement. SW spoke with pt,  and son, requesting [facility] [address] for inpt rehab when medically ready. SW spoke with [facility] for referral. No precert needed. Waiting on [facility] to accept.

## 2020-02-14 NOTE — FLOWSHEET NOTE
Pt was with a family friend at the time of the visit. She was dealing with intraventricular hemorrhage. She asked for the sacrament of reconciliation and it was offered and was later anointed. 02/14/20 2849   Encounter Summary   Services provided to: Patient   Referral/Consult From: [facility] Friends/neighbors   Place of [employer]   Continue Visiting Yes  (2/14 )   Complexity of Encounter Moderate   Length of Encounter 15 minutes   Spiritual/Spiritism   Type Spiritual support   Assessment Approachable;Calm   Intervention Prayer;Nurtured hope; Active listening; Anointing;Reconciliation;Empowerment;Sustaining presence/ Ministry of presence   Outcome Connection/belonging;Expressed gratitude;Encouraged; Hopeful;Receptive   Sacraments   Sacrament of Sick-Anointing Anointed

## 2020-02-14 NOTE — PROGRESS NOTES
Neurosurgery Progress Note    Patient:  Luzmaria Mcnally      Unit/Bed:4D-15/015-A    YOB: 1940    MRN: [id number]     Acct: [de-identified]     Admit date: 2/13/2020    Chief Complaint   Patient presents with   Adrianna Ray       Patient Seen, Chart, Physician notes, Labs, Radiology studies reviewed. Subjective: Remains status post admission with findings of small intraventricular bleed on CT scanning image at admission. She remains without any neurological deficits and does not have any complaints on exam this morning. Past, Family, Social History unchanged from admission. Diet:  DIET GENERAL;    Medications:  Scheduled Meds:   folic acid  1 mg Oral Daily    ipratropium-albuterol  3 mL Inhalation Q4H WA    oxybutynin  5 mg Oral Daily    potassium chloride  20 mEq Oral Daily    vitamin E  400 Units Oral Nightly    sodium chloride flush  10 mL Intravenous 2 times per day    docusate sodium  100 mg Oral BID    amLODIPine  5 mg Oral Daily    allopurinol  100 mg Oral Daily    citalopram  20 mg Oral Nightly    famotidine  20 mg Oral Nightly    levothyroxine  175 mcg Oral Daily    lisinopril  10 mg Oral Daily    metOLazone  5 mg Oral Daily    metoprolol  100 mg Oral BID    rosuvastatin  5 mg Oral Daily     Continuous Infusions:  PRN Meds:melatonin, sodium chloride flush, acetaminophen, magnesium hydroxide, ondansetron, potassium chloride    Objective: Stable and grossly intact on exam without specific complaints. Vitals: /61   Pulse 77   Temp 98.8 °F (37.1 °C) (Oral)   Resp 23   Ht 5' 3\" (1.6 m)   Wt 227 lb 4.7 oz (103.1 kg)   SpO2 95%   BMI 40.26 kg/m²   Physical Exam:  Alert and attentive. Language appropriate, with no aphasia. Pupils equal.  Facial strength symmetric. Physical Exam   Change from admission. See HPI.    ROS:  Review of Systems  Constitutional: Negative for fever. HENT: Negative for trouble swallowing. Respiratory: Negative for chest tightness. characterization can be obtained with MRI. There is underlying mild to moderate diffuse atrophy. Patchy periventricular white matter hypoattenuation demonstrated possibly representing underlying moderate chronic small vessel ischemic change. There is a lacunar infarct demonstrated at the left the  basal ganglia which is unchanged from November 2009. The visualized paranasal sinuses demonstrates mild mucosal thickening within the posterior left ethmoid sinus region as well as opacification of the left sphenoid cellule. No acute abdomen bodies of the calvarium are seen. The globes and orbits appear intact. 1. Redemonstration of mild intraventricular hemorrhage is noted dependently bilaterally within the posterior horn of the lateral ventricles. 2. There is an area of encephalomalacia demonstrated within the left frontal parietal region near the left apex with an area of adjacent left parasagittal low-attenuation located just cephalad to the left lateral ventricle. This also involves the frontoparietal region and contains multifocal areas of hyperdensity. The anteriormost area of hyperdensity appears to represent calcification. However the more posterior areas of hyperdensity demonstrated on axial image 24 may represent calcifications versus hemorrhage. Further characterization can be obtained with MRI. **This report has been created using voice recognition software. It may contain minor errors which are inherent in voice recognition technology. ** Final report electronically signed by Dr. Armadno Clarke on 2/14/2020 7:36 AM    Ct Head Wo Contrast    Result Date: 2/13/2020  PROCEDURE: CT HEAD WO CONTRAST CLINICAL INFORMATION: fall, hit head, on Eliquis, Trauma. COMPARISON: August 24, 2015 TECHNIQUE: Noncontrast 5 mm axial images were obtained through the brain.  All CT scans at this facility use dose modulation, iterative reconstruction, and/or weight-based dosing when appropriate to reduce radiation dose to as low as reasonably achievable. FINDINGS: Generalized volume loss and small vessel ischemic changes. Encephalomalacia high left frontoparietal lobe with small area of coarse calcification. Additional curvilinear areas of higher attenuation is also thought to represent calcification however small  amount of hemorrhage cannot be excluded. Prominent ventricles. Small amount of layering blood in the posterior horns of the lateral ventricles. Old left basal ganglia infarction. Chronic opacification of the sphenoid and ethmoid sinuses. Mastoid air cells are patent. Skull: Unremarkable. Soft tissues small frontal scalp hematoma. 1. Small amount of layering blood in the posterior horns of the lateral ventricles. 2. Encephalomalacia high left frontoparietal lobe with area of coarse calcification and then mixed high attenuation which could reflect calcification or minimal hemorrhage. Calcification is favored. MRI could be performed if clinically warranted. These results were communicated directly with Evelin Pelletier on 2/13/2020 1:46 PM,  [ ] **This report has been created using voice recognition software. It may contain minor errors which are inherent in voice recognition technology. ** Final report electronically signed by Dr. Brenda Pinon on 2/13/2020 1:49 PM    Ct Cervical Spine Wo Contrast    Result Date: 2/13/2020  PROCEDURE: CT CERVICAL SPINE WO CONTRAST CLINICAL INFORMATION: fall, neck pain, Trauma. COMPARISON: No prior study. TECHNIQUE: 3 mm noncontrast axial images were obtained through the cervical spine with sagittal and coronal reconstructions. All CT scans at this facility use dose modulation, iterative reconstruction, and/or weight-based dosing when appropriate to reduce radiation dose to as low as reasonably achievable. FINDINGS: Opacified sphenoid sinuses. Mastoid air cells are clear. Facet hypertrophy and degenerative changes. Mild reversal of the normal cervical curvature. No acute fracture or subluxation.  Partial fusion C5-C6 and C7. Multilevel disc space narrowing. Prevertebral soft tissues are unremarkable. Degenerative changes. No acute fracture. **This report has been created using voice recognition software. It may contain minor errors which are inherent in voice recognition technology. ** Final report electronically signed by Dr. Sara Bhatt on 2/13/2020 1:53 PM    A/P: S/P patient remains status post admission with findings of small intraventricular bleed read as mild on CT scan performed today. Stable nature of her imaging findings in the absence of any acute changes recorded to her chart her condition overnight neurosurgery is recommending transfer from the ICU to the stepdown unit for continued observation with a new CT scan to be performed for review on Monday. Neurosurgery to follow    Electronically signed by Zi Edward PA-C on 2/14/2020 at 11:52 AM     Attending Note:     Patient seen and examined in ICU in conjunction with neurosurgery PA Zi Edward PA-C). Discussed with patient, her family her nurse, the trauma team and the ICU team as well. All data and imaging reviewed by myself. I agree with examination assessment and plan as documented above. -There is no acute event over the night.  -There is no change in patient neurological exam comparing with yesterday.  -Today brain CT showed stable exam.  -Still there is no indication for any acute neurosurgical intervention at this time.  -Patient can be transferred to the floor from neurosurgical perspective. -PT and OT.  -Inpatient rehab / TCU consultations for discharge plan.   -New brain CT on Monday.  -Neurosurgery will follow     Qi Gallo MD

## 2020-02-14 NOTE — PROGRESS NOTES
[address] THERAPY  EVALUATION  [facility] ICU 4D - 4D-15/015-A    Time In: 9258  Time Out: 1220  Timed Code Treatment Minutes: 10 Minutes  Minutes: 25          Date: 2020  Patient Name: Juve Spain,  Gender:  female        MRN: [id number]  : 1940  (78 y.o.)      Referring Practitioner: Newell Cooks, PA-C  Diagnosis: Intraventricular hemorrhage  Additional Pertinent Hx: Per H&P: presents to the emergency department for evaluation after a fall which occurred today in her home. Patient states she has chronic right-sided weakness from a stroke. Patient uses a walker to ambulate and wears a right leg brace. Pt states over the past 3 weeks she has been feeling more weak on her right side, which has lead to 4 falls in the past 3 weeks. CT head showed intraventricular hemorrhage-no neurosurgical intervention planned. Restrictions/Precautions:  Restrictions/Precautions: General Precautions, Fall Risk, Seizure  Required Braces or Orthoses  Right Lower Extremity Brace: Ankle Foot Orthotics  Position Activity Restriction  Other position/activity restrictions: h/o CVA with R sided residual weakness    Subjective:  Chart Reviewed: Yes  Patient assessed for rehabilitation services?: Yes  Subjective: Pt resting in bed and agrees to therapy. General:  Overall Orientation Status: Within Functional Limits         Hearing: Within functional limits         Pain:  Denies. Social/Functional History:    Lives With: Spouse  Type of Home: House  Home Layout: One level  Home Access: Ramped entrance  Home Equipment: Rolling walker, Oxygen, Lift chair(2L O2 24/7 which  reported pt had just started using within past few weeks; primarily fully elevates lift chair for transfers)             ADL Assistance: Needs assistance  Bath: Moderate assistance(for lower legs/feet; pt reported only completes sponge baths at home)  Dressing:  Moderate assistance(for LB; requires assist with socks, shoes AFO)  Grooming: Independent  Feeding: Independent  Toileting: Independent     Ambulation Assistance: Needs assistance  Transfer Assistance: Needs assistance          Additional Comments:  reported he has been assisting pt \"for a while\" with transfers and mobility. Pt with h/o frequent falls past few weeks, attributing to RLE weakness/\"giving out. \"     OBJECTIVE:  Range of Motion:  Right Lower Extremity: Impaired - limited due to prior CVA  Left Lower Extremity: [facility]    Strength:  Right Lower Extremity: Impaired - grossly 2/5 at hip and knee and PF; 0/5 at DF  Left Lower Extremity: Impaired - grossly 4-/5    Balance:  Static Sitting Balance:  Stand By Assistance  Dynamic Sitting Balance: Stand By Assistance  Static Standing Balance: Minimal Assistance, with walker  Dynamic Standing Balance: Minimal Assistance, to Mod A and use of walker for wt shifting within walker    Bed Mobility:  Rolling to Left: Maximum Assistance   Rolling to Right: Moderate Assistance   Supine to Sit: Maximum Assistance  Sit to Supine: Maximum Assistance     Transfers:  Sit to Stand: Moderate Assistance, X 1, with increased time for completion, when bed at typical height, but Min A x 1 with bed elevated to simulate lift chair  Stand to Sit:Minimal Assistance    Ambulation:  attempted to take steps but pt unable to unweight either LE to advance    Exercise:  Patient was guided in 1 set(s) 7-10 reps of exercise to both lower extremities. R LE required AAROM to complete each. Ankle pumps, Quad sets, Heelslides, Long arc quads and Hip abduction/adduction. Exercises were completed for increased independence with functional mobility. Functional Outcome Measures: Completed  AM-PAC Inpatient Mobility without Stair Climbing Raw Score : 8  AM-PAC Inpatient without Stair Climbing T-Scale Score : 30.65    ASSESSMENT:  Activity Tolerance:  Patient tolerance of  treatment: fair.  plus      Treatment Initiated: Treatment

## 2020-02-15 LAB — MRSA SCREEN: NORMAL

## 2020-02-15 PROCEDURE — 2580000003 HC RX 258: Performed by: PHYSICIAN ASSISTANT

## 2020-02-15 PROCEDURE — 6370000000 HC RX 637 (ALT 250 FOR IP): Performed by: NURSE PRACTITIONER

## 2020-02-15 PROCEDURE — APPSS30 APP SPLIT SHARED TIME 16-30 MINUTES: Performed by: PHYSICIAN ASSISTANT

## 2020-02-15 PROCEDURE — 2700000000 HC OXYGEN THERAPY PER DAY

## 2020-02-15 PROCEDURE — 6370000000 HC RX 637 (ALT 250 FOR IP): Performed by: PHYSICIAN ASSISTANT

## 2020-02-15 PROCEDURE — 99231 SBSQ HOSP IP/OBS SF/LOW 25: CPT | Performed by: PHYSICIAN ASSISTANT

## 2020-02-15 PROCEDURE — 2709999900 HC NON-CHARGEABLE SUPPLY

## 2020-02-15 PROCEDURE — 99232 SBSQ HOSP IP/OBS MODERATE 35: CPT | Performed by: SURGERY

## 2020-02-15 PROCEDURE — 94640 AIRWAY INHALATION TREATMENT: CPT

## 2020-02-15 PROCEDURE — 94760 N-INVAS EAR/PLS OXIMETRY 1: CPT

## 2020-02-15 PROCEDURE — 2060000000 HC ICU INTERMEDIATE R&B

## 2020-02-15 RX ORDER — IPRATROPIUM BROMIDE AND ALBUTEROL SULFATE 2.5; .5 MG/3ML; MG/3ML
3 SOLUTION RESPIRATORY (INHALATION) EVERY 4 HOURS PRN
Status: DISCONTINUED | OUTPATIENT
Start: 2020-02-15 | End: 2020-02-17 | Stop reason: HOSPADM

## 2020-02-15 RX ADMIN — DOCUSATE SODIUM 100 MG: 100 CAPSULE, LIQUID FILLED ORAL at 20:59

## 2020-02-15 RX ADMIN — AMLODIPINE BESYLATE 5 MG: 5 TABLET ORAL at 09:06

## 2020-02-15 RX ADMIN — Medication 400 UNITS: at 20:59

## 2020-02-15 RX ADMIN — ROSUVASTATIN CALCIUM 5 MG: 10 TABLET, FILM COATED ORAL at 09:05

## 2020-02-15 RX ADMIN — DOCUSATE SODIUM 100 MG: 100 CAPSULE, LIQUID FILLED ORAL at 09:05

## 2020-02-15 RX ADMIN — LEVOTHYROXINE SODIUM 175 MCG: 150 TABLET ORAL at 06:20

## 2020-02-15 RX ADMIN — Medication 10 ML: at 21:00

## 2020-02-15 RX ADMIN — METOPROLOL TARTRATE 100 MG: 100 TABLET, FILM COATED ORAL at 09:05

## 2020-02-15 RX ADMIN — CITALOPRAM 20 MG: 20 TABLET, FILM COATED ORAL at 20:59

## 2020-02-15 RX ADMIN — FOLIC ACID 1 MG: 1 TABLET ORAL at 09:04

## 2020-02-15 RX ADMIN — METOPROLOL TARTRATE 100 MG: 100 TABLET, FILM COATED ORAL at 20:59

## 2020-02-15 RX ADMIN — IPRATROPIUM BROMIDE AND ALBUTEROL SULFATE 3 ML: .5; 3 SOLUTION RESPIRATORY (INHALATION) at 10:53

## 2020-02-15 RX ADMIN — METOLAZONE 5 MG: 5 TABLET ORAL at 09:05

## 2020-02-15 RX ADMIN — Medication 10 ML: at 09:15

## 2020-02-15 RX ADMIN — POTASSIUM CHLORIDE 20 MEQ: 1500 TABLET, EXTENDED RELEASE ORAL at 09:05

## 2020-02-15 RX ADMIN — LISINOPRIL 10 MG: 10 TABLET ORAL at 09:05

## 2020-02-15 RX ADMIN — OXYBUTYNIN CHLORIDE 5 MG: 5 TABLET ORAL at 09:04

## 2020-02-15 RX ADMIN — ACETAMINOPHEN 650 MG: 325 TABLET ORAL at 20:57

## 2020-02-15 RX ADMIN — FAMOTIDINE 20 MG: 20 TABLET ORAL at 20:59

## 2020-02-15 RX ADMIN — ALLOPURINOL 100 MG: 100 TABLET ORAL at 09:05

## 2020-02-15 ASSESSMENT — PAIN SCALES - GENERAL
PAINLEVEL_OUTOF10: 0
PAINLEVEL_OUTOF10: 2
PAINLEVEL_OUTOF10: 0

## 2020-02-15 NOTE — PLAN OF CARE
Problem: Pain:  Goal: Pain level will decrease  Description  Pain level will decrease  Outcome: Met This Shift     Problem: Pain:  Goal: Control of chronic pain  Description  Control of chronic pain  Outcome: Met This Shift     Problem: COMMUNICATION IMPAIRMENT  Goal: Ability to express needs and understand communication  Outcome: Met This Shift     Problem: Bleeding:  Goal: Will show no signs and symptoms of excessive bleeding  Description  Will show no signs and symptoms of excessive bleeding  Outcome: Met This Shift     Problem: Falls - Risk of:  Goal: Will remain free from falls  Description  Will remain free from falls  Outcome: Ongoing  Note:   Patient at risk for falls due to decreased mobility and previous stroke. Fall assessment completed. Patient verbalizes understanding call light for needs this shift. Fall band in place on patient's arm. Fall signs posted. Bed alarm in place and functioning properly. Problem: Falls - Risk of:  Goal: Absence of physical injury  Description  Absence of physical injury  Outcome: Ongoing  Note:   Patient free from physical injury this shift. Problem: Risk for Impaired Skin Integrity  Goal: Tissue integrity - skin and mucous membranes  Description  Structural intactness and normal physiological function of skin and  mucous membranes. Outcome: Ongoing  Note:   Patient at risk for decreased skin integrity due to decreased mobility and previous stroke. Patient turned and repositioned every 2 hours and PRN t/o this shift. No new skin breakdown noted this shift. Problem: Pain:  Goal: Control of acute pain  Description  Control of acute pain  Outcome: Ongoing  Note:   PRN tylenol as needed. Using 0-10 pain scale. Patient able to communicate pain assessment at this time. Problem: HEMODYNAMIC STATUS  Goal: Patient has stable vital signs and fluid balance  Outcome: Ongoing  Note:   Patient remains hemodynamically stable at this time.       Problem: ACTIVITY INTOLERANCE/IMPAIRED MOBILITY  Goal: Mobility/activity is maintained at optimum level for patient  Outcome: Ongoing  Note:   Patient has right sided deficits from previous stroke. Uses walker and leg brace at home. Care plan reviewed with patient and family. Patient and family verbalize understanding of the plan of care and contribute to goal setting.

## 2020-02-15 NOTE — PROGRESS NOTES
[place] covering for Esdras Coffey  Daily Progress Note      Pt Name: Mata Jiménez  Medical Record Number: [id number]  Date of Birth 1940   Today's Date: 2/15/2020    HD: # 2    CC: No complaints    ASSESSMENT    Active Hospital Problems    Diagnosis Date Noted    Intraventricular hemorrhage ([facility] [address] [I61.5] 02/13/2020    Fall [E50. XXXA] 02/13/2020    Scalp abrasion [S00.01XA] 02/13/2020         PLAN  Patient admitted under Trauma Services to ICU   - Transfer out to  today      Fall from standing              -Patient has had 4 falls in 3 weeks              -Right sided weakness at baseline d/t prior stroke              -PT/OT     Intraventricular hemorrhage              -CT in the ED shows small amount of layering blood in the posterior horns of the lateral ventricles   - repeat head CT yesterday AM showed improvement              -Neurosurgery managing nonoperatively   -Repeat head CT Monday per neurosurgery              -Neuro checks              -Pain control     Scalp abrasion              -Local wound care     Pain Management              -Tylenol     Prophylaxis: SCD's, Incentive Spirometry, Colace, Zofran     General diet   Stop IVF Management  Regular Neuro Checks  Repeat Labs this AM-Stable  PT/OT/SLP Eval and Treat   Up with assistance     Planned Discharge pending clinical course   - Will likely need placement. Patient has chosen [facility], Case management and  on board         SUBJECTIVE  Pt doing well. Adequate analgesia. she is tolerating a general diet. Pt tolerating PT. Pt had BM last night. No major events overnight. She has no complaints. Neuro examination stable. Pt denies chest pain, SOB, fever, chills, N/V/D. Transfer to .       Wt Readings from Last 3 Encounters:   02/15/20 224 lb 3.3 oz (101.7 kg)   11/27/19 229 lb (103.9 kg)   11/18/18 221 lb 11.2 oz (100.6 kg)     Temp Readings from Last 3 Encounters: 02/15/20 98.8 °F (37.1 °C) (Oral)   11/18/18 98.1 °F (36.7 °C) (Axillary)   04/02/18 98.5 °F (36.9 °C) (Oral)     BP Readings from Last 3 Encounters:   02/15/20 (!) 151/82   11/27/19 105/73   11/18/18 119/69     Pulse Readings from Last 3 Encounters:   02/15/20 69   11/27/19 84   11/18/18 72       24 HR INTAKE/OUTPUT :     Intake/Output Summary (Last 24 hours) at 2/15/2020 0758  Last data filed at 2/15/2020 0400  Gross per 24 hour   Intake 640 ml   Output 700 ml   Net -60 ml   BM = 0      DIET GENERAL;    OBJECTIVE  CURRENT VITALS BP (!) 151/82   Pulse 69   Temp 98.8 °F (37.1 °C) (Oral)   Resp 25   Ht 5' 3\" (1.6 m)   Wt 224 lb 3.3 oz (101.7 kg)   SpO2 94%   BMI 39.72 kg/m²        GENERAL: alert, cooperative, no distress  NEURO: awake, alert and oriented. PERRL. Conversing fluently and appropriately   LUNGS: clear to auscultation bilaterally- no wheezes, rales or rhonchi, normal air movement, no respiratory distress  HEART: normal rate, normal S1 and S2, no gallops, intact distal pulses and no carotid bruits  ABDOMEN: Obese. Soft, non-tender, non-distended, normal bowel sounds, no masses or organomegaly  WOUNDS: Abrasion just above left eyebrow with no significant drainage, no significant erythema  EXTREMITY: no cyanosis, no clubbing and +1 bilateral lower extremity edema      LABS  CBC :   Recent Labs     02/13/20  1220 02/14/20  0343   WBC 10.3 7.8   HGB 12.4 10.6*   HCT 39.4 34.1*   .8* 103.3*    149     BMP:   Recent Labs     02/13/20  1220 02/14/20  0343    140   K 4.4 4.1    106   CO2 29 24   BUN 34* 28*   CREATININE 1.0 0.9     COAGS:   Recent Labs     02/13/20  1220   APTT 35.7   PROT 6.8   INR 1.23*     Pancreas/HFP:  No results for input(s): LIPASE, AMYLASE in the last 72 hours.   Recent Labs     02/13/20  1220   AST 19   ALT 23   BILITOT 0.6   ALKPHOS 80     RADIOLOGY:  Narrative   PROCEDURE: CT HEAD WO CONTRAST       CLINICAL INFORMATION: Intraventricular hemorrhage

## 2020-02-15 NOTE — PLAN OF CARE
Problem: Falls - Risk of:  Goal: Will remain free from falls  Description  Will remain free from falls  Outcome: Met This Shift  Note:   No fall this shift. Pt is alert, but very limited mobility. Frequent checks and hourly rounds to assess needs. BEd alarm on. Problem: Falls - Risk of:  Goal: Absence of physical injury  Description  Absence of physical injury  Outcome: Met This Shift     Problem: COMMUNICATION IMPAIRMENT  Goal: Ability to express needs and understand communication  Outcome: Met This Shift  Note:   Pt able to communicate needs well     Problem: Risk for Impaired Skin Integrity  Goal: Tissue integrity - skin and mucous membranes  Description  Structural intactness and normal physiological function of skin and  mucous membranes. Outcome: Ongoing  Note:   Turn and reposition every 2 hours and PRN. Keep heels and elbows elevated off bed. Watch for pressure from medical devices. Problem: Pain:  Goal: Pain level will decrease  Description  Pain level will decrease  Outcome: Ongoing  Note:   Denies pain at this time. Reassess and medicated if needed. Problem: Pain:  Goal: Control of acute pain  Description  Control of acute pain  Outcome: Ongoing     Problem: Pain:  Goal: Control of chronic pain  Description  Control of chronic pain  Outcome: Ongoing     Problem: HEMODYNAMIC STATUS  Goal: Patient has stable vital signs and fluid balance  Outcome: Ongoing  Note:   Vital sign stable. Continue to monitor     Problem: ACTIVITY INTOLERANCE/IMPAIRED MOBILITY  Goal: Mobility/activity is maintained at optimum level for patient  Outcome: Ongoing  Note:   PT/OT to continue working with patient. Very limited mobility     Problem: Bleeding:  Goal: Will show no signs and symptoms of excessive bleeding  Description  Will show no signs and symptoms of excessive bleeding  Outcome: Ongoing  Note:   No signs of new bleeding. Continue to monitor    Care plan reviewed with patient and family.   Patient and

## 2020-02-15 NOTE — PROGRESS NOTES
Cardiovascular: Negative for chest pain. Gastrointestinal: Negative for abdominal pain. Genitourinary: Negative for difficulty urinating. Musculoskeletal: Negative for back pain. Skin: Positive for color change and wound. Neurological: Positive for weakness. Negative for numbness and headaches. Psychiatric/Behavioral: Negative for confusion.     24 hour intake/output:    Intake/Output Summary (Last 24 hours) at 2/15/2020 1211  Last data filed at 2/15/2020 0400  Gross per 24 hour   Intake 240 ml   Output 700 ml   Net -460 ml     Last 3 weights: Wt Readings from Last 3 Encounters:   02/15/20 224 lb 3.3 oz (101.7 kg)   11/27/19 229 lb (103.9 kg)   11/18/18 221 lb 11.2 oz (100.6 kg)         CBC:   Recent Labs     02/13/20  1220 02/14/20  0343   WBC 10.3 7.8   HGB 12.4 10.6*    149     BMP:    Recent Labs     02/13/20  1220 02/14/20  0343    140   K 4.4 4.1    106   CO2 29 24   BUN 34* 28*   CREATININE 1.0 0.9   GLUCOSE 107 97     Calcium:  Recent Labs     02/14/20  0343   CALCIUM 8.9     Magnesium:No results for input(s): MG in the last 72 hours. Glucose:No results for input(s): POCGLU in the last 72 hours. HgbA1C: No results for input(s): LABA1C in the last 72 hours. Lipids: No results for input(s): CHOL, TRIG, HDL, LDLCALC in the last 72 hours. Invalid input(s): LDL    Radiology reports as per the Radiologist  Radiology: Xr Chest Standard (2 Vw)    Result Date: 2/13/2020  PROCEDURE: XR CHEST (2 VW) CLINICAL INFORMATION: fall. COMPARISON: November 14, 2018 TECHNIQUE: PA and lateral views the chest. FINDINGS: Cardiomegaly. Prominent interstitium. Possible small left pleural effusion. Correlate for interstitial edema/CHF. Ectatic thoracic aorta. No acute osseous findings. Cardiomegaly. Prominent interstitium. Possible small left pleural effusion. Correlate for interstitial edema/CHF **This report has been created using voice recognition software.  It may contain minor errors

## 2020-02-16 ENCOUNTER — APPOINTMENT (OUTPATIENT)
Dept: GENERAL RADIOLOGY | Age: 80
DRG: 085 | End: 2020-02-16
Payer: MEDICARE

## 2020-02-16 LAB
ANION GAP SERPL CALCULATED.3IONS-SCNC: 11 MEQ/L (ref 8–16)
BACTERIA: ABNORMAL /HPF
BILIRUBIN URINE: NEGATIVE
BLOOD, URINE: NEGATIVE
BUN BLDV-MCNC: 16 MG/DL (ref 7–22)
CALCIUM SERPL-MCNC: 9.2 MG/DL (ref 8.5–10.5)
CASTS 2: ABNORMAL /LPF
CASTS UA: ABNORMAL /LPF
CHARACTER, URINE: ABNORMAL
CHLORIDE BLD-SCNC: 103 MEQ/L (ref 98–111)
CO2: 26 MEQ/L (ref 23–33)
COLOR: YELLOW
CREAT SERPL-MCNC: 0.8 MG/DL (ref 0.4–1.2)
CRYSTALS, UA: ABNORMAL
EPITHELIAL CELLS, UA: ABNORMAL /HPF
ERYTHROCYTE [DISTWIDTH] IN BLOOD BY AUTOMATED COUNT: 14.3 % (ref 11.5–14.5)
ERYTHROCYTE [DISTWIDTH] IN BLOOD BY AUTOMATED COUNT: 52.5 FL (ref 35–45)
GFR SERPL CREATININE-BSD FRML MDRD: 69 ML/MIN/1.73M2
GLUCOSE BLD-MCNC: 109 MG/DL (ref 70–108)
GLUCOSE URINE: NEGATIVE MG/DL
HCT VFR BLD CALC: 37.7 % (ref 37–47)
HEMOGLOBIN: 12.1 GM/DL (ref 12–16)
KETONES, URINE: NEGATIVE
LEUKOCYTE ESTERASE, URINE: ABNORMAL
MAGNESIUM: 1.9 MG/DL (ref 1.6–2.4)
MCH RBC QN AUTO: 32.1 PG (ref 26–33)
MCHC RBC AUTO-ENTMCNC: 32.1 GM/DL (ref 32.2–35.5)
MCV RBC AUTO: 100 FL (ref 81–99)
MISCELLANEOUS 2: ABNORMAL
NITRITE, URINE: POSITIVE
PH UA: 6.5 (ref 5–9)
PLATELET # BLD: 161 THOU/MM3 (ref 130–400)
PMV BLD AUTO: 9.7 FL (ref 9.4–12.4)
POTASSIUM SERPL-SCNC: 4.2 MEQ/L (ref 3.5–5.2)
PROTEIN UA: NEGATIVE
RBC # BLD: 3.77 MILL/MM3 (ref 4.2–5.4)
RBC URINE: ABNORMAL /HPF
RENAL EPITHELIAL, UA: ABNORMAL
SODIUM BLD-SCNC: 140 MEQ/L (ref 135–145)
SPECIFIC GRAVITY, URINE: 1.01 (ref 1–1.03)
UROBILINOGEN, URINE: 1 EU/DL (ref 0–1)
WBC # BLD: 9.4 THOU/MM3 (ref 4.8–10.8)
WBC UA: ABNORMAL /HPF
YEAST: ABNORMAL

## 2020-02-16 PROCEDURE — 87186 SC STD MICRODIL/AGAR DIL: CPT

## 2020-02-16 PROCEDURE — 87086 URINE CULTURE/COLONY COUNT: CPT

## 2020-02-16 PROCEDURE — 87077 CULTURE AEROBIC IDENTIFY: CPT

## 2020-02-16 PROCEDURE — 83735 ASSAY OF MAGNESIUM: CPT

## 2020-02-16 PROCEDURE — 99222 1ST HOSP IP/OBS MODERATE 55: CPT | Performed by: NURSE PRACTITIONER

## 2020-02-16 PROCEDURE — 36415 COLL VENOUS BLD VENIPUNCTURE: CPT

## 2020-02-16 PROCEDURE — 81001 URINALYSIS AUTO W/SCOPE: CPT

## 2020-02-16 PROCEDURE — 2709999900 HC NON-CHARGEABLE SUPPLY

## 2020-02-16 PROCEDURE — 80048 BASIC METABOLIC PNL TOTAL CA: CPT

## 2020-02-16 PROCEDURE — 6370000000 HC RX 637 (ALT 250 FOR IP): Performed by: PHYSICIAN ASSISTANT

## 2020-02-16 PROCEDURE — 2060000000 HC ICU INTERMEDIATE R&B

## 2020-02-16 PROCEDURE — 2580000003 HC RX 258: Performed by: PHYSICIAN ASSISTANT

## 2020-02-16 PROCEDURE — 6370000000 HC RX 637 (ALT 250 FOR IP): Performed by: NURSE PRACTITIONER

## 2020-02-16 PROCEDURE — 71045 X-RAY EXAM CHEST 1 VIEW: CPT

## 2020-02-16 PROCEDURE — 85027 COMPLETE CBC AUTOMATED: CPT

## 2020-02-16 PROCEDURE — 99232 SBSQ HOSP IP/OBS MODERATE 35: CPT | Performed by: SURGERY

## 2020-02-16 RX ADMIN — ROSUVASTATIN CALCIUM 5 MG: 10 TABLET, FILM COATED ORAL at 08:30

## 2020-02-16 RX ADMIN — CITALOPRAM 20 MG: 20 TABLET, FILM COATED ORAL at 22:09

## 2020-02-16 RX ADMIN — METOPROLOL TARTRATE 100 MG: 100 TABLET, FILM COATED ORAL at 22:09

## 2020-02-16 RX ADMIN — ACETAMINOPHEN 650 MG: 325 TABLET ORAL at 03:51

## 2020-02-16 RX ADMIN — METOPROLOL TARTRATE 100 MG: 100 TABLET, FILM COATED ORAL at 08:30

## 2020-02-16 RX ADMIN — ACETAMINOPHEN 650 MG: 325 TABLET ORAL at 22:10

## 2020-02-16 RX ADMIN — POTASSIUM CHLORIDE 20 MEQ: 1500 TABLET, EXTENDED RELEASE ORAL at 08:34

## 2020-02-16 RX ADMIN — ALLOPURINOL 100 MG: 100 TABLET ORAL at 08:31

## 2020-02-16 RX ADMIN — OXYBUTYNIN CHLORIDE 5 MG: 5 TABLET ORAL at 08:30

## 2020-02-16 RX ADMIN — Medication 400 UNITS: at 22:09

## 2020-02-16 RX ADMIN — Medication 10 ML: at 08:29

## 2020-02-16 RX ADMIN — METOLAZONE 5 MG: 5 TABLET ORAL at 08:31

## 2020-02-16 RX ADMIN — Medication 10 ML: at 22:10

## 2020-02-16 RX ADMIN — FOLIC ACID 1 MG: 1 TABLET ORAL at 08:31

## 2020-02-16 RX ADMIN — Medication 3 MG: at 22:10

## 2020-02-16 RX ADMIN — FAMOTIDINE 20 MG: 20 TABLET ORAL at 22:10

## 2020-02-16 RX ADMIN — DOCUSATE SODIUM 100 MG: 100 CAPSULE, LIQUID FILLED ORAL at 08:34

## 2020-02-16 RX ADMIN — AMLODIPINE BESYLATE 5 MG: 5 TABLET ORAL at 08:31

## 2020-02-16 RX ADMIN — LEVOTHYROXINE SODIUM 175 MCG: 150 TABLET ORAL at 06:27

## 2020-02-16 RX ADMIN — ACETAMINOPHEN 650 MG: 325 TABLET ORAL at 15:28

## 2020-02-16 RX ADMIN — LISINOPRIL 10 MG: 10 TABLET ORAL at 08:31

## 2020-02-16 ASSESSMENT — PAIN SCALES - GENERAL
PAINLEVEL_OUTOF10: 0
PAINLEVEL_OUTOF10: 3
PAINLEVEL_OUTOF10: 3
PAINLEVEL_OUTOF10: 0
PAINLEVEL_OUTOF10: 3

## 2020-02-16 NOTE — PROGRESS NOTES
221 lb 11.2 oz (100.6 kg)     Temp Readings from Last 3 Encounters:   02/16/20 98.3 °F (36.8 °C) (Oral)   11/18/18 98.1 °F (36.7 °C) (Axillary)   04/02/18 98.5 °F (36.9 °C) (Oral)     BP Readings from Last 3 Encounters:   02/16/20 (!) 152/76   11/27/19 105/73   11/18/18 119/69     Pulse Readings from Last 3 Encounters:   02/16/20 71   11/27/19 84   11/18/18 72       24 HR INTAKE/OUTPUT :     Intake/Output Summary (Last 24 hours) at 2/16/2020 0833  Last data filed at 2/16/2020 0423  Gross per 24 hour   Intake 610 ml   Output 1350 ml   Net -740 ml   BM = 0      DIET GENERAL;    OBJECTIVE  CURRENT VITALS BP (!) 152/76   Pulse 71   Temp 98.3 °F (36.8 °C) (Oral)   Resp 16   Ht 5' 3\" (1.6 m)   Wt 234 lb 14.4 oz (106.5 kg)   SpO2 96%   BMI 41.61 kg/m²        GENERAL: Alert, cooperative, no distress  NEURO: Awake, alert and oriented. PERRL. Conversing fluently and appropriately   LUNGS: Clear to auscultation bilaterally- no wheezes, rales or rhonchi, normal air movement, no respiratory distress  HEART: Normal rate, normal S1 and S2, no gallops, intact distal pulses and no carotid bruits  ABDOMEN: Obese. Soft, non-tender, non-distended, normal bowel sounds, no masses or organomegaly  WOUNDS: Abrasion just above left eyebrow with no significant drainage, no significant erythema  EXTREMITY: No cyanosis, no clubbing and +1 bilateral lower extremity edema      LABS  CBC :   Recent Labs     02/13/20  1220 02/14/20  0343   WBC 10.3 7.8   HGB 12.4 10.6*   HCT 39.4 34.1*   .8* 103.3*    149     BMP:   Recent Labs     02/13/20  1220 02/14/20  0343    140   K 4.4 4.1    106   CO2 29 24   BUN 34* 28*   CREATININE 1.0 0.9     COAGS:   Recent Labs     02/13/20  1220   APTT 35.7   PROT 6.8   INR 1.23*     Pancreas/HFP:  No results for input(s): LIPASE, AMYLASE in the last 72 hours.   Recent Labs     02/13/20  1220   AST 19   ALT 23   BILITOT 0.6   ALKPHOS 89       RADIOLOGY:    Narrative   PROCEDURE: XR CHEST PORTABLE       CLINICAL INFORMATION: shortness of breath; low grade fever.  Cough.       COMPARISON: 2/13/2020.       TECHNIQUE: AP upright view of the chest.       FINDINGS:   There is again noted to be enlarged cardiac silhouette. There are linear opacities at the bilateral lung bases and left retrocardiac opacification perhaps worsened in the interval. Visualized osseous structures appear grossly intact.           Impression   Bibasilar and left retrocardiac opacification as evidence for atelectasis or infiltrate. Stable cardiomegaly.                   **This report has been created using voice recognition software. It may contain minor errors which are inherent in voice recognition technology. **       Final report electronically signed by Dr. Dameon Mcneil MD on 2/16/2020 10:40 AM       Narrative   PROCEDURE: CT HEAD WO CONTRAST       CLINICAL INFORMATION: Intraventricular hemorrhage        COMPARISON: 2/13/2020       TECHNIQUE:  Axial CT images were obtained through the head without contrast. Coronal and sagittal reformatted images were rendered. All CT scans at this facility use dose modulation, iterative reconstruction, and/or weight-based dosing when appropriate    to reduce radiation dose to as low as reasonably achievable.       FINDINGS:        There is mild layering intraventricular hemorrhage demonstrated bilaterally which appear stable compared to the prior examination. This is seen on axial image 16.       There is an area of encephalomalacia demonstrated within the left frontal parietal region near the left apex with an area of left parasagittal low-attenuation located just cephalad to the left lateral ventricle. This also involves the frontoparietal    region and contains multifocal areas of hyperdensity. The anteriormost area of hyperdensity appears to represent calcification.  However the more posterior areas of hyperdensity demonstrated on axial image 24 may represent calcifications versus    hemorrhage. Further characterization can be obtained with MRI.       There is underlying mild to moderate diffuse atrophy. Patchy periventricular white matter hypoattenuation demonstrated possibly representing underlying moderate chronic small vessel ischemic change. There is a lacunar infarct demonstrated at the left the    basal ganglia which is unchanged from November 2009.       The visualized paranasal sinuses demonstrates mild mucosal thickening within the posterior left ethmoid sinus region as well as opacification of the left sphenoid cellule. No acute abdomen bodies of the calvarium are seen. The globes and orbits appear    intact.           Impression   1. Redemonstration of mild intraventricular hemorrhage is noted dependently bilaterally within the posterior horn of the lateral ventricles.       2. There is an area of encephalomalacia demonstrated within the left frontal parietal region near the left apex with an area of adjacent left parasagittal low-attenuation located just cephalad to the left lateral ventricle. This also involves the    frontoparietal region and contains multifocal areas of hyperdensity. The anteriormost area of hyperdensity appears to represent calcification. However the more posterior areas of hyperdensity demonstrated on axial image 24 may represent calcifications    versus hemorrhage. Further characterization can be obtained with MRI.           **This report has been created using voice recognition software.  It may contain minor errors which are inherent in voice recognition technology. **       Final report electronically signed by Dr. Merrill Barnett on 2/14/2020 7:36 AM           Electronically signed by Lashay Archer PA-C on 2/16/2020 at 8:33 AM Patient seen and examined independently by me. Above discussed and I agree with CNP. Labs, cultures, and radiographs where available were reviewed. See orders for the updated patient care plan.     Nika Gonzalez MD, patient resting in bed is awake alert has ecchymoses about the left periorbital area lungs are otherwise clear abdomen is soft awaiting nursing home placement patient is stable  2/16/2020   4:38 PM

## 2020-02-16 NOTE — CONSULTS
disease        Past Surgical History:        Procedure Laterality Date    CHOLECYSTECTOMY, LAPAROSCOPIC  04/24/2017    COLONOSCOPY      EKG 12-LEAD  8/26/2015         HYSTERECTOMY      JOINT REPLACEMENT      bilat knee replacement       Medications: Scheduled Meds:   folic acid  1 mg Oral Daily    oxybutynin  5 mg Oral Daily    potassium chloride  20 mEq Oral Daily    vitamin E  400 Units Oral Nightly    sodium chloride flush  10 mL Intravenous 2 times per day    docusate sodium  100 mg Oral BID    amLODIPine  5 mg Oral Daily    allopurinol  100 mg Oral Daily    citalopram  20 mg Oral Nightly    famotidine  20 mg Oral Nightly    levothyroxine  175 mcg Oral Daily    lisinopril  10 mg Oral Daily    metOLazone  5 mg Oral Daily    metoprolol  100 mg Oral BID    rosuvastatin  5 mg Oral Daily     Continuous Infusions:  PRN Meds:.ipratropium-albuterol, melatonin, sodium chloride flush, acetaminophen, magnesium hydroxide, ondansetron, potassium chloride    Allergies:  Patient has no known allergies. Social History:    reports that she has never smoked. She has never used smokeless tobacco. She reports that she does not drink alcohol or use drugs.       Family History:       Problem Relation Age of Onset    Other Mother     Heart Disease Father          Review of Systems:  Constitutional: ROS: negative for - chills or fever  Head: no headache, no head injury, no migraine   Eyes ROS: denies blurred/double vision  Ears ROS: no hearing difficulty, no tinnitus  Mouth and Throat ROS: no ulceration, dysphagia, dental caries  Psychological ROS: no depression, no anxiety, no panic attacks, denies suicide/homicide ideation  Endocrine ROS: denies polyuria, polydypsia, no heat or cold intolerance  Respiratory ROS: no cough, shortness of breath, or wheezing  Cardiovascular ROS: no chest pain or dyspnea on exertion  Gastrointestinal ROS: no abdominal pain, change in bowel habits, or black or bloody stools  Genito-Urinary ROS: denies dysuria, frequency, urgency; denies hematuria  Musculoskeletal ROS: negative  Neurological ROS: no syncope, no seizures, no numbness or tingling of hands, no numbness or tingling of feet, no paresis  Dermatology: no skin rash, no eczema  Endocrine: no polyuria, polydypsia, no heat/cold intolerance  Hematology: denies bruising easily, denies bleeding problems, denies clotting disorders            Physical Exam:    Vitals:  Patient Vitals for the past 24 hrs:   BP Temp Temp src Pulse Resp SpO2 Weight   02/16/20 0802 (!) 152/76 98.3 °F (36.8 °C) Oral 71 16 96 % --   02/16/20 0423 128/70 99.4 °F (37.4 °C) Oral 66 24 94 % 234 lb 14.4 oz (106.5 kg)   02/16/20 0024 (!) 142/83 99.5 °F (37.5 °C) Oral 71 22 92 % --   02/15/20 2054 (!) 167/94 98.5 °F (36.9 °C) Oral 75 16 93 % --   02/15/20 1825 (!) 155/90 100.1 °F (37.8 °C) Oral 75 18 95 % --   02/15/20 1705 130/81 -- -- 68 23 98 % --   02/15/20 1605 131/75 99.4 °F (37.4 °C) Oral 63 17 98 % --   02/15/20 1505 135/84 -- -- 64 29 96 % --   02/15/20 1405 118/73 -- -- 66 21 96 % --   02/15/20 1305 (!) 148/85 -- -- 73 19 99 % --   02/15/20 1205 111/72 -- -- 79 24 97 % --   02/15/20 1203 -- 99.1 °F (37.3 °C) Oral -- -- -- --   02/15/20 1105 134/86 -- -- 65 16 98 % --   02/15/20 1045 -- -- -- -- 25 97 % --   02/15/20 1005 (!) 141/82 -- -- 66 20 96 % --   02/15/20 0905 132/77 -- -- 70 15 92 % --     Weight: Weight: 234 lb 14.4 oz (106.5 kg)     24 hour intake/output:    Intake/Output Summary (Last 24 hours) at 2/16/2020 0857  Last data filed at 2/16/2020 0423  Gross per 24 hour   Intake 610 ml   Output 1350 ml   Net -740 ml     General appearance - oriented to person, place, and time, overweight and chronically ill appearing; appears stated age  HEENT-multiple bruises noted; PERRL; conjunctiva pink; sclera anicteric; oral mucosa pink and moist; trachea midline; neck supple; no carotid bruit auscultated; no JVD  Respiratory - decreased air entry noted throughout  Cardiovascular - irregularly irregular rhythm with rate 72  Gastrointestinal - soft, nontender, nondistended, active bowel sounds x 4  Obese: Yes  Neurological - alert and oriented to person, place, time; cranial nerves 2-12 grossly intact; speech is clear   Musculoskeletal - abnormal exam of right arm (mild contracture) and right foot (foot drop), movement of extremities x 4 intact; generalized edema; pedal and posterior tibialis pulses 2+  Integumentary - pink, warm, dry    Review of Labs and Diagnostic Testing:    CBC:   Recent Labs     02/13/20  1220 02/14/20  0343   WBC 10.3 7.8   HGB 12.4 10.6*    149     BMP:    Recent Labs     02/13/20  1220 02/14/20  0343    140   K 4.4 4.1    106   CO2 29 24   BUN 34* 28*   CREATININE 1.0 0.9   GLUCOSE 107 97     Calcium:  Recent Labs     02/14/20  0343   CALCIUM 8.9     Ionized Calcium:No results for input(s): IONCA in the last 72 hours. Magnesium:No results for input(s): MG in the last 72 hours. Phosphorus:No results for input(s): PHOS in the last 72 hours. BNP:No results for input(s): BNP in the last 72 hours. Glucose:No results for input(s): POCGLU in the last 72 hours. HgbA1C: No results for input(s): LABA1C in the last 72 hours. INR:   Recent Labs     02/13/20  1220   INR 1.23*     Hepatic:   Recent Labs     02/13/20  1220   ALKPHOS 89   ALT 23   AST 19   PROT 6.8   BILITOT 0.6   LABALBU 4.2     Amylase and Lipase:No results for input(s): LACTA, AMYLASE in the last 72 hours. Lactic Acid: No results for input(s): LACTA in the last 72 hours. Troponin:   Recent Labs     02/13/20  1220   TROPONINT 0.012*     BNP: No results for input(s): BNP in the last 72 hours. Lipids: No results for input(s): CHOL, TRIG, HDL, LDLCALC in the last 72 hours. Invalid input(s): LDL  ABGs: No results found for: PHART, PO2ART, ATJ1GXF, [id number], BEART       Xr Chest Standard (2 Vw)    Result Date: 2/13/2020  FINDINGS: Cardiomegaly.  Prominent interstitium. Possible small left pleural effusion. Correlate for interstitial edema/CHF. Ectatic thoracic aorta. No acute osseous findings. Cardiomegaly. Prominent interstitium. Possible small left pleural effusion. Correlate for interstitial edema/CHF     Xr Pelvis (1-2 Views)    Result Date: 2/13/2020  FINDINGS: Fluid within the pelvis. Narrowing of the SI joints and hip joints. Degenerative changes. Degenerative changes lumbar spine. Air and stool in the visualized colon. No acute osseous findings. Soft tissues are unremarkable. No acute findings. Ct Head Without Contrast    Result Date: 2/14/2020  FINDINGS: There is mild layering intraventricular hemorrhage demonstrated bilaterally which appear stable compared to the prior examination. This is seen on axial image 16. There is an area of encephalomalacia demonstrated within the left frontal parietal region near the left apex with an area of left parasagittal low-attenuation located just cephalad to the left lateral ventricle. This also involves the frontoparietal region and contains multifocal areas of hyperdensity. The anteriormost area of hyperdensity appears to represent calcification. However the more posterior areas of hyperdensity demonstrated on axial image 24 may represent calcifications versus hemorrhage. Further characterization can be obtained with MRI. There is underlying mild to moderate diffuse atrophy. Patchy periventricular white matter hypoattenuation demonstrated possibly representing underlying moderate chronic small vessel ischemic change. There is a lacunar infarct demonstrated at the left the  basal ganglia which is unchanged from November 2009. The visualized paranasal sinuses demonstrates mild mucosal thickening within the posterior left ethmoid sinus region as well as opacification of the left sphenoid cellule. No acute abdomen bodies of the calvarium are seen. The globes and orbits appear intact.      1. Redemonstration of mild intraventricular hemorrhage is noted dependently bilaterally within the posterior horn of the lateral ventricles. 2. There is an area of encephalomalacia demonstrated within the left frontal parietal region near the left apex with an area of adjacent left parasagittal low-attenuation located just cephalad to the left lateral ventricle. This also involves the frontoparietal region and contains multifocal areas of hyperdensity. The anteriormost area of hyperdensity appears to represent calcification. However the more posterior areas of hyperdensity demonstrated on axial image 24 may represent calcifications versus hemorrhage. Further characterization can be obtained with MRI. Ct Head Wo Contrast    Result Date: 2/13/2020  FINDINGS: Generalized volume loss and small vessel ischemic changes. Encephalomalacia high left frontoparietal lobe with small area of coarse calcification. Additional curvilinear areas of higher attenuation is also thought to represent calcification however small  amount of hemorrhage cannot be excluded. Prominent ventricles. Small amount of layering blood in the posterior horns of the lateral ventricles. Old left basal ganglia infarction. Chronic opacification of the sphenoid and ethmoid sinuses. Mastoid air cells are patent. Skull: Unremarkable. Soft tissues small frontal scalp hematoma. 1. Small amount of layering blood in the posterior horns of the lateral ventricles. 2. Encephalomalacia high left frontoparietal lobe with area of coarse calcification and then mixed high attenuation which could reflect calcification or minimal hemorrhage. Calcification is favored. MRI could be performed if clinically warranted. Ct Cervical Spine Wo Contrast    Result Date: 2/13/2020  FINDINGS: Opacified sphenoid sinuses. Mastoid air cells are clear. Facet hypertrophy and degenerative changes. Mild reversal of the normal cervical curvature. No acute fracture or subluxation.  Partial fusion C5-C6 and C7. Multilevel disc space narrowing. Prevertebral soft tissues are unremarkable. Degenerative changes. No acute fracture. EKG: atrial fib        Thank you  for allowing me to participate in this patient's care.     Isabel Yeh, CNP

## 2020-02-16 NOTE — PLAN OF CARE
Problem: Falls - Risk of:  Goal: Will remain free from falls  Description  Will remain free from falls  2/16/2020 1616 by Scarlet Brito  Outcome: Met This Shift  Note:   PT remained free from falls this shift while maintaining safety precautions. Pts bed alarm is on, bed in lowest position, wheels locked, call light in reach. PT is located close to the nurses station for additional safety. Problem: Risk for Impaired Skin Integrity  Goal: Tissue integrity - skin and mucous membranes  Description  Structural intactness and normal physiological function of skin and  mucous membranes. Outcome: Ongoing  Note:   Turning PT Q2 hours and as needed for comfort. PT heels are offloading from the bed with pillow support. Barrier cream is applied after PT uses bedpan/changing brief. Problem: Pain:  Goal: Pain level will decrease  Description  Pain level will decrease  Outcome: Ongoing  Note:   PT states she has 3/10 level pain around her tailbone. Administered 650 mg of tylenol. PT repositioned for extra comfort. Problem: HEMODYNAMIC STATUS  Goal: Patient has stable vital signs and fluid balance  Outcome: Ongoing  Note:   Vital signs stable other than blood pressure. PT BP elevated during the afternoon after turning multiple times to use the bedpan. Rechecked BP and will continue to monitor. Problem: ACTIVITY INTOLERANCE/IMPAIRED MOBILITY  Goal: Mobility/activity is maintained at optimum level for patient  Outcome: Ongoing  Note:   PT/OT continuing to work with PT. PT has very limited mobility due to painful bones. Problem: COMMUNICATION IMPAIRMENT  Goal: Ability to express needs and understand communication  Outcome: Ongoing  Note:   PT able to use call light when expressing her needs and communication. Hourly visual/rounding's done for PT to express any needs when we are available.      Problem: Bleeding:  Goal: Will show no signs and symptoms of excessive bleeding  Description  Will show no signs and symptoms of excessive bleeding  Outcome: Ongoing  Note:   No new signs of bleed. Bruises remain the same size as on admission. Care plan reviewed with patient and family. Patient and family verbalize understanding of the plan of care and contribute to goal setting.

## 2020-02-17 ENCOUNTER — APPOINTMENT (OUTPATIENT)
Dept: CT IMAGING | Age: 80
DRG: 085 | End: 2020-02-17
Payer: MEDICARE

## 2020-02-17 VITALS
OXYGEN SATURATION: 97 % | HEART RATE: 79 BPM | TEMPERATURE: 98.3 F | DIASTOLIC BLOOD PRESSURE: 89 MMHG | RESPIRATION RATE: 18 BRPM | SYSTOLIC BLOOD PRESSURE: 136 MMHG | BODY MASS INDEX: 41.62 KG/M2 | HEIGHT: 63 IN | WEIGHT: 234.9 LBS

## 2020-02-17 PROCEDURE — APPNB180 APP NON BILLABLE TIME > 60 MINS: Performed by: PHYSICIAN ASSISTANT

## 2020-02-17 PROCEDURE — 99231 SBSQ HOSP IP/OBS SF/LOW 25: CPT | Performed by: PHYSICIAN ASSISTANT

## 2020-02-17 PROCEDURE — 70450 CT HEAD/BRAIN W/O DYE: CPT

## 2020-02-17 PROCEDURE — 6370000000 HC RX 637 (ALT 250 FOR IP): Performed by: NURSE PRACTITIONER

## 2020-02-17 PROCEDURE — 2580000003 HC RX 258: Performed by: PHYSICIAN ASSISTANT

## 2020-02-17 PROCEDURE — 6370000000 HC RX 637 (ALT 250 FOR IP): Performed by: PHYSICIAN ASSISTANT

## 2020-02-17 PROCEDURE — 99233 SBSQ HOSP IP/OBS HIGH 50: CPT | Performed by: SURGERY

## 2020-02-17 PROCEDURE — APPSS30 APP SPLIT SHARED TIME 16-30 MINUTES: Performed by: PHYSICIAN ASSISTANT

## 2020-02-17 PROCEDURE — 97530 THERAPEUTIC ACTIVITIES: CPT

## 2020-02-17 PROCEDURE — 97535 SELF CARE MNGMENT TRAINING: CPT

## 2020-02-17 PROCEDURE — APPSS60 APP SPLIT SHARED TIME 46-60 MINUTES: Performed by: PHYSICIAN ASSISTANT

## 2020-02-17 PROCEDURE — 6370000000 HC RX 637 (ALT 250 FOR IP): Performed by: SURGERY

## 2020-02-17 PROCEDURE — 2709999900 HC NON-CHARGEABLE SUPPLY

## 2020-02-17 RX ORDER — SULFAMETHOXAZOLE AND TRIMETHOPRIM 800; 160 MG/1; MG/1
1 TABLET ORAL EVERY 12 HOURS SCHEDULED
Qty: 10 TABLET | Refills: 0 | DISCHARGE
Start: 2020-02-17 | End: 2020-02-22

## 2020-02-17 RX ORDER — SULFAMETHOXAZOLE AND TRIMETHOPRIM 800; 160 MG/1; MG/1
1 TABLET ORAL EVERY 12 HOURS SCHEDULED
Status: DISCONTINUED | OUTPATIENT
Start: 2020-02-17 | End: 2020-02-17 | Stop reason: HOSPADM

## 2020-02-17 RX ADMIN — SULFAMETHOXAZOLE AND TRIMETHOPRIM 1 TABLET: 800; 160 TABLET ORAL at 10:29

## 2020-02-17 RX ADMIN — POTASSIUM CHLORIDE 20 MEQ: 1500 TABLET, EXTENDED RELEASE ORAL at 10:29

## 2020-02-17 RX ADMIN — LISINOPRIL 10 MG: 10 TABLET ORAL at 08:52

## 2020-02-17 RX ADMIN — OXYBUTYNIN CHLORIDE 5 MG: 5 TABLET ORAL at 08:52

## 2020-02-17 RX ADMIN — ROSUVASTATIN CALCIUM 5 MG: 10 TABLET, FILM COATED ORAL at 08:52

## 2020-02-17 RX ADMIN — ALLOPURINOL 100 MG: 100 TABLET ORAL at 08:53

## 2020-02-17 RX ADMIN — METOPROLOL TARTRATE 100 MG: 100 TABLET, FILM COATED ORAL at 08:53

## 2020-02-17 RX ADMIN — LEVOTHYROXINE SODIUM 175 MCG: 150 TABLET ORAL at 06:48

## 2020-02-17 RX ADMIN — Medication 10 ML: at 08:55

## 2020-02-17 RX ADMIN — METOLAZONE 5 MG: 5 TABLET ORAL at 08:52

## 2020-02-17 RX ADMIN — AMLODIPINE BESYLATE 5 MG: 5 TABLET ORAL at 08:53

## 2020-02-17 RX ADMIN — FOLIC ACID 1 MG: 1 TABLET ORAL at 08:52

## 2020-02-17 ASSESSMENT — PAIN SCALES - GENERAL: PAINLEVEL_OUTOF10: 0

## 2020-02-17 NOTE — CARE COORDINATION
2/17/20, 1:25 PM    Patient goals/plan/ treatment preferences discussed by  and . Patient goals/plan/ treatment preferences reviewed with patient/ family. Patient/ family verbalize understanding of discharge plan and are in agreement with goal/plan/treatment preferences. Understanding was demonstrated using the teach back method. AVS provided by RN at time of discharge, which includes all necessary medical information pertaining to the patients current course of illness, treatment, post-discharge goals of care, and treatment preferences. Services After Discharge  Services At/After Discharge: In ambulance, Nursing Services, Skilled Therapy, Aide Services([facility] [address], skilled medicare)   IMM Letter  IMM Letter given to Patient/Family/Significant other/Guardian/POA/by[de-identified]   IMM Letter date given[de-identified] 02/17/20  IMM Letter time given[de-identified] 1430     Patient is an anticipated discharge today to [facility], skilled medicare. Ambulance transport forms completed and placed on chart along with blue envelope.

## 2020-02-17 NOTE — PROGRESS NOTES
Patient tolerance of  treatment: fair. Pt limited by fatigue. Discharge Recommendations: Subacute/Skilled Nursing Facility, Continue to assess pending progress  Equipment Recommendations: Equipment Needed: No  Other: defer to next level of care  Plan: Times per week: 5x  Current Treatment Recommendations: Strengthening, Balance Training, Functional Mobility Training, Endurance Training, Safety Education & Training, Patient/Caregiver Education & Training, Self-Care / ADL    Patient Education  Patient Education: ADL's    Goals  Short term goals  Time Frame for Short term goals: by discharge  Short term goal 1: Pt will tolerate further assessment of functional mobility/pivot transfers by OTR when appropriate. Short term goal 2: Pt will complete sit<>stand transfers with moderate assistance X2 in prep for [facility] transfers. Short term goal 3: Pt will tolerate static standing X1 minute with minimal assistance X2 in prep for toileting tasks. Short term goal 4: Pt will complete LB ADLs with moderate assistance to increase independence with self care tasks. Long term goals  Time Frame for Long term goals : not set due to ELOS    Following session, patient left in safe position with all fall risk precautions in place.

## 2020-02-17 NOTE — PROGRESS NOTES
Discussed discharge recommendations with Dr. Sheldon Serrano and Dr. Fernando Espinoza. Neurosurgery noted results of repeat CT head this morning and advised the patient was stable for discharge to SNF today with repeat head CT in one week for review. Neurosurgery also stated patient can be restarted on either Aspirin or Eliquis at discharge. Neurosurgery wanted hospitalist recommendation on which to restart. Dr. Fernando Espinoza initially recommended Eliquis be restarted given her coronary artery disease history is stable. However, it was discovered the patient has not been taking Eliqius since August of 2019 per pharmacist on 4A. Pharmacy removed Eliquis from med rec. Patient endorsed that she stopped taking Eliquis due to the cost of drug. Dr. Fernando Espinoza thoroughly explained to the patient the risks, benefits, and alternatives of restarting Eliquis. Dr. Fernando Espinoza provided patient education on atrial fibrillation and risk of stroke. Moving forward, the patient decided to restart Aspirin and hold on restarting anticoagulation until she can discuss with her  and PCP. Trauma surgery and Dr. Fernando Espinoza recommend patient to follow up with cardiology following discharge to discuss restarting anticoagulation. Discharge summary to follow.     Electronically signed by Bryan Gold PA-C on 2/17/2020 at 4:29 PM

## 2020-02-17 NOTE — PROGRESS NOTES
Neurosurgery Progress Note    Patient:  Sha Miranda      Unit/Bed:4A-14/014-A    YOB: 1940    MRN: [id number]     Acct: [de-identified]     Admit date: 2/13/2020    Chief Complaint   Patient presents with   Equilla Kiara Fall       Patient Seen, Chart, Physician notes, Labs, Radiology studies reviewed. Subjective: She is resting comfortably today and without specific complaints on exam.  No acute changes were recorded to the patient's chart overnight. Past, Family, Social History unchanged from admission. Diet:  DIET CARDIAC;    Medications:  Scheduled Meds:   sulfamethoxazole-trimethoprim  1 tablet Oral 2 times per day    folic acid  1 mg Oral Daily    oxybutynin  5 mg Oral Daily    potassium chloride  20 mEq Oral Daily    vitamin E  400 Units Oral Nightly    sodium chloride flush  10 mL Intravenous 2 times per day    docusate sodium  100 mg Oral BID    amLODIPine  5 mg Oral Daily    allopurinol  100 mg Oral Daily    citalopram  20 mg Oral Nightly    famotidine  20 mg Oral Nightly    levothyroxine  175 mcg Oral Daily    lisinopril  10 mg Oral Daily    metOLazone  5 mg Oral Daily    metoprolol  100 mg Oral BID    rosuvastatin  5 mg Oral Daily     Continuous Infusions:  PRN Meds:ipratropium-albuterol, melatonin, sodium chloride flush, acetaminophen, magnesium hydroxide, ondansetron, potassium chloride    Objective: Sitting up in bed with the head of the bed elevated approximately 30 degrees and is without significant complaints on exam.    Vitals: /78   Pulse 77   Temp 98.5 °F (36.9 °C) (Oral)   Resp 18   Ht 5' 3\" (1.6 m)   Wt 234 lb 14.4 oz (106.5 kg)   SpO2 96%   BMI 41.61 kg/m²   Physical Exam:  Alert and attentive. Language appropriate, with no aphasia. Pupils equal.  Facial strength symmetric. Physical Exam  Significant changes in her physical exam since admission. See HPI.    ROS:  Review of Systems  Constitutional: Negative for fever.    HENT: Negative inherent in voice recognition technology. ** Final report electronically signed by Dr. Juani Ludwig on 2/13/2020 1:21 PM    Xr Pelvis (1-2 Views)    Result Date: 2/13/2020  PROCEDURE: XR PELVIS (1-2 VIEWS) CLINICAL INFORMATION: fall. COMPARISON: No prior study. TECHNIQUE: AP view of the pelvis. FINDINGS: Fluid within the pelvis. Narrowing of the SI joints and hip joints. Degenerative changes. Degenerative changes lumbar spine. Air and stool in the visualized colon. No acute osseous findings. Soft tissues are unremarkable. No acute findings. **This report has been created using voice recognition software. It may contain minor errors which are inherent in voice recognition technology. ** Final report electronically signed by Dr. Juani Ludwig on 2/13/2020 1:23 PM    Ct Head Wo Contrast    Result Date: 2/17/2020  PROCEDURE: CT HEAD WO CONTRAST CLINICAL INFORMATION: Follow up intraventricular hemorrhage. COMPARISON: 2/14/2020. TECHNIQUE: Noncontrast 5 mm axial images were obtained through the brain. Sagittal and coronal reconstructions were obtained. All CT scans at this facility use dose modulation, iterative reconstruction, and/or weight-based dosing when appropriate to reduce radiation dose to as low as reasonably achievable. FINDINGS: Redemonstration of a moderate to large area of encephalomalacia in the left paramedian frontal lobe with associated ex vacuo dilatation of the left lateral ventricle. There are focal areas of hyperdense intraparenchymal blood at the posterior margin of the area of encephalomalacia, mildly decreased compared to prior exam. Layering hyperdense intraventricular blood in the occipital horns has increased in the interval. Ventricular size is stable.  Small focal areas of encephalomalacia in the left basal ganglia as evidence for old lacunar infarcts are stable compared to prior exam. There are stable moderate patchy areas of hypodensity in the periventricular, subcortical deep white matter as evidence for chronic microvascular angiopathy. No midline shift  is present. The calvarium appears intact. Patient is status post bilateral lens extractions. Orbits are otherwise unremarkable. Paranasal sinuses and mastoid air cells are clear. 1. Mild interval decrease of intraparenchymal hemorrhage at the margins of prominent area of encephalomalacia in the left. Frontal lobe as evidence for old infarct or trauma. 2. Mild interval increase in intraventricular blood with stable ventricular size. **This report has been created using voice recognition software. It may contain minor errors which are inherent in voice recognition technology. ** Final report electronically signed by Dr. Margie Cornelius MD on 2/17/2020 9:05 AM    Ct Head Without Contrast    Result Date: 2/14/2020  PROCEDURE: CT HEAD WO CONTRAST CLINICAL INFORMATION: Intraventricular hemorrhage COMPARISON: 2/13/2020 TECHNIQUE:  Axial CT images were obtained through the head without contrast. Coronal and sagittal reformatted images were rendered. All CT scans at this facility use dose modulation, iterative reconstruction, and/or weight-based dosing when appropriate to reduce radiation dose to as low as reasonably achievable. FINDINGS: There is mild layering intraventricular hemorrhage demonstrated bilaterally which appear stable compared to the prior examination. This is seen on axial image 16. There is an area of encephalomalacia demonstrated within the left frontal parietal region near the left apex with an area of left parasagittal low-attenuation located just cephalad to the left lateral ventricle. This also involves the frontoparietal region and contains multifocal areas of hyperdensity. The anteriormost area of hyperdensity appears to represent calcification. However the more posterior areas of hyperdensity demonstrated on axial image 24 may represent calcifications versus hemorrhage. Further characterization can be obtained with MRI.  There is underlying mild to moderate diffuse atrophy. Patchy periventricular white matter hypoattenuation demonstrated possibly representing underlying moderate chronic small vessel ischemic change. There is a lacunar infarct demonstrated at the left the  basal ganglia which is unchanged from November 2009. The visualized paranasal sinuses demonstrates mild mucosal thickening within the posterior left ethmoid sinus region as well as opacification of the left sphenoid cellule. No acute abdomen bodies of the calvarium are seen. The globes and orbits appear intact. 1. Redemonstration of mild intraventricular hemorrhage is noted dependently bilaterally within the posterior horn of the lateral ventricles. 2. There is an area of encephalomalacia demonstrated within the left frontal parietal region near the left apex with an area of adjacent left parasagittal low-attenuation located just cephalad to the left lateral ventricle. This also involves the frontoparietal region and contains multifocal areas of hyperdensity. The anteriormost area of hyperdensity appears to represent calcification. However the more posterior areas of hyperdensity demonstrated on axial image 24 may represent calcifications versus hemorrhage. Further characterization can be obtained with MRI. **This report has been created using voice recognition software. It may contain minor errors which are inherent in voice recognition technology. ** Final report electronically signed by Dr. Grant Fernandez on 2/14/2020 7:36 AM    Ct Head Wo Contrast    Result Date: 2/13/2020  PROCEDURE: CT HEAD WO CONTRAST CLINICAL INFORMATION: fall, hit head, on Eliquis, Trauma. COMPARISON: August 24, 2015 TECHNIQUE: Noncontrast 5 mm axial images were obtained through the brain. All CT scans at this facility use dose modulation, iterative reconstruction, and/or weight-based dosing when appropriate to reduce radiation dose to as low as reasonably achievable.  FINDINGS: Generalized volume loss and small vessel ischemic changes. Encephalomalacia high left frontoparietal lobe with small area of coarse calcification. Additional curvilinear areas of higher attenuation is also thought to represent calcification however small  amount of hemorrhage cannot be excluded. Prominent ventricles. Small amount of layering blood in the posterior horns of the lateral ventricles. Old left basal ganglia infarction. Chronic opacification of the sphenoid and ethmoid sinuses. Mastoid air cells are patent. Skull: Unremarkable. Soft tissues small frontal scalp hematoma. 1. Small amount of layering blood in the posterior horns of the lateral ventricles. 2. Encephalomalacia high left frontoparietal lobe with area of coarse calcification and then mixed high attenuation which could reflect calcification or minimal hemorrhage. Calcification is favored. MRI could be performed if clinically warranted. These results were communicated directly with Ceci Valle on 2/13/2020 1:46 PM,  [ ] **This report has been created using voice recognition software. It may contain minor errors which are inherent in voice recognition technology. ** Final report electronically signed by Dr. Nik Jeffrey on 2/13/2020 1:49 PM    Ct Cervical Spine Wo Contrast    Result Date: 2/13/2020  PROCEDURE: CT CERVICAL SPINE WO CONTRAST CLINICAL INFORMATION: fall, neck pain, Trauma. COMPARISON: No prior study. TECHNIQUE: 3 mm noncontrast axial images were obtained through the cervical spine with sagittal and coronal reconstructions. All CT scans at this facility use dose modulation, iterative reconstruction, and/or weight-based dosing when appropriate to reduce radiation dose to as low as reasonably achievable. FINDINGS: Opacified sphenoid sinuses. Mastoid air cells are clear. Facet hypertrophy and degenerative changes. Mild reversal of the normal cervical curvature. No acute fracture or subluxation. Partial fusion C5-C6 and C7.  Multilevel disc space narrowing. Prevertebral soft tissues are unremarkable. Degenerative changes. No acute fracture. **This report has been created using voice recognition software. It may contain minor errors which are inherent in voice recognition technology. ** Final report electronically signed by Dr. Cynthia Leone on 2/13/2020 1:53 PM    Xr Chest Portable    Result Date: 2/16/2020  PROCEDURE: XR CHEST PORTABLE CLINICAL INFORMATION: shortness of breath; low grade fever. Cough. COMPARISON: 2/13/2020. TECHNIQUE: AP upright view of the chest. FINDINGS: There is again noted to be enlarged cardiac silhouette. There are linear opacities at the bilateral lung bases and left retrocardiac opacification perhaps worsened in the interval. Visualized osseous structures appear grossly intact. Bibasilar and left retrocardiac opacification as evidence for atelectasis or infiltrate. Stable cardiomegaly. **This report has been created using voice recognition software. It may contain minor errors which are inherent in voice recognition technology. ** Final report electronically signed by Dr. Cristi Lr MD on 2/16/2020 10:40 AM    A/P: S/P patient remains status post admission with findings of small intraventricular bleed read as mild on CT scan.   A repeat CT scan images morning shows mild interval decrease of intraparenchymal hemorrhage at the margins of the prominent area of encephalomalacia on the left. There is a mild interval increase in intraventricular blood with a stable ventricular size also noted on the image.   Based on image findings and the stable nature of her exam today neurosurgery recommends a follow-up with a repeat CT scan to be imaged in 1 week for review.  Neurosurgery to follow    Electronically signed by Al Phillips PA-C on 2/17/2020 at 10:52 AM

## 2020-02-17 NOTE — PLAN OF CARE
Problem: Falls - Risk of:  Goal: Will remain free from falls  Description  Will remain free from falls  2/17/2020 0540 by Leslie Houston RN  Outcome: Ongoing  Note:   Call light in reach, bed in lowest position, and bed alarm activated. Education given on use of call light before ambulation and when in need of assistance. Patient expressed understanding. Hourly visual checks performed and charted. Toileting offered to patient. No falls this shift, at any time. Arm band and falling star in place. Will continue to monitor. Problem: Falls - Risk of:  Goal: Absence of physical injury  Description  Absence of physical injury  Outcome: Ongoing  Note:   Pt. Remains free from physical injury this shift. Call light in reach. Bed alarm on. Pt. Turned every 2 hours. Problem: Risk for Impaired Skin Integrity  Goal: Tissue integrity - skin and mucous membranes  Description  Structural intactness and normal physiological function of skin and  mucous membranes. 2/17/2020 0540 by Leslie Houston RN  Outcome: Ongoing  Note:   No signs of new skin breakdown with each assessment. Skin remains warm, dry, intact. Mucous membranes pink & moist. Patient is able to turn self. Problem: Pain:  Goal: Pain level will decrease  Description  Pain level will decrease  2/17/2020 0540 by Leslie Houston RN  Outcome: Ongoing  Note:   Pt. Complained of headache at 3/10. Pain goal of no pain. Tylenol given per order. Room darkened for comfort. Problem: Pain:  Goal: Control of acute pain  Description  Control of acute pain  Outcome: Ongoing  Note:   Pt. Complained of headache at 3/10. Pain goal of no pain. Tylenol given per order. Room darkened for comfort. Problem: Pain:  Goal: Control of chronic pain  Description  Control of chronic pain  Outcome: Ongoing  Note:   Pt. Denies chronic pain this shift.       Problem: HEMODYNAMIC STATUS  Goal: Patient has stable vital signs and fluid balance  2/17/2020 0540 by Damaris Ford RN  Outcome: Ongoing  Note:   Vitals:    02/17/20 0417   BP: (!) 156/89   Pulse: 80   Resp: 16   Temp: 98.5 °F (36.9 °C)   SpO2: 95%         Problem: ACTIVITY INTOLERANCE/IMPAIRED MOBILITY  Goal: Mobility/activity is maintained at optimum level for patient  2/17/2020 0540 by Damaris Ford RN  Outcome: Ongoing  Note:   Pt. 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 Turned every 2 hours. Problem: COMMUNICATION IMPAIRMENT  Goal: Ability to express needs and understand communication  2/17/2020 0540 by Damaris Ford RN  Outcome: Ongoing  Note:   No aphasia noted. Speech clear. Able to communicate needs. Problem: Bleeding:  Goal: Will show no signs and symptoms of excessive bleeding  Description  Will show no signs and symptoms of excessive bleeding  2/17/2020 0540 by Damaris Ford RN  Outcome: Ongoing  Note:   No s/s of bleeding noted this shift. Care plan reviewed with patient. Patient verbalizes understanding of the plan of care and contribute to goal setting.

## 2020-02-17 NOTE — DISCHARGE SUMMARY
and Dr. Lashawn Walter, hospitalist.  Neurosurgery noted results of repeat CT head and advised patient was stable for discharge to SNF with repeat head CT in 1 week for review. Neurosurgery also stated patient can be restarted on either aspirin or Eliquis at discharge. Neurosurgery wanted to hospitalist recommendation on which to restart. Dr. Lashawn Walter initially recommended Eliquis to be restarted given her coronary artery disease history is stable. However, it was discovered that the patient had not been taking Eliquis since August 2019 per pharmacist on 4A. Pharmacy removed Eliquis from med rec. Patient endorsed that she stopped taking Eliquis due to cost of the drug. Dr. Lashawn Walter thoroughly explained to the patient's the risk, benefits, and alternatives of restarting Eliquis. Dr. Lashawn Walter provided patient education on atrial fibrillation and risk of stroke. Moving forward, the patient decided to restart Aspirin and hold on restarting anticoagulation until she can discuss with her  and PCP. Trauma surgery and Dr. Lashawn Walter recommend patient to follow up with cardiology following discharge to discuss restarting anticoagulation. Patient was discharged to SNF on 2/17/2020. The discharge medications below. Patient discharged with 5-day course of Bactrim for UTI. Care coordinated with trauma surgeon, Dr. Yuliana Costello.     Discharge Medications:   Ponce Short   Home Medication Instructions NGW:[id number]    Printed on:02/17/20 1602   Medication Information                      acetaminophen 650 MG TABS  Take 650 mg by mouth every 4 hours as needed             allopurinol (ZYLOPRIM) 100 MG tablet  Take 100 mg by mouth daily             amLODIPine (NORVASC) 5 MG tablet  Take 5 mg by mouth daily             aspirin 81 MG EC tablet  Take 81 mg by mouth daily              citalopram (CELEXA) 20 MG tablet  Take 20 mg by mouth nightly              famotidine (PEPCID) 20 MG tablet  Take 1 tablet by °C)  98.4 °F (36.9 °C) 98.3 °F (36.8 °C)   TempSrc: Oral  Oral Oral   SpO2: 96%  96% 97%   Weight:       Height:         Weight: Weight: 234 lb 14.4 oz (106.5 kg)     24 hour intake/output:    Intake/Output Summary (Last 24 hours) at 2/17/2020 1609  Last data filed at 2/17/2020 1457  Gross per 24 hour   Intake 360 ml   Output 250 ml   Net 110 ml       Significant Diagnostics:   Radiology: Xr Chest Standard (2 Vw)    Result Date: 2/13/2020  PROCEDURE: XR CHEST (2 VW) CLINICAL INFORMATION: fall. COMPARISON: November 14, 2018 TECHNIQUE: PA and lateral views the chest. FINDINGS: Cardiomegaly. Prominent interstitium. Possible small left pleural effusion. Correlate for interstitial edema/CHF. Ectatic thoracic aorta. No acute osseous findings. Cardiomegaly. Prominent interstitium. Possible small left pleural effusion. Correlate for interstitial edema/CHF **This report has been created using voice recognition software. It may contain minor errors which are inherent in voice recognition technology. ** Final report electronically signed by Dr. Sony Conklin on 2/13/2020 1:21 PM    Xr Pelvis (1-2 Views)    Result Date: 2/13/2020  PROCEDURE: XR PELVIS (1-2 VIEWS) CLINICAL INFORMATION: fall. COMPARISON: No prior study. TECHNIQUE: AP view of the pelvis. FINDINGS: Fluid within the pelvis. Narrowing of the SI joints and hip joints. Degenerative changes. Degenerative changes lumbar spine. Air and stool in the visualized colon. No acute osseous findings. Soft tissues are unremarkable. No acute findings. **This report has been created using voice recognition software. It may contain minor errors which are inherent in voice recognition technology. ** Final report electronically signed by Dr. Sony Conklin on 2/13/2020 1:23 PM    Ct Head Without Contrast    Result Date: 2/14/2020  PROCEDURE: CT HEAD WO CONTRAST CLINICAL INFORMATION: Intraventricular hemorrhage COMPARISON: 2/13/2020 TECHNIQUE:  Axial CT images were obtained through the head without contrast. Coronal and sagittal reformatted images were rendered. All CT scans at this facility use dose modulation, iterative reconstruction, and/or weight-based dosing when appropriate to reduce radiation dose to as low as reasonably achievable. FINDINGS: There is mild layering intraventricular hemorrhage demonstrated bilaterally which appear stable compared to the prior examination. This is seen on axial image 16. There is an area of encephalomalacia demonstrated within the left frontal parietal region near the left apex with an area of left parasagittal low-attenuation located just cephalad to the left lateral ventricle. This also involves the frontoparietal region and contains multifocal areas of hyperdensity. The anteriormost area of hyperdensity appears to represent calcification. However the more posterior areas of hyperdensity demonstrated on axial image 24 may represent calcifications versus hemorrhage. Further characterization can be obtained with MRI. There is underlying mild to moderate diffuse atrophy. Patchy periventricular white matter hypoattenuation demonstrated possibly representing underlying moderate chronic small vessel ischemic change. There is a lacunar infarct demonstrated at the left the  basal ganglia which is unchanged from November 2009. The visualized paranasal sinuses demonstrates mild mucosal thickening within the posterior left ethmoid sinus region as well as opacification of the left sphenoid cellule. No acute abdomen bodies of the calvarium are seen. The globes and orbits appear intact. 1. Redemonstration of mild intraventricular hemorrhage is noted dependently bilaterally within the posterior horn of the lateral ventricles. 2. There is an area of encephalomalacia demonstrated within the left frontal parietal region near the left apex with an area of adjacent left parasagittal low-attenuation located just cephalad to the left lateral ventricle.  This also involves

## 2020-02-17 NOTE — CARE COORDINATION
02/17/20 7:21 AM    Pt transferred to [facility]. Handoff report given to Mary Jane Jin, 4A CM.

## 2020-02-17 NOTE — PROGRESS NOTES
Report was called to Beverly at the [facility]. AVS was faxed.  Family was notified by Dorcus Noah sent with LACP including AVS, last dose MAR, & H&P.

## 2020-02-17 NOTE — PROGRESS NOTES
denied any headaches, lightheadedness, dizziness, chest pain, shortness of breath, abdominal pain, nausea/vomiting, and paresthesias. Bruising and abrasion noted above left eye appears stable. On exam patient's pupils were equal and reactive to light, PMS intact, with no signs of focal neurological deficits. Patient endorsed having a bowel movement yesterday and tolerated diet. Patient denied any urinary symptoms or dysuria. UA yesterday revealed small leukocytes and nitrite positive. Urinary culture revealed enteric gram-negative bacilli. Patient was started on Bactrim yesterday. Repeat head CT this morning revealed mild interval decrease in intraparenchymal hemorrhage at the margins of prominent area of encephalomalacia in the left, frontal lobe as evident for old infarct or trauma. Mild interval increase in intraventricular blood with stable ventricular size. Nurse notified neurosurgery of repeat CT findings, awaiting further recommendations from neurosurgery. Care coordinated with trauma surgeon, Dr. George Javed.       Wt Readings from Last 3 Encounters:   02/16/20 234 lb 14.4 oz (106.5 kg)   11/27/19 229 lb (103.9 kg)   11/18/18 221 lb 11.2 oz (100.6 kg)     Temp Readings from Last 3 Encounters:   02/17/20 98.5 °F (36.9 °C) (Oral)   11/18/18 98.1 °F (36.7 °C) (Axillary)   04/02/18 98.5 °F (36.9 °C) (Oral)     BP Readings from Last 3 Encounters:   02/17/20 138/78   11/27/19 105/73   11/18/18 119/69     Pulse Readings from Last 3 Encounters:   02/17/20 77   11/27/19 84   11/18/18 72       24 HR INTAKE/OUTPUT :     Intake/Output Summary (Last 24 hours) at 2/17/2020 1054  Last data filed at 2/16/2020 1300  Gross per 24 hour   Intake 120 ml   Output --   Net 120 ml   BM = 0      DIET CARDIAC;    OBJECTIVE  CURRENT VITALS /78   Pulse 77   Temp 98.5 °F (36.9 °C) (Oral)   Resp 18   Ht 5' 3\" (1.6 m)   Wt 234 lb 14.4 oz (106.5 kg)   SpO2 96%   BMI 41.61 kg/m²        GENERAL: Alert, cooperative, no distress, on supplemental O2 via NC  NEURO: Awake, alert and oriented. PERRL. Conversing fluently and appropriately, no signs of focal neurological deficits on exam.  LUNGS: Clear to auscultation bilaterally- no wheezes, rales or rhonchi, normal air movement, no respiratory distress  HEART: Normal rate, normal S1 and S2, no obvious murmurs, rubs, or gallops, intact distal pulses  ABDOMEN: Obese. Soft, non-tender, non-distended, normal bowel sounds, no masses or organomegaly  WOUNDS: Abrasion and ecchymosis just above left eyebrow with no significant drainage, no significant erythema, stable  EXTREMITY: No cyanosis, no clubbing. PMS intact      LABS  CBC :   Recent Labs     02/16/20  1049   WBC 9.4   HGB 12.1   HCT 37.7   .0*        BMP:   Recent Labs     02/16/20  1049      K 4.2      CO2 26   BUN 16   CREATININE 0.8     COAGS:   No results for input(s): APTT, PROT, INR in the last 72 hours. Pancreas/HFP:  No results for input(s): LIPASE, AMYLASE in the last 72 hours. No results for input(s): AST, ALT, BILIDIR, BILITOT, ALKPHOS in the last 72 hours. RADIOLOGY:  Narrative   PROCEDURE: CT HEAD WO CONTRAST       CLINICAL INFORMATION: Follow up intraventricular hemorrhage.       COMPARISON: 2/14/2020.       TECHNIQUE: Noncontrast 5 mm axial images were obtained through the brain. Sagittal and coronal reconstructions were obtained.       All CT scans at this facility use dose modulation, iterative reconstruction, and/or weight-based dosing when appropriate to reduce radiation dose to as low as reasonably achievable.       FINDINGS:   Redemonstration of a moderate to large area of encephalomalacia in the left paramedian frontal lobe with associated ex vacuo dilatation of the left lateral ventricle.  There are focal areas of hyperdense intraparenchymal blood at the posterior margin of    the area of encephalomalacia, mildly decreased compared to prior exam. Layering hyperdense intraventricular blood in the occipital horns has increased in the interval. Ventricular size is stable. Small focal areas of encephalomalacia in the left basal    ganglia as evidence for old lacunar infarcts are stable compared to prior exam. There are stable moderate patchy areas of hypodensity in the periventricular, subcortical deep white matter as evidence for chronic microvascular angiopathy. No midline shift    is present. The calvarium appears intact. Patient is status post bilateral lens extractions. Orbits are otherwise unremarkable. Paranasal sinuses and mastoid air cells are clear.           Impression       1. Mild interval decrease of intraparenchymal hemorrhage at the margins of prominent area of encephalomalacia in the left. Frontal lobe as evidence for old infarct or trauma. 2. Mild interval increase in intraventricular blood with stable ventricular size.                   **This report has been created using voice recognition software. It may contain minor errors which are inherent in voice recognition technology. **       Final report electronically signed by Dr. Ute Flanagan MD on 2/17/2020 9:05 AM           Electronically signed by Doris Christianson PA-C on 2/17/2020 at 10:54 AM     Patient seen and evaluated independently this a.m. Neuro status is stable. Continue with therapies. Follow-up CT noted and reviewed. Stable from a neurosurgical standpoint discharge med as of Ketan Mckenzie continue therapies. 5 days oral antibiotics for evidence of urinary tract infection. Discharge care coordinated directly with Marilou Rouse PA-C. Greater than 45 minutes spent daily assessment, patient examination and evaluation as well as discharge coordination of care.

## 2020-02-19 LAB
ORGANISM: ABNORMAL
URINE CULTURE REFLEX: ABNORMAL

## 2020-02-24 ENCOUNTER — HOSPITAL ENCOUNTER (OUTPATIENT)
Dept: CT IMAGING | Age: 80
Discharge: HOME | End: 2020-02-24
Payer: MEDICARE

## 2020-02-24 PROCEDURE — 70450 CT HEAD/BRAIN W/O DYE: CPT
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
## 2020-11-03 PROBLEM — I48.91 A-FIB (HCC): Status: RESOLVED | Noted: 2018-11-14 | Resolved: 2020-11-03

## 2021-12-03 ENCOUNTER — HOSPITAL ENCOUNTER (INPATIENT)
Age: 81
LOS: 8 days | Discharge: NURSING, REHAB OR LONG-TERM CARE | DRG: 291 | End: 2021-12-11
Attending: EMERGENCY MEDICINE | Admitting: HOSPITALIST
Payer: MEDICARE

## 2021-12-03 ENCOUNTER — APPOINTMENT (OUTPATIENT)
Dept: GENERAL RADIOLOGY | Age: 81
DRG: 291 | End: 2021-12-03
Payer: MEDICARE

## 2021-12-03 DIAGNOSIS — I50.33 ACUTE ON CHRONIC DIASTOLIC CONGESTIVE HEART FAILURE (HCC): ICD-10-CM

## 2021-12-03 DIAGNOSIS — I48.91 NEW ONSET ATRIAL FIBRILLATION (HCC): ICD-10-CM

## 2021-12-03 DIAGNOSIS — J44.1 COPD EXACERBATION (HCC): Primary | ICD-10-CM

## 2021-12-03 DIAGNOSIS — J96.21 ACUTE ON CHRONIC RESPIRATORY FAILURE WITH HYPOXIA (HCC): ICD-10-CM

## 2021-12-03 DIAGNOSIS — J69.0 ASPIRATION PNEUMONIA OF LOWER LOBE, UNSPECIFIED ASPIRATION PNEUMONIA TYPE, UNSPECIFIED LATERALITY (HCC): ICD-10-CM

## 2021-12-03 DIAGNOSIS — N17.9 AKI (ACUTE KIDNEY INJURY) (HCC): ICD-10-CM

## 2021-12-03 LAB
ALBUMIN SERPL-MCNC: 4.4 G/DL (ref 3.5–5.1)
ALLEN TEST: POSITIVE
ALP BLD-CCNC: 95 U/L (ref 38–126)
ALT SERPL-CCNC: 13 U/L (ref 11–66)
ANION GAP SERPL CALCULATED.3IONS-SCNC: 11 MEQ/L (ref 8–16)
AST SERPL-CCNC: 15 U/L (ref 5–40)
BASE EXCESS (CALCULATED): 4 MMOL/L (ref -2.5–2.5)
BASOPHILS # BLD: 0.5 %
BASOPHILS ABSOLUTE: 0.1 THOU/MM3 (ref 0–0.1)
BILIRUB SERPL-MCNC: 0.6 MG/DL (ref 0.3–1.2)
BUN BLDV-MCNC: 28 MG/DL (ref 7–22)
CALCIUM SERPL-MCNC: 9.5 MG/DL (ref 8.5–10.5)
CHLORIDE BLD-SCNC: 105 MEQ/L (ref 98–111)
CO2: 28 MEQ/L (ref 23–33)
COLLECTED BY:: ABNORMAL
CREAT SERPL-MCNC: 1 MG/DL (ref 0.4–1.2)
DEVICE: ABNORMAL
EKG Q-T INTERVAL: 382 MS
EKG QRS DURATION: 86 MS
EKG QTC CALCULATION (BAZETT): 424 MS
EKG R AXIS: -34 DEGREES
EKG T AXIS: 62 DEGREES
EKG VENTRICULAR RATE: 74 BPM
EOSINOPHIL # BLD: 0.5 %
EOSINOPHILS ABSOLUTE: 0.1 THOU/MM3 (ref 0–0.4)
ERYTHROCYTE [DISTWIDTH] IN BLOOD BY AUTOMATED COUNT: 14.5 % (ref 11.5–14.5)
ERYTHROCYTE [DISTWIDTH] IN BLOOD BY AUTOMATED COUNT: 55.7 FL (ref 35–45)
FLU A ANTIGEN: NEGATIVE
FLU B ANTIGEN: NEGATIVE
GFR SERPL CREATININE-BSD FRML MDRD: 53 ML/MIN/1.73M2
GLUCOSE BLD-MCNC: 101 MG/DL (ref 70–108)
HCO3: 30 MMOL/L (ref 23–28)
HCT VFR BLD CALC: 40.5 % (ref 37–47)
HEMOGLOBIN: 12.4 GM/DL (ref 12–16)
IFIO2: 6
IMMATURE GRANS (ABS): 0.1 THOU/MM3 (ref 0–0.07)
IMMATURE GRANULOCYTES: 0.9 %
LACTIC ACID, SEPSIS: 1.3 MMOL/L (ref 0.5–1.9)
LYMPHOCYTES # BLD: 8.3 %
LYMPHOCYTES ABSOLUTE: 0.9 THOU/MM3 (ref 1–4.8)
MCH RBC QN AUTO: 32.5 PG (ref 26–33)
MCHC RBC AUTO-ENTMCNC: 30.6 GM/DL (ref 32.2–35.5)
MCV RBC AUTO: 106 FL (ref 81–99)
MONOCYTES # BLD: 3.5 %
MONOCYTES ABSOLUTE: 0.4 THOU/MM3 (ref 0.4–1.3)
NUCLEATED RED BLOOD CELLS: 0 /100 WBC
O2 SATURATION: 94 %
OSMOLALITY CALCULATION: 292.5 MOSMOL/KG (ref 275–300)
PCO2: 52 MMHG (ref 35–45)
PH BLOOD GAS: 7.38 (ref 7.35–7.45)
PLATELET # BLD: 156 THOU/MM3 (ref 130–400)
PMV BLD AUTO: 9.8 FL (ref 9.4–12.4)
PO2: 74 MMHG (ref 71–104)
POTASSIUM REFLEX MAGNESIUM: 4.7 MEQ/L (ref 3.5–5.2)
PRO-BNP: 4260 PG/ML (ref 0–1800)
PROCALCITONIN: 0.09 NG/ML (ref 0.01–0.09)
RBC # BLD: 3.82 MILL/MM3 (ref 4.2–5.4)
SARS-COV-2, NAAT: NOT DETECTED
SEG NEUTROPHILS: 86.3 %
SEGMENTED NEUTROPHILS ABSOLUTE COUNT: 9.4 THOU/MM3 (ref 1.8–7.7)
SODIUM BLD-SCNC: 144 MEQ/L (ref 135–145)
SOURCE, BLOOD GAS: ABNORMAL
TOTAL PROTEIN: 6.8 G/DL (ref 6.1–8)
TROPONIN T: 0.02 NG/ML
WBC # BLD: 10.9 THOU/MM3 (ref 4.8–10.8)

## 2021-12-03 PROCEDURE — 36600 WITHDRAWAL OF ARTERIAL BLOOD: CPT

## 2021-12-03 PROCEDURE — 99285 EMERGENCY DEPT VISIT HI MDM: CPT

## 2021-12-03 PROCEDURE — 83880 ASSAY OF NATRIURETIC PEPTIDE: CPT

## 2021-12-03 PROCEDURE — 93005 ELECTROCARDIOGRAM TRACING: CPT | Performed by: STUDENT IN AN ORGANIZED HEALTH CARE EDUCATION/TRAINING PROGRAM

## 2021-12-03 PROCEDURE — 2700000000 HC OXYGEN THERAPY PER DAY

## 2021-12-03 PROCEDURE — 96374 THER/PROPH/DIAG INJ IV PUSH: CPT

## 2021-12-03 PROCEDURE — 2140000000 HC CCU INTERMEDIATE R&B

## 2021-12-03 PROCEDURE — 85025 COMPLETE CBC W/AUTO DIFF WBC: CPT

## 2021-12-03 PROCEDURE — 6370000000 HC RX 637 (ALT 250 FOR IP): Performed by: STUDENT IN AN ORGANIZED HEALTH CARE EDUCATION/TRAINING PROGRAM

## 2021-12-03 PROCEDURE — 6360000002 HC RX W HCPCS: Performed by: EMERGENCY MEDICINE

## 2021-12-03 PROCEDURE — 84484 ASSAY OF TROPONIN QUANT: CPT

## 2021-12-03 PROCEDURE — 80053 COMPREHEN METABOLIC PANEL: CPT

## 2021-12-03 PROCEDURE — 87635 SARS-COV-2 COVID-19 AMP PRB: CPT

## 2021-12-03 PROCEDURE — 82803 BLOOD GASES ANY COMBINATION: CPT

## 2021-12-03 PROCEDURE — 71045 X-RAY EXAM CHEST 1 VIEW: CPT

## 2021-12-03 PROCEDURE — 94640 AIRWAY INHALATION TREATMENT: CPT

## 2021-12-03 PROCEDURE — 36415 COLL VENOUS BLD VENIPUNCTURE: CPT

## 2021-12-03 PROCEDURE — 83605 ASSAY OF LACTIC ACID: CPT

## 2021-12-03 PROCEDURE — 84145 PROCALCITONIN (PCT): CPT

## 2021-12-03 PROCEDURE — 6360000002 HC RX W HCPCS: Performed by: STUDENT IN AN ORGANIZED HEALTH CARE EDUCATION/TRAINING PROGRAM

## 2021-12-03 PROCEDURE — 93010 ELECTROCARDIOGRAM REPORT: CPT | Performed by: INTERNAL MEDICINE

## 2021-12-03 PROCEDURE — 87804 INFLUENZA ASSAY W/OPTIC: CPT

## 2021-12-03 RX ORDER — ACETAMINOPHEN 650 MG/1
650 SUPPOSITORY RECTAL EVERY 6 HOURS PRN
Status: DISCONTINUED | OUTPATIENT
Start: 2021-12-03 | End: 2021-12-11 | Stop reason: HOSPADM

## 2021-12-03 RX ORDER — DOXEPIN HYDROCHLORIDE 10 MG/1
6 CAPSULE ORAL NIGHTLY
Status: ON HOLD | COMMUNITY
End: 2021-12-11 | Stop reason: HOSPADM

## 2021-12-03 RX ORDER — ONDANSETRON 4 MG/1
4 TABLET, ORALLY DISINTEGRATING ORAL EVERY 8 HOURS PRN
Status: DISCONTINUED | OUTPATIENT
Start: 2021-12-03 | End: 2021-12-11 | Stop reason: HOSPADM

## 2021-12-03 RX ORDER — AMLODIPINE BESYLATE 5 MG/1
5 TABLET ORAL DAILY
Status: DISCONTINUED | OUTPATIENT
Start: 2021-12-04 | End: 2021-12-11 | Stop reason: HOSPADM

## 2021-12-03 RX ORDER — ASPIRIN 81 MG/1
81 TABLET ORAL DAILY
Status: DISCONTINUED | OUTPATIENT
Start: 2021-12-04 | End: 2021-12-11 | Stop reason: HOSPADM

## 2021-12-03 RX ORDER — METOPROLOL TARTRATE 100 MG/1
100 TABLET ORAL 2 TIMES DAILY
Status: DISCONTINUED | OUTPATIENT
Start: 2021-12-04 | End: 2021-12-11 | Stop reason: HOSPADM

## 2021-12-03 RX ORDER — LISINOPRIL 10 MG/1
10 TABLET ORAL DAILY
Status: DISCONTINUED | OUTPATIENT
Start: 2021-12-04 | End: 2021-12-11 | Stop reason: HOSPADM

## 2021-12-03 RX ORDER — ONDANSETRON 2 MG/ML
4 INJECTION INTRAMUSCULAR; INTRAVENOUS EVERY 6 HOURS PRN
Status: DISCONTINUED | OUTPATIENT
Start: 2021-12-03 | End: 2021-12-11 | Stop reason: HOSPADM

## 2021-12-03 RX ORDER — SODIUM CHLORIDE 9 MG/ML
25 INJECTION, SOLUTION INTRAVENOUS PRN
Status: DISCONTINUED | OUTPATIENT
Start: 2021-12-03 | End: 2021-12-11 | Stop reason: HOSPADM

## 2021-12-03 RX ORDER — ALBUTEROL SULFATE 2.5 MG/3ML
2.5 SOLUTION RESPIRATORY (INHALATION) ONCE
Status: DISCONTINUED | OUTPATIENT
Start: 2021-12-03 | End: 2021-12-03

## 2021-12-03 RX ORDER — FAMOTIDINE 20 MG/1
20 TABLET, FILM COATED ORAL NIGHTLY
Status: DISCONTINUED | OUTPATIENT
Start: 2021-12-04 | End: 2021-12-11 | Stop reason: HOSPADM

## 2021-12-03 RX ORDER — DEXAMETHASONE SODIUM PHOSPHATE 4 MG/ML
6 INJECTION, SOLUTION INTRA-ARTICULAR; INTRALESIONAL; INTRAMUSCULAR; INTRAVENOUS; SOFT TISSUE ONCE
Status: COMPLETED | OUTPATIENT
Start: 2021-12-03 | End: 2021-12-03

## 2021-12-03 RX ORDER — METOLAZONE 5 MG/1
5 TABLET ORAL DAILY
Status: DISCONTINUED | OUTPATIENT
Start: 2021-12-04 | End: 2021-12-11 | Stop reason: HOSPADM

## 2021-12-03 RX ORDER — IPRATROPIUM BROMIDE AND ALBUTEROL SULFATE 2.5; .5 MG/3ML; MG/3ML
1 SOLUTION RESPIRATORY (INHALATION)
Status: DISCONTINUED | OUTPATIENT
Start: 2021-12-03 | End: 2021-12-03

## 2021-12-03 RX ORDER — IPRATROPIUM BROMIDE AND ALBUTEROL SULFATE 2.5; .5 MG/3ML; MG/3ML
1 SOLUTION RESPIRATORY (INHALATION)
Status: DISCONTINUED | OUTPATIENT
Start: 2021-12-04 | End: 2021-12-11 | Stop reason: HOSPADM

## 2021-12-03 RX ORDER — ACETAMINOPHEN 325 MG/1
650 TABLET ORAL EVERY 6 HOURS PRN
Status: DISCONTINUED | OUTPATIENT
Start: 2021-12-03 | End: 2021-12-11 | Stop reason: HOSPADM

## 2021-12-03 RX ORDER — POLYETHYLENE GLYCOL 3350 17 G/17G
17 POWDER, FOR SOLUTION ORAL DAILY PRN
Status: DISCONTINUED | OUTPATIENT
Start: 2021-12-03 | End: 2021-12-11 | Stop reason: HOSPADM

## 2021-12-03 RX ORDER — ALLOPURINOL 100 MG/1
100 TABLET ORAL DAILY
Status: DISCONTINUED | OUTPATIENT
Start: 2021-12-04 | End: 2021-12-11 | Stop reason: HOSPADM

## 2021-12-03 RX ORDER — POTASSIUM CHLORIDE 20 MEQ/1
20 TABLET, EXTENDED RELEASE ORAL DAILY
Status: DISCONTINUED | OUTPATIENT
Start: 2021-12-04 | End: 2021-12-11 | Stop reason: HOSPADM

## 2021-12-03 RX ORDER — CITALOPRAM 20 MG/1
20 TABLET ORAL NIGHTLY
Status: DISCONTINUED | OUTPATIENT
Start: 2021-12-03 | End: 2021-12-04

## 2021-12-03 RX ORDER — FOLIC ACID 1 MG/1
1 TABLET ORAL DAILY
Status: DISCONTINUED | OUTPATIENT
Start: 2021-12-04 | End: 2021-12-11 | Stop reason: HOSPADM

## 2021-12-03 RX ORDER — ROSUVASTATIN CALCIUM 10 MG/1
5 TABLET, COATED ORAL DAILY
Status: DISCONTINUED | OUTPATIENT
Start: 2021-12-04 | End: 2021-12-04

## 2021-12-03 RX ORDER — IPRATROPIUM BROMIDE AND ALBUTEROL SULFATE 2.5; .5 MG/3ML; MG/3ML
1 SOLUTION RESPIRATORY (INHALATION) ONCE
Status: COMPLETED | OUTPATIENT
Start: 2021-12-03 | End: 2021-12-03

## 2021-12-03 RX ADMIN — IPRATROPIUM BROMIDE AND ALBUTEROL SULFATE 1 AMPULE: .5; 3 SOLUTION RESPIRATORY (INHALATION) at 18:40

## 2021-12-03 RX ADMIN — ALBUTEROL SULFATE 10 MG: 2.5 SOLUTION RESPIRATORY (INHALATION) at 18:48

## 2021-12-03 RX ADMIN — DEXAMETHASONE SODIUM PHOSPHATE 6 MG: 4 INJECTION, SOLUTION INTRA-ARTICULAR; INTRALESIONAL; INTRAMUSCULAR; INTRAVENOUS; SOFT TISSUE at 18:23

## 2021-12-03 ASSESSMENT — ENCOUNTER SYMPTOMS
EYE ITCHING: 0
DIARRHEA: 1
ABDOMINAL DISTENTION: 0
EYE REDNESS: 0
COUGH: 1
SINUS PAIN: 0
ABDOMINAL PAIN: 0
NAUSEA: 0
RHINORRHEA: 0
SINUS PRESSURE: 0
COLOR CHANGE: 0
SHORTNESS OF BREATH: 1
VOMITING: 0
EYE DISCHARGE: 0
CHEST TIGHTNESS: 0

## 2021-12-03 NOTE — ED PROVIDER NOTES
[facility] ENCOUNTER          Pt Name: Beverley Page  MRN: [id number]  [place] 1940  Date of evaluation: 12/3/2021  Treating Resident Physician: Mili Smith DO  Supervising Physician: Stone Montalvo MD    CHIEF COMPLAINT       Chief Complaint   Patient presents with    Shortness of Breath     History obtained from the patient. HISTORY OF PRESENT ILLNESS    HPI  Beverley Page is a 78 y.o. female with a past medical history of CKD, hypertension, hypothyroidism, and suspected CVA who presents to the emergency department for evaluation of difficulty breathing. She is a resident of the [address] after having chronic lower extremity weakness secondary to suspected CVA. She endorses a approximately 1 week history of progressively worsening shortness of breath, intermittent cough productive of mucopurulent sputum, and nasal congestion. She reports that her shortness of breath acutely worsened last night, and this today was noted to have an oxygen saturation of 84% on 4 L O2 nasal cannula at the [facility] home. She chronically uses oxygen at the nursing home, and was increased to 4L O2 nasal cannula today after feeling short of breath and after she was noticed to be hypoxic. She then presented to the emergency department by EMS for further evaluation. Denies fevers, chest pain, loss of taste and smell. She endorses diarrhea which she states is chronic for her and that she has had an EGD for in the past.    The patient has no other acute complaints at this time. REVIEW OF SYSTEMS   Review of Systems   Constitutional: Negative for fever. HENT: Negative for rhinorrhea, sinus pressure and sinus pain. Eyes: Negative for discharge, redness and itching. Respiratory: Positive for cough and shortness of breath. Negative for chest tightness. Cardiovascular: Negative for chest pain.    Gastrointestinal: solution, Inhale 3 mLs into the lungs every 4 hours (while awake) for 5 days, Disp: 360 mL, Rfl:     aspirin 81 MG EC tablet, Take 81 mg by mouth daily , Disp: , Rfl:     potassium chloride (KLOR-CON M) 20 MEQ extended release tablet, Take 20 mEq by mouth daily, Disp: , Rfl:     acetaminophen 650 MG TABS, Take 650 mg by mouth every 4 hours as needed, Disp: 120 tablet, Rfl: 3    folic acid (FOLVITE) 1 MG tablet, Take 1 mg by mouth daily, Disp: , Rfl:     oxybutynin (DITROPAN) 5 MG tablet, Take one tablet by mouth every other day alternating with two tablets by mouth every other day, Disp: , Rfl:     allopurinol (ZYLOPRIM) 100 MG tablet, Take 100 mg by mouth daily, Disp: , Rfl:     citalopram (CELEXA) 20 MG tablet, Take 20 mg by mouth nightly , Disp: , Rfl:       SOCIAL HISTORY     Social History     Social History Narrative    Not on file     Social History     Tobacco Use    Smoking status: Never Smoker    Smokeless tobacco: Never Used   Substance Use Topics    Alcohol use: No    Drug use: No         ALLERGIES   No Known Allergies      FAMILY HISTORY     Family History   Problem Relation Age of Onset    Other Mother     Heart Disease Father          PREVIOUS RECORDS   Previous records reviewed:       PHYSICAL EXAM     ED Triage Vitals   BP Temp Temp src Pulse Resp SpO2 Height Weight   12/03/21 1720 -- -- 12/03/21 1720 12/03/21 1720 12/03/21 1719 12/03/21 1720 12/03/21 1720   (!) 176/85   72 24 (!) 80 % 5' 3\" (1.6 m) 234 lb (106.1 kg)     Initial vital signs and nursing assessment reviewed and abnormal from HTN. Body mass index is 41.45 kg/m². Pulsoximetry is abnormal per my interpretation. Additional Vital Signs:  Vitals:    12/03/21 1929   BP:    Pulse:    Resp: 28   SpO2: 94%       Physical Exam  Vitals reviewed. Constitutional:       General: She is in acute distress. Appearance: She is ill-appearing. HENT:      Head: Normocephalic and atraumatic. Nose: No congestion or rhinorrhea. Mouth/Throat:      Mouth: Mucous membranes are moist.      Pharynx: Oropharynx is clear. No oropharyngeal exudate or posterior oropharyngeal erythema. Eyes:      Extraocular Movements: Extraocular movements intact. Pupils: Pupils are equal, round, and reactive to light. Cardiovascular:      Rate and Rhythm: Normal rate and regular rhythm. Pulses: Normal pulses. Heart sounds: Normal heart sounds. Pulmonary:      Breath sounds: Wheezing present. Comments: Patient with increased respiratory effort, faint wheezes in the right upper lung base, and severely diminished breath sounds in the bilateral lower lung fields. Abdominal:      General: There is no distension. Palpations: Abdomen is soft. Tenderness: There is no abdominal tenderness. Musculoskeletal:         General: No swelling or tenderness. Normal range of motion. Cervical back: Normal range of motion and neck supple. Comments: There is bilateral lower extremity nonpitting edema. Skin:     General: Skin is warm and dry. Neurological:      General: No focal deficit present. Mental Status: She is alert and oriented to person, place, and time. Mental status is at baseline. MEDICAL DECISION MAKING   Initial Assessment:   3 75-year-old female presenting emergency department for evaluation of acute shortness of breath. 2. Differential diagnosis includes was not limited to: COPD exacerbation, COVID-19, flu, ACS, CHF exacerbation.   Plan:    IV, labs   Albuterol and DuoNeb   Decadron for suspected COPD exacerbation   Cardiac work-up   Lactic acid, procalcitonin   ABG        ED RESULTS   Laboratory results:  Labs Reviewed   CBC WITH AUTO DIFFERENTIAL - Abnormal; Notable for the following components:       Result Value    WBC 10.9 (*)     RBC 3.82 (*)     .0 (*)     MCHC 30.6 (*)     RDW-SD 55.7 (*)     Segs Absolute 9.4 (*)     Lymphocytes Absolute 0.9 (*)     Immature Grans (Abs) 0.10 (*)     All other components within normal limits   COMPREHENSIVE METABOLIC PANEL W/ REFLEX TO MG FOR LOW K - Abnormal; Notable for the following components:    BUN 28 (*)     All other components within normal limits   TROPONIN - Abnormal; Notable for the following components:    Troponin T 0.018 (*)     All other components within normal limits   BRAIN NATRIURETIC PEPTIDE - Abnormal; Notable for the following components:    Pro-BNP 4260.0 (*)     All other components within normal limits   BLOOD GAS, ARTERIAL - Abnormal; Notable for the following components:    PCO2 52 (*)     HCO3 30 (*)     Base Excess (Calculated) 4.0 (*)     All other components within normal limits   GLOMERULAR FILTRATION RATE, ESTIMATED - Abnormal; Notable for the following components:    Est, Glom Filt Rate 53 (*)     All other components within normal limits   RAPID INFLUENZA A/B ANTIGENS   COVID-19, RAPID   PROCALCITONIN   LACTATE, SEPSIS   ANION GAP   OSMOLALITY       Radiologic studies results:  XR CHEST PORTABLE   Final Result   Cardiomegaly. No other acute cardiopulmonary disease retrocardiac infiltrates cannot be excluded. **This report has been created using voice recognition software. It may contain minor errors which are inherent in voice recognition technology. **      Final report electronically signed by Dr. Rowdy Frank on 12/3/2021 6:10 PM          ED Medications administered this visit:   Medications   dexamethasone (DECADRON) injection 6 mg (6 mg IntraVENous Given 12/3/21 1823)   ipratropium-albuterol (DUONEB) nebulizer solution 1 ampule (1 ampule Inhalation Given 12/3/21 1840)   albuterol (PROVENTIL) nebulizer solution 10 mg (10 mg Nebulization Given 12/3/21 1848)         ED COURSE     ED Course as of 12/03/21 1955   Fri Dec 03, 2021   1935 Blood Gas, Arterial(!):    pH, Blood Gas 7.38   PCO2 52(!)   pO2 74   HCO3 30(!)   Base Excess (Calculated) 4.0(!)   O2 Sat 94   IFIO2 6   DEVICE Cannula   Tuan Test Positive Source: L Radial   COLLECTED BY: [id number]  Consistent with completely compensated respiratory acidosis [DO]   1937 Pro-BNP(!): 4260.0 [DO]   1937 Troponin T(!): 0.018  Likely secondary to demand ischemia and hypoxia. [DO]   1937 WBC(!): 10.9 [DO]   1938 Procalcitonin: 0.09 [DO]   1938 Flu A Antigen: Negative [DO]   1938 Flu B Antigen: Negative [DO]   1943 Patient reassessed after DuoNeb and albuterol and noted to have improved shortness of breath. Patient still with increased respiratory effort and with wheezes at the lung bases. [DO]   1943 SARS-CoV-2, NAAT: NOT DETECTED [DO]      ED Course User Index  [DO] Eve Elizabeth DO       MEDICATION CHANGES     New Prescriptions    No medications on file         FINAL DISPOSITION     Case was discussed with the admitting hospitalist, Dr. Janell Guidry, who accepted the patient for admission to the hospital. I discussed the need for admission to the hospital with the patient and the patient's family who verbalized understanding and all questions were answered. Final diagnoses:   COPD exacerbation ([address]     Condition: condition: stable  Dispo: Admit to med/surg floor      This transcription was electronically signed. Parts of this transcriptions may have been dictated by use of voice recognition software and electronically transcribed, and parts may have been transcribed with the assistance of an ED scribe. The transcription may contain errors not detected in proofreading. Please refer to my supervising physician's documentation if my documentation differs.     Electronically Signed: Eve Elizabeth DO, 12/03/21, 7:54 PM         Eve Elizabeth DO  Resident  12/03/21 2110

## 2021-12-03 NOTE — ED NOTES
Bed: 016A  Expected date: 12/3/21  Expected time: 4:53 PM  Means of arrival: [id number] [facility]  Comments:     Ana Trimble RN  12/03/21 2150

## 2021-12-03 NOTE — ED NOTES
ED nurse-to-nurse bedside report    Chief Complaint   Patient presents with    Shortness of Breath      LOC: alert and orientated to name, place, date  Vital signs   Vitals:    12/03/21 1719 12/03/21 1720 12/03/21 1843   BP:  (!) 176/85 (!) 163/111   Pulse:  72 84   Resp:  24 28   SpO2: (!) 80% 96% 95%   Weight:  234 lb (106.1 kg)    Height:  5' 3\" (1.6 m)       Pain:    Pain Interventions: NA  Pain Goal: 0  Oxygen: Yes    Current needs required 4LNC   Telemetry: Yes  LDAs:   Peripheral IV 12/03/21 Left Antecubital (Active)   Site Assessment Clean; Dry; Intact 12/03/21 1727   Line Status Flushed 12/03/21 1727   Dressing Status Clean; Dry; Intact 12/03/21 1727     Continuous Infusions:   Mobility: Requires assistance * 1  Dial Fall Risk Score: No flowsheet data found.   Fall Interventions: call light in reach, side rails up x2, family at bedside  Report given to: Yefri Mckeon RN  12/03/21 One Lillian Goddard RN  12/03/21 4517

## 2021-12-03 NOTE — ED NOTES
Pt placed back on 4L NC at this time. Pt respirations even and unlabored. No distress noted.      Karen Jauregui RN  12/03/21 7762

## 2021-12-04 LAB
ANION GAP SERPL CALCULATED.3IONS-SCNC: 13 MEQ/L (ref 8–16)
BACTERIA: ABNORMAL
BILIRUBIN URINE: NEGATIVE
BLOOD, URINE: NEGATIVE
BUN BLDV-MCNC: 32 MG/DL (ref 7–22)
CALCIUM SERPL-MCNC: 9.6 MG/DL (ref 8.5–10.5)
CASTS: ABNORMAL /LPF
CASTS: ABNORMAL /LPF
CHARACTER, URINE: CLEAR
CHLORIDE BLD-SCNC: 108 MEQ/L (ref 98–111)
CO2: 25 MEQ/L (ref 23–33)
COLOR: YELLOW
CREAT SERPL-MCNC: 1.1 MG/DL (ref 0.4–1.2)
CRYSTALS: ABNORMAL
EPITHELIAL CELLS, UA: ABNORMAL /HPF
ERYTHROCYTE [DISTWIDTH] IN BLOOD BY AUTOMATED COUNT: 14.5 % (ref 11.5–14.5)
ERYTHROCYTE [DISTWIDTH] IN BLOOD BY AUTOMATED COUNT: 53 FL (ref 35–45)
GFR SERPL CREATININE-BSD FRML MDRD: 47 ML/MIN/1.73M2
GLUCOSE BLD-MCNC: 169 MG/DL (ref 70–108)
GLUCOSE, URINE: NEGATIVE MG/DL
HCT VFR BLD CALC: 35.4 % (ref 37–47)
HEMOGLOBIN: 11.3 GM/DL (ref 12–16)
KETONES, URINE: ABNORMAL
LEUKOCYTE ESTERASE, URINE: ABNORMAL
LV EF: 58 %
LVEF MODALITY: NORMAL
MCH RBC QN AUTO: 31.9 PG (ref 26–33)
MCHC RBC AUTO-ENTMCNC: 31.9 GM/DL (ref 32.2–35.5)
MCV RBC AUTO: 100 FL (ref 81–99)
MISCELLANEOUS LAB TEST RESULT: ABNORMAL
NITRITE, URINE: POSITIVE
OSMOLALITY CALCULATION: 301.4 MOSMOL/KG (ref 275–300)
OSMOLALITY URINE: 665 MOSMOL/KG (ref 250–750)
PH UA: 6.5 (ref 5–9)
PLATELET # BLD: 173 THOU/MM3 (ref 130–400)
PMV BLD AUTO: 10.4 FL (ref 9.4–12.4)
POTASSIUM REFLEX MAGNESIUM: 4.9 MEQ/L (ref 3.5–5.2)
PROTEIN UA: NEGATIVE MG/DL
RBC # BLD: 3.54 MILL/MM3 (ref 4.2–5.4)
RBC URINE: ABNORMAL /HPF
RENAL EPITHELIAL, UA: ABNORMAL
SODIUM BLD-SCNC: 146 MEQ/L (ref 135–145)
SODIUM URINE: 73 MEQ/L
SPECIFIC GRAVITY UA: 1.02 (ref 1–1.03)
TROPONIN T: 0.02 NG/ML
UROBILINOGEN, URINE: 0.2 EU/DL (ref 0–1)
WBC # BLD: 10 THOU/MM3 (ref 4.8–10.8)
WBC UA: ABNORMAL /HPF
YEAST: ABNORMAL

## 2021-12-04 PROCEDURE — 6370000000 HC RX 637 (ALT 250 FOR IP): Performed by: STUDENT IN AN ORGANIZED HEALTH CARE EDUCATION/TRAINING PROGRAM

## 2021-12-04 PROCEDURE — 6360000002 HC RX W HCPCS: Performed by: INTERNAL MEDICINE

## 2021-12-04 PROCEDURE — 2580000003 HC RX 258: Performed by: INTERNAL MEDICINE

## 2021-12-04 PROCEDURE — 81001 URINALYSIS AUTO W/SCOPE: CPT

## 2021-12-04 PROCEDURE — 36415 COLL VENOUS BLD VENIPUNCTURE: CPT

## 2021-12-04 PROCEDURE — 80048 BASIC METABOLIC PNL TOTAL CA: CPT

## 2021-12-04 PROCEDURE — 2580000003 HC RX 258: Performed by: STUDENT IN AN ORGANIZED HEALTH CARE EDUCATION/TRAINING PROGRAM

## 2021-12-04 PROCEDURE — 2140000000 HC CCU INTERMEDIATE R&B

## 2021-12-04 PROCEDURE — 94761 N-INVAS EAR/PLS OXIMETRY MLT: CPT

## 2021-12-04 PROCEDURE — 6360000002 HC RX W HCPCS: Performed by: STUDENT IN AN ORGANIZED HEALTH CARE EDUCATION/TRAINING PROGRAM

## 2021-12-04 PROCEDURE — 94640 AIRWAY INHALATION TREATMENT: CPT

## 2021-12-04 PROCEDURE — 6370000000 HC RX 637 (ALT 250 FOR IP): Performed by: HOSPITALIST

## 2021-12-04 PROCEDURE — 99233 SBSQ HOSP IP/OBS HIGH 50: CPT | Performed by: INTERNAL MEDICINE

## 2021-12-04 PROCEDURE — 85027 COMPLETE CBC AUTOMATED: CPT

## 2021-12-04 PROCEDURE — 84300 ASSAY OF URINE SODIUM: CPT

## 2021-12-04 PROCEDURE — 83935 ASSAY OF URINE OSMOLALITY: CPT

## 2021-12-04 PROCEDURE — 92610 EVALUATE SWALLOWING FUNCTION: CPT

## 2021-12-04 PROCEDURE — 93306 TTE W/DOPPLER COMPLETE: CPT

## 2021-12-04 RX ORDER — LANOLIN ALCOHOL/MO/W.PET/CERES
1.5 CREAM (GRAM) TOPICAL NIGHTLY PRN
Status: DISCONTINUED | OUTPATIENT
Start: 2021-12-04 | End: 2021-12-11 | Stop reason: HOSPADM

## 2021-12-04 RX ORDER — SODIUM CHLORIDE 0.9 % (FLUSH) 0.9 %
5-40 SYRINGE (ML) INJECTION EVERY 12 HOURS SCHEDULED
Status: DISCONTINUED | OUTPATIENT
Start: 2021-12-04 | End: 2021-12-11 | Stop reason: HOSPADM

## 2021-12-04 RX ORDER — SODIUM CHLORIDE 0.9 % (FLUSH) 0.9 %
10 SYRINGE (ML) INJECTION PRN
Status: DISCONTINUED | OUTPATIENT
Start: 2021-12-04 | End: 2021-12-11 | Stop reason: HOSPADM

## 2021-12-04 RX ORDER — FUROSEMIDE 10 MG/ML
20 INJECTION INTRAMUSCULAR; INTRAVENOUS DAILY
Status: DISCONTINUED | OUTPATIENT
Start: 2021-12-04 | End: 2021-12-07

## 2021-12-04 RX ORDER — PREDNISONE 20 MG/1
40 TABLET ORAL DAILY
Status: DISCONTINUED | OUTPATIENT
Start: 2021-12-04 | End: 2021-12-07

## 2021-12-04 RX ORDER — SODIUM CHLORIDE 9 MG/ML
INJECTION, SOLUTION INTRAVENOUS CONTINUOUS
Status: DISCONTINUED | OUTPATIENT
Start: 2021-12-04 | End: 2021-12-04

## 2021-12-04 RX ADMIN — SODIUM CHLORIDE, PRESERVATIVE FREE 10 ML: 5 INJECTION INTRAVENOUS at 21:30

## 2021-12-04 RX ADMIN — ACETAMINOPHEN 650 MG: 325 TABLET ORAL at 21:29

## 2021-12-04 RX ADMIN — ACETAMINOPHEN 650 MG: 325 TABLET ORAL at 01:32

## 2021-12-04 RX ADMIN — AMLODIPINE BESYLATE 5 MG: 5 TABLET ORAL at 10:10

## 2021-12-04 RX ADMIN — ALLOPURINOL 100 MG: 100 TABLET ORAL at 10:09

## 2021-12-04 RX ADMIN — IPRATROPIUM BROMIDE AND ALBUTEROL SULFATE 1 AMPULE: .5; 3 SOLUTION RESPIRATORY (INHALATION) at 20:47

## 2021-12-04 RX ADMIN — METOLAZONE 5 MG: 5 TABLET ORAL at 10:09

## 2021-12-04 RX ADMIN — SODIUM CHLORIDE, PRESERVATIVE FREE 10 ML: 5 INJECTION INTRAVENOUS at 01:32

## 2021-12-04 RX ADMIN — FAMOTIDINE 20 MG: 20 TABLET ORAL at 01:32

## 2021-12-04 RX ADMIN — ASPIRIN 81 MG: 81 TABLET, COATED ORAL at 10:10

## 2021-12-04 RX ADMIN — ENOXAPARIN SODIUM 40 MG: 100 INJECTION SUBCUTANEOUS at 10:09

## 2021-12-04 RX ADMIN — Medication 1.5 MG: at 21:28

## 2021-12-04 RX ADMIN — SODIUM CHLORIDE, PRESERVATIVE FREE 10 ML: 5 INJECTION INTRAVENOUS at 10:33

## 2021-12-04 RX ADMIN — CEFTRIAXONE SODIUM 1000 MG: 1 INJECTION, POWDER, FOR SOLUTION INTRAMUSCULAR; INTRAVENOUS at 13:44

## 2021-12-04 RX ADMIN — POTASSIUM CHLORIDE 20 MEQ: 1500 TABLET, EXTENDED RELEASE ORAL at 10:10

## 2021-12-04 RX ADMIN — FAMOTIDINE 20 MG: 20 TABLET ORAL at 21:28

## 2021-12-04 RX ADMIN — IPRATROPIUM BROMIDE AND ALBUTEROL SULFATE 1 AMPULE: .5; 3 SOLUTION RESPIRATORY (INHALATION) at 07:50

## 2021-12-04 RX ADMIN — LEVOTHYROXINE SODIUM 175 MCG: 0.15 TABLET ORAL at 10:31

## 2021-12-04 RX ADMIN — FUROSEMIDE 20 MG: 40 INJECTION, SOLUTION INTRAMUSCULAR; INTRAVENOUS at 13:14

## 2021-12-04 RX ADMIN — METOPROLOL TARTRATE 100 MG: 100 TABLET, FILM COATED ORAL at 21:28

## 2021-12-04 RX ADMIN — LISINOPRIL 10 MG: 10 TABLET ORAL at 10:09

## 2021-12-04 RX ADMIN — IPRATROPIUM BROMIDE AND ALBUTEROL SULFATE 1 AMPULE: .5; 3 SOLUTION RESPIRATORY (INHALATION) at 16:42

## 2021-12-04 RX ADMIN — PREDNISONE 40 MG: 20 TABLET ORAL at 10:10

## 2021-12-04 RX ADMIN — FOLIC ACID 1 MG: 1 TABLET ORAL at 10:10

## 2021-12-04 RX ADMIN — METOPROLOL TARTRATE 100 MG: 100 TABLET, FILM COATED ORAL at 01:32

## 2021-12-04 RX ADMIN — IPRATROPIUM BROMIDE AND ALBUTEROL SULFATE 1 AMPULE: .5; 3 SOLUTION RESPIRATORY (INHALATION) at 13:31

## 2021-12-04 RX ADMIN — METOPROLOL TARTRATE 100 MG: 100 TABLET, FILM COATED ORAL at 10:10

## 2021-12-04 RX ADMIN — SODIUM CHLORIDE: 9 INJECTION, SOLUTION INTRAVENOUS at 06:38

## 2021-12-04 ASSESSMENT — PAIN SCALES - GENERAL
PAINLEVEL_OUTOF10: 0
PAINLEVEL_OUTOF10: 0
PAINLEVEL_OUTOF10: 3

## 2021-12-04 NOTE — PROGRESS NOTES
[facility]  SPEECH THERAPY  STRZ CCU-STEPDOWN 3B  Clinical Swallow Evaluation      SLP Individual Minutes  Time In:   Time Out: 1021  Minutes: 10  Timed Code Treatment Minutes: 0 Minutes       Date: 2021  Patient Name: Valjean Hodgkins      CSN: [id number]   : 1940  (78 y.o.)  Gender: female   Referring Physician: Chinyere Zapien MD  Diagnosis: COPD exacerbation   Secondary Diagnosis: Dysphagia   History of Present Illness/Injury: Patient admitted with above diagnosis and shortness of breath. COVID-19 not detected. No abnormalities revealed on CXR. ST consulted to assess swallow function to determine safest diet level and POC. Past Medical History:   Diagnosis Date    Arthritis     both hands    CAD (coronary artery disease)     Cerebral artery occlusion with cerebral infarction ([address]     Chronic kidney disease     COPD exacerbation ([facility] [address] 12/3/2021    GERD (gastroesophageal reflux disease)     History of blood transfusion     Hyperlipidemia     Hypertension     Movement disorder     Pneumonia     Thyroid disease        SUBJECTIVE:  RN April with approval to complete clinical swallow evaluation, reports concern for pulmonary compromise following meal consumption. Also indicates presence of crackles in left lung base. OBJECTIVE:    Pain:  No pain reported. Current Diet: Regular, thin liquids     Respiratory Status:  Nasal Canula - 5L    Behavioral Observation:  Alert and Oriented    Oral Mechanism Evaluation:      Facial / Labial WFL    Lingual WFL    Dentition WFL    Velum WFL    Vocal Quality WFL    Sensation WFL    Cough Impaired Dry, nonproductive     Patient Evaluated Using:   Thin liquids via cup and straw, puree, soft solids, coarse solids     Oral Phase:  Impaired:  Impaired Mastication and Reduced Bolus Formation    Pharyngeal Phase: Impaired:  Decreased Hyolaryngeal Elevation    Signs and Symptoms of Laryngeal Penetration/Aspiration: Delayed Cough and Change in Pulmonary Status (spO2 drop to 88/89% from 93%)    Impresssions: Patient presents with mild oral dysphagia with inability to fully discern potential presence of pharyngeal phase deficits without formal instrumentation. Difficulty fully masticating coarse solids with patient stating \"it just cant be chewed up\", despite appropriate bolus control and formation. Min residuals on base of tongue to follow. Trials of thin liquids completed with ability to clear residuals, timely swallow trigger also suspected to be intact. Following ALL trials, patient with delayed, dry coughing. Unable to determine if related to PO intake or SOB/COPD. Certainly cannot ruleout invasive airway events at bedside alone. Of note, patient also with hx of GERD and esophageal dilatation, though does not \"feel like things are stuck\" at this time. Recommend downgrade to soft and bite sized diet with resumption of thin liquids at this time. Plans for completion of MBS on Monday 12/6 (per fluoro availability, pending patient remains inpatient status) to fully assess pharyngeal function. *should patient be discharged prior to Monday 12/6, would highly recommend patient receive follow up skilled ST services via ECF (previous) setting to assist with dysphagia management      RECOMMENDATIONS/ASSESSMENT:  Instrumental Evaluation: Modified Barium Swallow (MBS) to be completed Monday 12/6/21 pending patient still inpatient status. Should not hold discharge, given patient with overall safe consumption of modified diet and clear CXR. Diet Recommendations:  Soft and bite sized, thin liquids   Strategies:  Full Upright Position, Small Bite/Sip and Pulmonary Monitoring   Rehabilitation Potential: fair.     EDUCATION:  Learner: Patient and RN April  Education:  Reviewed results and recommendations of this evaluation, Reviewed diet and strategies, Reviewed signs, symptoms and risks of aspiration, Reviewed ST goals and Plan of Care, Reviewed recommendations for follow-up and Education Related to Potential Risks and Complications Due to Impairment/Illness/Injury  Evaluation of Education: Verbalizes understanding, Demonstrates with assistance and Family not present    PLAN:  Skilled SLP intervention on acute care 3-5 x per week or until goals met and/or pt plateaus in function. Specific interventions for next session may include: MBS or dietary analysis with compensatory strategy training. PATIENT GOAL:    Did not state. Will further assess during treatment. SHORT TERM GOALS:  Short-term Goals  Timeframe for Short-term Goals: 2 weeks  Goal 1: Patient will safely consume a soft and bite sized diet + thin liquids without overt s/s of aspiration to maximize nutrition/hydration levels. Goal 2: Complete MBS to fully assess pharyngeal function. LONG TERM GOALS:  No LTG due to short ELOS.        Aaliyah Holley M.S. [id number] [facility] [id number]

## 2021-12-04 NOTE — ED NOTES
ED nurse-to-nurse bedside report    Chief Complaint   Patient presents with    Shortness of Breath      LOC: alert and orientated to name, place, date  Vital signs   Vitals:    12/03/21 1924 12/03/21 1929 12/03/21 2012 12/03/21 2022   BP: (!) 146/87  (!) 150/87    Pulse: 87  100 92   Resp: (!) 33 28 26 (!) 32   SpO2: 91% 94% 91% 94%   Weight:       Height:          Pain:    Pain Interventions: na  Pain Goal: na  Oxygen: Yes    Current needs required na   Telemetry: Yes  LDAs:   Peripheral IV 12/03/21 Left Antecubital (Active)   Site Assessment Clean; Dry; Intact 12/03/21 1727   Line Status Flushed 12/03/21 1727   Dressing Status Clean; Dry; Intact 12/03/21 1727     Continuous Infusions:   Mobility: Requires assistance * 2  Dial Fall Risk Score: No flowsheet data found. Fall Interventions: call light  Report given to: floor  Pt on 4 L O2-this is what she wears at home.      [facility]	[place], RN  12/03/21 2023

## 2021-12-04 NOTE — CARE COORDINATION
12/4/21, 10:30 AM EST    DISCHARGE PLANNING EVALUATION    Spoke with Madison Cronin. She reports she would like to return to [facility] [address] [place] [address] at discharge. Called and left a message for Willian Carpenter in admissions at the facility and for daughter Ortega Mccloud. 12/4/21, 12:55 PM EST  Discharge Planning Evaluation  Social work consult received, patient from [facility]. Patient/Family preference is to return to [address] [place] [address]. The patient's current payor source at the facility is Medicaid. Medicare skilled days available: yes  Insurance precert:  no  Spoke with Willian Carpenter at the facility. Patient bed hold: yes  Anticipated transport plan: ambulance  Do they require COVID 19 test to return to [facility]   Is there a required time frame which which COVID test needs done:with in 48 hours of discharge.

## 2021-12-04 NOTE — DISCHARGE INSTR - COC
fibrillation (HCC) I48.91    CKD (chronic kidney disease), stage III ([facility]) N18.30    Intraventricular hemorrhage (HCC) I61.5    Scalp abrasion S00. 01XA    Acute on chronic diastolic heart failure ([facility]) I50.33    COPD exacerbation ([facility]) J44.1       Isolation/Infection:   Isolation            No Isolation          Patient Infection Status       Infection Onset Added Last Indicated Last Indicated By Review Planned Expiration Resolved Resolved By    None active    Resolved    COVID-19 (Rule Out) 12/03/21 12/03/21 12/03/21 COVID-19, Rapid (Ordered)   12/03/21 Rule-Out Test Resulted            Nurse Assessment:  Last Vital Signs: BP (!) 147/82   Pulse 79   Temp 97.7 °F (36.5 °C) (Oral)   Resp 22   Ht 5' 3\" (1.6 m)   Wt 221 lb 9 oz (100.5 kg)   SpO2 91%   BMI 39.25 kg/m²     Last documented pain score (0-10 scale): Pain Level: 0  Last Weight:   Wt Readings from Last 1 Encounters:   12/04/21 221 lb 9 oz (100.5 kg)     Mental Status:   confused at tmes    IV Access:  - None    Nursing Mobility/ADLs:  Walking   Dependent  Transfer  Dependent  Bathing  Dependent  Dressing  Dependent  Toileting  Dependent  Feeding  Dependent  Med Admin  Dependent  Med Delivery   crushed    Wound Care Documentation and Therapy:  Wound 02/13/20 Head Left;Upper abrasion and bruising (Active)   Number of days: 659       Wound 02/13/20 Coccyx blanches, purple (Active)   Number of days: 659        Elimination:  Continence: Bowel: No  Bladder: No  Urinary Catheter: None   Colostomy/Ileostomy/Ileal Conduit: No       Date of Last BM:12/08/2021      Intake/Output Summary (Last 24 hours) at 12/4/2021 1033  Last data filed at 12/4/2021 0648  Gross per 24 hour   Intake 10 ml   Output 100 ml   Net -90 ml     I/O last 3 completed shifts:   In: 10 [I.V.:10]  Out: 100 [Urine:100]    Safety Concerns:     Sund[employer] [place]    Impairments/Disabilities:      None    Nutrition Therapy:  Current Nutrition Therapy:   - Oral Diet:  honey thick liquids    Routes of Feeding: Oral  Liquids: Honey Thick Liquids  Daily Fluid Restriction: no  Last Modified Barium Swallow with Video (Video Swallowing Test): 12/11/2021    Treatments at the Time of Hospital Discharge:   Respiratory Treatments: na  Oxygen Therapy:  is on oxygen at 3 L/min per nasal cannula. Ventilator:    - No ventilator support    Rehab Therapies: Physical Therapy; Speech Therapy  Weight Bearing Status/Restrictions: No weight bearing restirctions  Other Medical Equipment (for information only, NOT a DME order):  wheelchair  Other Treatments: na    Patient's personal belongings (please select all that are sent with patient):  Glasses    RN SIGNATURE:  Electronically signed by Shobha Feliciano RN on 12/10/21 at 11:15 AM EST    CASE MANAGEMENT/SOCIAL WORK SECTION    Inpatient Status Date: 12/3/2021    Readmission Risk Assessment Score:  Readmission Risk              Risk of Unplanned Readmission:  16           Discharging to Facility/ Agency   Name: [facility], [facility], [address] [facility]  [facility] (if applicable)   Name:  Address:  Dialysis Schedule:  Phone:  Fax:    / signature: Electronically signed by HARVEY Cho on 12/4/21 at 10:34 AM EST    PHYSICIAN SECTION    Prognosis: Fair    Condition at Discharge: Stable    Rehab Potential (if transferring to Rehab): Fair    Recommended Labs or Other Treatments After Discharge: CBC BMP    Physician Certification: I certify the above information and transfer of Mallory Conte  is necessary for the continuing treatment of the diagnosis listed and that she requires [facility] for greater 30 days.      Update Admission H&P: No change in H&P    PHYSICIAN SIGNATURE:  Electronically signed by Aly Linton MD on 12/11/21 at 3:05 PM EST

## 2021-12-04 NOTE — RT PROTOCOL NOTE
RT Inhaler-Nebulizer Bronchodilator Protocol Note    There is a bronchodilator order in the chart from a provider indicating to follow the RT Bronchodilator Protocol and there is an Initiate RT Inhaler-Nebulizer Bronchodilator Protocol order as well (see protocol at bottom of note). CXR Findings:  XR CHEST PORTABLE    Result Date: 12/3/2021  Cardiomegaly. No other acute cardiopulmonary disease retrocardiac infiltrates cannot be excluded. **This report has been created using voice recognition software. It may contain minor errors which are inherent in voice recognition technology. ** Final report electronically signed by Dr. Carrie Escudero on 12/3/2021 6:10 PM      The findings from the last RT Protocol Assessment were as follows:   History Pulmonary Disease: None or smoker <15 pack years  Respiratory Pattern: Mild dyspnea at rest, irregular pattern, or RR 21-25 bpm  Breath Sounds: Inspiratory and expiratory or bilateral wheezing and/or rhonchi  Cough: Strong, productive  Indication for Bronchodilator Therapy: Wheezing associated with pulm disorder  Bronchodilator Assessment Score: 11    Aerosolized bronchodilator medication orders have been revised according to the RT Inhaler-Nebulizer Bronchodilator Protocol below. Respiratory Therapist to perform RT Therapy Protocol Assessment initially then follow the protocol. Repeat RT Therapy Protocol Assessment PRN for score 0-3 or on second treatment, BID, and PRN for scores above 3. No Indications - adjust the frequency to every 6 hours PRN wheezing or bronchospasm, if no treatments needed after 48 hours then discontinue using Per Protocol order mode. If indication present, adjust the RT bronchodilator orders based on the Bronchodilator Assessment Score as indicated below.   Use Inhaler orders unless patient has one or more of the following: on home nebulizer, not able to hold breath for 10 seconds, is not alert and oriented, cannot activate and use MDI correctly, or respiratory rate 25 breaths per minute or more, then use the equivalent nebulizer order(s) with same Frequency and PRN reasons based on the score. If a patient is on this medication at home then do not decrease Frequency below that used at home. 0-3 - enter or revise RT bronchodilator order(s) to equivalent RT Bronchodilator order with Frequency of every 4 hours PRN for wheezing or increased work of breathing using Per Protocol order mode. 4-6 - enter or revise RT Bronchodilator order(s) to two equivalent RT bronchodilator orders with one order with BID Frequency and one order with Frequency of every 4 hours PRN wheezing or increased work of breathing using Per Protocol order mode. 7-10 - enter or revise RT Bronchodilator order(s) to two equivalent RT bronchodilator orders with one order with TID Frequency and one order with Frequency of every 4 hours PRN wheezing or increased work of breathing using Per Protocol order mode. 11-13 - enter or revise RT Bronchodilator order(s) to one equivalent RT bronchodilator order with QID Frequency and an Albuterol order with Frequency of every 4 hours PRN wheezing or increased work of breathing using Per Protocol order mode. Greater than 13 - enter or revise RT Bronchodilator order(s) to one equivalent RT bronchodilator order with every 4 hours Frequency and an Albuterol order with Frequency of every 2 hours PRN wheezing or increased work of breathing using Per Protocol order mode. RT to enter RT Home Evaluation for COPD & MDI Assessment order using Per Protocol order mode.     Electronically signed by Micheline Finn RCP on 12/4/2021 at 8:03 AM

## 2021-12-04 NOTE — ED PROVIDER NOTES
[address]  EMERGENCY MEDICINE ATTENDING ATTESTATION      Evaluation of Chantal Richards. Case discussed and care plan developed with resident physician. I agree with the resident physician documentation and plan as documented by him, except if my documentation differs. Patient seen, interviewed and examined by me. I reviewed the medical, surgical, family and social history, medications and allergies. I have reviewed the nursing documentation. Brief H&P   Patient c/o worsening shortness of breath, nasal congestion. Patient noted to have low oxygen saturation. Physical exam is notable for well appearing obese female, speaks full sentences but appears short of breath without respiratory distress. RRR, normal S1/S2, No M/R/G. BS present and normal bilaterally, no ronchi or rales. Diffuse bilateral wheezing. Bilateral leg edema, right lower extremity on brace. Medical Decision Making   MDM:   1. COPD exacerbation  2. Rule out COVID-19  3. Rule out pneumonia  Plan:    I V line, labs   Nebulized medication   Steroids   Imaging   EKG   Observation    Please see the resident physician completed note for final disposition except as documented on this attestation. I have reviewed and interpreted all available lab, radiology and ekg results available at the moment. Diagnosis, treatment and disposition plans were discussed and agreed upon by patient. This transcription was electronically signed. It was dictated by use of voice recognition software and electronically transcribed. The transcription may contain errors not detected in proofreading.      I performed direct supervision and was present for the critical portion following procedures: None  Critical care time on this case: None    Electronically signed by Amaya Acosta MD on 12/3/21 at 7:18 PM EST       Amaya Acosta MD  12/03/21 3991

## 2021-12-04 NOTE — H&P
Hospitalist - History & Physical      Patient: Nam Gotti    Unit/Bed:16/016A  YOB: 1940  MRN: [id number]   Acct: [de-identified]   PCP: Cheryl Zimmerman DO    Date of Service: Pt seen/examined on 12/03/21  and Admitted to Observation with expected LOS less than two midnights due to medical therapy. Chief Complaint: Shortness of Breath    Assessment and Plan:    #Acute on Chronic Hypoxic respiratory Failure, multifactorial etiology, likely 2/2 CHFe/COPDe: Pt presented w/worsening of her chronic hypoxic RF, 2L-4LNC requirement normally. Currently on 6LNC w/sats>90%, pre-test probability for PE unremarkable. Known Diastolic HF, Moderate PHT, and undiagnosed COPD. Component of JOHNIE as well given her PHT, HTN, bodily habitus, difficulty sleeping at night?? No PSG on file either, can be completed as an OP.   - Continue to wean as tolerated w/goal O2sats >90%  - DuoNebs and IS at bedside when needed  - Also concern for Pulmonary edema 2/2 to underlying CHF/PHT contributing to cardiogenic pulmonary edema ->if pt continues to have unchanged respiratory requirements, can diurese her as fluid should resolve faster than infection    #? COPDe likely 2/2 CHFe, less likely infection; Component of ILD?: No official PFTs on file at this time w/multiple attempts to evaluate. Previous CT chests demonstrate cardiomegaly and fibro-emphysematous changes. Previously noted to have mild PHT (group III?) likely contributing to presentation. Received Decadron 6mg in ED along with DuoNebs. Pt admits to copious mucopurulent sputum production but states it's not a/w cough, more so when she eats. - C/w Prednisone 40mg Daily x5days for suspected COPDe  - C/w treatments as outlined in the above AoCHRF  - Will need PFTs/PSG as an OP on d/c  - No Abx started in ED as it appears unlikely pt has an infection - can consider if pt does not improve and continually requires     #? Acute on Chronic HFpEF, EF 55% 2018, G2DD: No current Echo, b/l LE edema, obese and not definitive evaluation of edema in hips, etc.. No apparent JVD. Pro-BNP 4260, 2x the baseline values previously. Taking Lopressor for A-fib, Lisinopril, and Zaroxolyn at home. Unsure of current LV function, after Echo can reevaluate the need for adjusted GDMT. - Ordered 2D Echo will f/u when complete    #Elevated troponins likely 2/2 to demand ischemia; Hx of CAD: Elevated on admission 0.018, acute CHF? Pt does not appear volume down at this time and BP was elevated on arrival. HTN, A-fib, and CHF/COPD contributing to myocardial injury?   - Will order another troponin to trend direction  - EKG demonstrates A-fib, LAD, q-waves in Inferior and anteroseptal leads, no signs of Ischemia   - Has a Hx of CAD, stable on ASA, could theoretically stop ASA and continue w/Eliquis given her A-fib (AFIRE trial)    #Chronic Atrial Fib not on [address] 2/2 IVH in 2020: Follows with Dr. Merlynn Cockayne; telemetry showing in atrial fibrillation with a controlled ventricular rate; Pt is on Lopressor 100mg BID. Eliquis was stopped after IVH due to mechanical fall in 2020. Was supposed to discuss w/PCP and  on whether to restart or not, given the risk for strokes. [id number] score 7 (Stroke/TIA/Systemic embolism risk of 11.2-15.7%/year) and HAS-BLED score at least 4, High risk of Bleeding. Benefits-to-risks discussion can be had w/pt again. Hx of SVT s/p ablation.   - Candidate for Ablation or Watchman?   - EKG continues to show A-fib w/adequate ventricular response  - C/w Lopressor 100mg BID     #CKD Stage IIIa w/baseline Cr: Hx of CKD and episodes of TYRON/UTIs in the past. Has been seen by Nephrology in clinic. Seems at Baseline, continue to monitor fluid status for need of diuretics vs. Fluids. #Essential hypertension, uncontrolled: Pt is on Lopressor, Zaroxolyn, lisinopril, Norvasc; monitor for lability. Can adjust as needed based on current BP. Hold parameters on medications.      #Hx of CVA at 20yo (Subjective) and IVH 2020: Residual chronic right-sided weakness from a previous of stroke that occurred more than 50 years ago. She does use a walker at home to ambulate and wears a leg brace on her right leg. Mechanical fall in 2020 -> IVH, seen by Neuro    #Hx of Hypothyroidism: C/w home Synthroid    #Hx of GERD: C/w Home Pepcid, no PPI given her hx of CKD and recurrent TYRON    #Hx of Anxiety: C/w home Celexa    History Of Present Illness:      Davey Kawasaki is a 78 y.o. female w/siginificant PMHx of HTN, CAD, A fib not on [address], HLD, CKD, hx of CVA w/residual b/l LE weakness, Intracranial hemorrhage 2/2 to mechanical fall in 2020, Hypothyroidism, COPD, and chronic hypoxic respiratory failure who presented to the [facility] ED via EMS from the [address] [place] [address] for progressively worsening SOB, nasal congestion, non-productive cough, and mucopurulent sputum over the course of approximately 1 week. Pt states that she began having a greater difficulty breathing last night that acutely worsened her SOB to where she felt air hungry and anxious. Pt normally on [facility] and was increased to [facility] for the SOB and was noted to have an O2 sat of 84% at the NH while this incident was occurring. Pt denies CP, abdominal pain, fever/chills/rigors, N/V.     ED course: Presented via EMS, O2 sats in 80's on RA 4LNC O2 sat 84%, placed on NRB at 10L then weaned back down to 4LNC, /85, RR 24. Noted to have Trops x1 @ 0.018, pro-BNP 4260, ABG demonstrates pH 7.38, pCO2 52, pO2 74, HCO3 30 Respiratory Acidosis w/full metabolic compensation. COVID-19 and Influenza A/B negative, procal 0.09, WBC 10.9, LA wnl. Received Decadron and DuoNebs in ED. Being admitted for further evaluation and w/u. Of note, pt becoming a little dry, Na increasing, BUN and Cr increasing, Serum Osm >300, GFR decreasing, ordered urine Na / Osm and started pt on NS @75cc/hr.  Pt appears to be have a sensitive and labile fluid status and don't want to aggravate her HTN as well. Will continue to monitor. Pt weaned down to [place] w/sats >92% at this time. Past Medical History:  has a past medical history of Arthritis, CAD (coronary artery disease), Cerebral artery occlusion with cerebral infarction ([facility] [address] Chronic kidney disease, COPD exacerbation ([facility] [address] GERD (gastroesophageal reflux disease), History of blood transfusion, Hyperlipidemia, Hypertension, Movement disorder, Pneumonia, and Thyroid disease. Past Surgical History:  has a past surgical history that includes joint replacement; Colonoscopy; EKG 12 Lead (8/26/2015); Cholecystectomy, laparoscopic (04/24/2017); and Hysterectomy. Home Medications:   No current facility-administered medications on file prior to encounter.      Current Outpatient Medications on File Prior to Encounter   Medication Sig Dispense Refill    metoprolol (LOPRESSOR) 100 MG tablet Take 100 mg by mouth 2 times daily      vitamin E 400 UNIT capsule Take 400 Units by mouth nightly      amLODIPine (NORVASC) 5 MG tablet Take 5 mg by mouth daily      melatonin 1 MG tablet Take 1 mg by mouth nightly as needed for Sleep      metOLazone (ZAROXOLYN) 5 MG tablet Take 5 mg by mouth daily      levothyroxine (SYNTHROID) 175 MCG tablet Take 175 mcg by mouth Daily      rosuvastatin (CRESTOR) 5 MG tablet Take 5 mg by mouth daily      lisinopril (PRINIVIL;ZESTRIL) 10 MG tablet Take 10 mg by mouth daily      famotidine (PEPCID) 20 MG tablet Take 1 tablet by mouth nightly 30 tablet 5    ipratropium-albuterol (DUONEB) 0.5-2.5 (3) MG/3ML SOLN nebulizer solution Inhale 3 mLs into the lungs every 4 hours (while awake) for 5 days 360 mL     aspirin 81 MG EC tablet Take 81 mg by mouth daily       potassium chloride (KLOR-CON M) 20 MEQ extended release tablet Take 20 mEq by mouth daily      acetaminophen 650 MG TABS Take 650 mg by mouth every 4 hours as needed 896 tablet 3    folic acid (FOLVITE) 1 MG tablet Take 1 mg by mouth daily      oxybutynin (DITROPAN) 5 MG tablet Take one tablet by mouth every other day alternating with two tablets by mouth every other day      allopurinol (ZYLOPRIM) 100 MG tablet Take 100 mg by mouth daily      citalopram (CELEXA) 20 MG tablet Take 20 mg by mouth nightly          Allergies:    Patient has no known allergies. Social History:  reports that she has never smoked. She has never used smokeless tobacco. She reports that she does not drink alcohol and does not use drugs. Family History: family history includes Heart Disease in her father; Other in her mother. Diet:  ADULT DIET; Regular; Low Sodium (2 gm); 2000 ml    Review of systems:   Pertinent positives as noted in the HPI. All other systems reviewed and negative. PHYSICAL EXAM:   height is 5' 3\" (1.6 m) and weight is 234 lb (106.1 kg). Her blood pressure is 145/89 (abnormal) and her pulse is 86. Her respiration is 21 and oxygen saturation is 94%. Body mass index is 41.45 kg/m². Constitutional: In mild acute distress. Patient is oriented to person, place, and time. Ill-appearing  Head: Normocephalic and atraumatic. Mouth/Throat: Oropharynx is clear and moist.  No oral thrush. Eyes: Conjunctivae are normal. Pupils are equal, round, and reactive to light. No scleral icterus. Neck: Neck supple. No JVD present. No tracheal deviation present. Cardiovascular: Normal rate, regular rhythm, normal heart sounds. No murmur heard. Pulmonary/Chest: Patient exhibits no tenderness. Mild respiratory distress, diffuse wheezing b/l, Bibasilar diminished lung sounds  Abdominal: Soft. Patient exhibits no distension. No tenderness. Bowel sounds present. Musculoskeletal: Normal range of motion. Extremities: Patient exhibits no tenderness. B/l LE edema   Lymphadenopathy: No cervical adenopathy. Neurological: Patient is alert and oriented to person, place, and time. Skin: Skin is warm and dry. Patient is not diaphoretic.  No rashes or lesions. Psychiatric: Patient has a normal mood and affect.  Patient behavior is normal.         Electronically signed by Yudy Langston M.D. on 12/3/2021 at 11:23 PM

## 2021-12-04 NOTE — FLOWSHEET NOTE
12/04/21 1022   Provider Notification   Reason for Communication Review case   Provider Name Dr. Sukhdeep Harrell   Provider Notification Physician   Method of Communication Secure Message  (crackles, .9 running at 75 mls per hour)   Response See orders   Notification Time [id number]

## 2021-12-04 NOTE — PROGRESS NOTES
[facility]    Progress Note    12/4/2021    10:55 AM    Name:   Terrence Sanchez  MRN:     [id number]     [place]:      [de-identified]   Room:   [address] [place], [place] [address]A   Day:  1  Admit Date:  12/3/2021  5:04 PM    PCP:   Bao Flores DO  Code Status:  Limited    Subjective:     C/C:   Chief Complaint   Patient presents with    Shortness of Breath     Interval History Status: not changed. Patient was seen and evaluated at bedside this morning. Patient reports that her breathing is about the same as yesterday. Patient denies any new complaint at this time. Brief History:     78-year-old female who was admitted with worsening shortness of breath. Patient was noted to be in CHF and COPD exacerbation. Review of Systems:     Constitutional:  negative for chills, fevers, sweats  Respiratory:  negative for cough, dyspnea on exertion, shortness of breath, wheezing  Cardiovascular:  negative for chest pain, chest pressure/discomfort, lower extremity edema, palpitations  Gastrointestinal:  negative for abdominal pain, constipation, diarrhea, nausea, vomiting  Neurological:  negative for dizziness, headache    Medications:      Allergies:  No Known Allergies    Current Meds:   Scheduled Meds:    levothyroxine  175 mcg Oral Daily    sodium chloride flush  5-40 mL IntraVENous 2 times per day    predniSONE  40 mg Oral Daily    furosemide  20 mg IntraVENous Daily    cefTRIAXone (ROCEPHIN) IV  1,000 mg IntraVENous Q24H    allopurinol  100 mg Oral Daily    amLODIPine  5 mg Oral Daily    aspirin  81 mg Oral Daily    citalopram  20 mg Oral Nightly    famotidine  20 mg Oral Nightly    folic acid  1 mg Oral Daily    lisinopril  10 mg Oral Daily    metOLazone  5 mg Oral Daily    metoprolol  100 mg Oral BID    potassium chloride  20 mEq Oral Daily    rosuvastatin  5 mg Oral Daily    enoxaparin  40 mg SubCUTAneous Daily    ipratropium-albuterol  1 ampule Inhalation Q4H WA     Continuous Infusions:    sodium chloride       PRN Meds: sodium chloride flush, melatonin, sodium chloride, ondansetron **OR** ondansetron, polyethylene glycol, acetaminophen **OR** acetaminophen    Data:     Past Medical History:   has a past medical history of Arthritis, CAD (coronary artery disease), Cerebral artery occlusion with cerebral infarction ([facility] [address] Chronic kidney disease, COPD exacerbation ([facility] [address] GERD (gastroesophageal reflux disease), History of blood transfusion, Hyperlipidemia, Hypertension, Movement disorder, Pneumonia, and Thyroid disease. Social History:   reports that she has never smoked. She has never used smokeless tobacco. She reports that she does not drink alcohol and does not use drugs. Family History:   Family History   Problem Relation Age of Onset    Other Mother     Heart Disease Father        Vitals:  BP (!) 147/82   Pulse 79   Temp 97.7 °F (36.5 °C) (Oral)   Resp 22   Ht 5' 3\" (1.6 m)   Wt 221 lb 9 oz (100.5 kg)   SpO2 91%   BMI 39.25 kg/m²   Temp (24hrs), Av °F (36.7 °C), Min:97.7 °F (36.5 °C), Max:98.4 °F (36.9 °C)    No results for input(s): POCGLU in the last 72 hours. I/O (24Hr):     Intake/Output Summary (Last 24 hours) at 2021 1055  Last data filed at 2021 0648  Gross per 24 hour   Intake 10 ml   Output 100 ml   Net -90 ml       Labs:  Hematology:  Recent Labs     21  0340   WBC 10.9* 10.0   RBC 3.82* 3.54*   HGB 12.4 11.3*   HCT 40.5 35.4*   .0* 100.0*   MCH 32.5 31.9   MCHC 30.6* 31.9*    173   MPV 9.8 10.4     Chemistry:  Recent Labs     21  0340    146*   K 4.7 4.9    108   CO2 28 25   GLUCOSE 101 169*   BUN 28* 32*   CREATININE 1.0 1.1   ANIONGAP 11.0 13.0   LABGLOM 53* 47*   CALCIUM 9.5 9.6   PROBNP 4260.0*  --      Recent Labs     21  1829   PROT 6.8   LABALBU 4.4   AST 15   ALT 13   ALKPHOS 95   BILITOT 0.6     ABG:  Lab Results   Component Value Date    PH 7.38 12/03/2021    PCO2 52 12/03/2021    PO2 74 12/03/2021    HCO3 30 12/03/2021    O2SAT 94 12/03/2021     No results found for: SPECIAL  No results found for: CULTURE    Radiology:  XR CHEST PORTABLE    Result Date: 12/3/2021  Cardiomegaly. No other acute cardiopulmonary disease retrocardiac infiltrates cannot be excluded. **This report has been created using voice recognition software. It may contain minor errors which are inherent in voice recognition technology. ** Final report electronically signed by Dr. Sulaiman Bennett on 12/3/2021 6:10 PM      Physical Examination:        General appearance:  alert, cooperative and no distress  Mental Status:  oriented to person, place and time and normal affect  Lungs: Crackles appreciated bilaterally  Heart:  regular rate and rhythm, no murmur  Abdomen:  soft, nontender, nondistended, normal bowel sounds, no masses, hepatomegaly, splenomegaly  Extremities:  no edema, redness, tenderness in the calves  Skin:  no gross lesions, rashes, induration    Assessment:        Hospital Problems           Last Modified POA    COPD exacerbation ([address] 12/3/2021 Yes          Plan:        #Acute on Chronic Hypoxic respiratory Failure, multifactorial etiology, likely 2/2 CHFe/COPDe: Pt presented w/worsening of her chronic hypoxic RF, 2L-4LNC requirement normally. Currently on 6LNC w/sats>90%, pre-test probability for PE unremarkable. Known Diastolic HF, Moderate PHT, and undiagnosed COPD. Component of JOHNIE as well given her PHT, HTN, bodily habitus, difficulty sleeping at night?? No PSG on file either, can be completed as an OP.   - Continue to wean as tolerated w/goal O2sats >90%  - DuoNebs and IS at bedside when needed  -We will start patient on Lasix 20 mg IV daily    #? COPDe likely 2/2 CHFe, less likely infection; Component of ILD?: No official PFTs on file at this time w/multiple attempts to evaluate. Previous CT chests demonstrate cardiomegaly and fibro-emphysematous changes.  Previously noted to have mild PHT (group III?) likely contributing to presentation. Received Decadron 6mg in ED along with DuoNebs. Pt admits to copious mucopurulent sputum production but states it's not a/w cough, more so when she eats. - C/w Prednisone 40mg Daily x5days for suspected COPDe  - C/w treatments as outlined in the above AoCHRF  - Will need PFTs/PSG as an OP on d/c  - No Abx started in ED as it appears unlikely pt has an infection - can consider if pt does not improve and continually requires      #? Acute on Chronic HFpEF, EF 55% 2018, G2DD: No current Echo, b/l LE edema, obese and not definitive evaluation of edema in hips, etc.. No apparent JVD. Pro-BNP 4260, 2x the baseline values previously. Taking Lopressor for A-fib, Lisinopril, and Zaroxolyn at home. Unsure of current LV function, after Echo can reevaluate the need for adjusted GDMT. - Ordered 2D Echo will f/u when complete     #Elevated troponins likely 2/2 to demand ischemia; Hx of CAD: Elevated on admission 0.018, acute CHF? Pt does not appear volume down at this time and BP was elevated on arrival. HTN, A-fib, and CHF/COPD contributing to myocardial injury?   - Will order another troponin to trend direction  - EKG demonstrates A-fib, LAD, q-waves in Inferior and anteroseptal leads, no signs of Ischemia   - Has a Hx of CAD, stable on ASA, could theoretically stop ASA and continue w/Eliquis given her A-fib (AFIRE trial)     UTI   -Started patient on Rocephin     #Chronic Atrial Fib not on [facility] 2/2 IVH in 2020: Follows with Dr. Charo Gabriel showing in atrial fibrillation with a controlled ventricular rate; Pt is on Lopressor 100mg BID. Eliquis was stopped after IVH due to mechanical fall in 2020. Was supposed to discuss w/PCP and  on whether to restart or not, given the risk for strokes. [id number] score 7 (Stroke/TIA/Systemic embolism risk of 11.2-15.7%/year) and HAS-BLED score at least 4, High risk of Bleeding.  Benefits-to-risks discussion can be had w/pt again. Hx of SVT s/p ablation.   - Candidate for Ablation or Watchman?   - EKG continues to show A-fib w/adequate ventricular response  - C/w Lopressor 100mg BID      #CKD Stage IIIa w/baseline Cr: Hx of CKD and episodes of TYRON/UTIs in the past. Has been seen by Nephrology in clinic. Seems at Baseline, continue to monitor fluid status for need of diuretics vs. Fluids.     #Essential hypertension, uncontrolled: Pt is on Lopressor, Zaroxolyn, lisinopril, Norvasc; monitor for lability. Can adjust as needed based on current BP. Hold parameters on medications.      #Hx of CVA at 20yo (Subjective) and IVH 2020: Residual chronic right-sided weakness from a previous of stroke that occurred more than 50 years ago. She does use a walker at home to ambulate and wears a leg brace on her right leg.  Mechanical fall in 2020 -> IVH, seen by Neuro     #Hx of Hypothyroidism: C/w home Synthroid     #Hx of GERD: C/w Home Pepcid, no PPI given her hx of CKD and recurrent TYRON     #Hx of Anxiety: C/w home Jeremías Zapien MD  12/4/2021  10:55 AM

## 2021-12-05 LAB
ERYTHROCYTE [DISTWIDTH] IN BLOOD BY AUTOMATED COUNT: 14.8 % (ref 11.5–14.5)
ERYTHROCYTE [DISTWIDTH] IN BLOOD BY AUTOMATED COUNT: 54.2 FL (ref 35–45)
HCT VFR BLD CALC: 35.1 % (ref 37–47)
HEMOGLOBIN: 11.1 GM/DL (ref 12–16)
MCH RBC QN AUTO: 32 PG (ref 26–33)
MCHC RBC AUTO-ENTMCNC: 31.6 GM/DL (ref 32.2–35.5)
MCV RBC AUTO: 101.2 FL (ref 81–99)
PLATELET # BLD: 163 THOU/MM3 (ref 130–400)
PMV BLD AUTO: 10.6 FL (ref 9.4–12.4)
RBC # BLD: 3.47 MILL/MM3 (ref 4.2–5.4)
WBC # BLD: 13.2 THOU/MM3 (ref 4.8–10.8)

## 2021-12-05 PROCEDURE — 94640 AIRWAY INHALATION TREATMENT: CPT

## 2021-12-05 PROCEDURE — 6360000002 HC RX W HCPCS: Performed by: STUDENT IN AN ORGANIZED HEALTH CARE EDUCATION/TRAINING PROGRAM

## 2021-12-05 PROCEDURE — 2580000003 HC RX 258: Performed by: INTERNAL MEDICINE

## 2021-12-05 PROCEDURE — 94761 N-INVAS EAR/PLS OXIMETRY MLT: CPT

## 2021-12-05 PROCEDURE — 2580000003 HC RX 258: Performed by: STUDENT IN AN ORGANIZED HEALTH CARE EDUCATION/TRAINING PROGRAM

## 2021-12-05 PROCEDURE — 36415 COLL VENOUS BLD VENIPUNCTURE: CPT

## 2021-12-05 PROCEDURE — 6370000000 HC RX 637 (ALT 250 FOR IP): Performed by: HOSPITALIST

## 2021-12-05 PROCEDURE — 2140000000 HC CCU INTERMEDIATE R&B

## 2021-12-05 PROCEDURE — 6360000002 HC RX W HCPCS: Performed by: INTERNAL MEDICINE

## 2021-12-05 PROCEDURE — 6370000000 HC RX 637 (ALT 250 FOR IP): Performed by: STUDENT IN AN ORGANIZED HEALTH CARE EDUCATION/TRAINING PROGRAM

## 2021-12-05 PROCEDURE — 85027 COMPLETE CBC AUTOMATED: CPT

## 2021-12-05 PROCEDURE — 99232 SBSQ HOSP IP/OBS MODERATE 35: CPT | Performed by: INTERNAL MEDICINE

## 2021-12-05 RX ADMIN — METOLAZONE 5 MG: 5 TABLET ORAL at 09:17

## 2021-12-05 RX ADMIN — Medication 1.5 MG: at 22:36

## 2021-12-05 RX ADMIN — PREDNISONE 40 MG: 20 TABLET ORAL at 09:18

## 2021-12-05 RX ADMIN — LISINOPRIL 10 MG: 10 TABLET ORAL at 09:17

## 2021-12-05 RX ADMIN — FUROSEMIDE 20 MG: 40 INJECTION, SOLUTION INTRAMUSCULAR; INTRAVENOUS at 09:28

## 2021-12-05 RX ADMIN — IPRATROPIUM BROMIDE AND ALBUTEROL SULFATE 1 AMPULE: .5; 3 SOLUTION RESPIRATORY (INHALATION) at 20:16

## 2021-12-05 RX ADMIN — ENOXAPARIN SODIUM 40 MG: 100 INJECTION SUBCUTANEOUS at 09:25

## 2021-12-05 RX ADMIN — ACETAMINOPHEN 650 MG: 325 TABLET ORAL at 22:36

## 2021-12-05 RX ADMIN — IPRATROPIUM BROMIDE AND ALBUTEROL SULFATE 1 AMPULE: .5; 3 SOLUTION RESPIRATORY (INHALATION) at 12:32

## 2021-12-05 RX ADMIN — ASPIRIN 81 MG: 81 TABLET, COATED ORAL at 09:17

## 2021-12-05 RX ADMIN — AMLODIPINE BESYLATE 5 MG: 5 TABLET ORAL at 09:17

## 2021-12-05 RX ADMIN — IPRATROPIUM BROMIDE AND ALBUTEROL SULFATE 1 AMPULE: .5; 3 SOLUTION RESPIRATORY (INHALATION) at 15:46

## 2021-12-05 RX ADMIN — POTASSIUM CHLORIDE 20 MEQ: 1500 TABLET, EXTENDED RELEASE ORAL at 09:52

## 2021-12-05 RX ADMIN — METOPROLOL TARTRATE 100 MG: 100 TABLET, FILM COATED ORAL at 09:17

## 2021-12-05 RX ADMIN — LEVOTHYROXINE SODIUM 175 MCG: 0.15 TABLET ORAL at 05:54

## 2021-12-05 RX ADMIN — CEFTRIAXONE SODIUM 1000 MG: 1 INJECTION, POWDER, FOR SOLUTION INTRAMUSCULAR; INTRAVENOUS at 13:42

## 2021-12-05 RX ADMIN — FAMOTIDINE 20 MG: 20 TABLET ORAL at 19:53

## 2021-12-05 RX ADMIN — FOLIC ACID 1 MG: 1 TABLET ORAL at 09:17

## 2021-12-05 RX ADMIN — SODIUM CHLORIDE, PRESERVATIVE FREE 10 ML: 5 INJECTION INTRAVENOUS at 09:30

## 2021-12-05 RX ADMIN — ALLOPURINOL 100 MG: 100 TABLET ORAL at 09:17

## 2021-12-05 RX ADMIN — SODIUM CHLORIDE, PRESERVATIVE FREE 10 ML: 5 INJECTION INTRAVENOUS at 19:53

## 2021-12-05 RX ADMIN — IPRATROPIUM BROMIDE AND ALBUTEROL SULFATE 1 AMPULE: .5; 3 SOLUTION RESPIRATORY (INHALATION) at 08:33

## 2021-12-05 ASSESSMENT — PAIN SCALES - GENERAL
PAINLEVEL_OUTOF10: 0
PAINLEVEL_OUTOF10: 0
PAINLEVEL_OUTOF10: 3

## 2021-12-05 NOTE — PROGRESS NOTES
A home oxygen evaluation has been completed. [x]Patient is an inpatient. It is expected that the patient will be discharged within the next 48 hours. Qualified provider to write order for home prescription if patient qualifies. Social service/care managers will arrange for home oxygen. If patient is active, arrange for Home Medical supplier to assess for Oxygen Conserving Device per pulse oximetry. []Patient is an outpatient. Results will be faxed to the ordering provider. Qualified provider to write order for home prescription if patient qualifies and arranges for home oxygen. Patient was placed on room air for 15 minutes. SpO2 was 86 % on room air at rest. Patients SpO2 was below 89% and qualified for home oxygen. Oxygen was applied at 1 lpm via nasal cannula to maintain a SpO2 between 90-92% while at rest. Actual SpO2 was 91 %. Patient able to ambulate for exercise flow rate. Patient was ambulated, SpO2 was 91% on 4 lpm to maintain SpO2 between 90-92% while exercising. Note: For any SpO2 at 91% see policy and procedure for possible qualifications.

## 2021-12-05 NOTE — PROGRESS NOTES
[address]    Progress Note    12/5/2021    1:15 PM    Name:   Veena Trinidad  MRN:     [id number]     [place]:      [de-identified]   Room:   [address] [place], [place] [address]A   Day:  2  Admit Date:  12/3/2021  5:04 PM    PCP:   Anson Holguin DO  Code Status:  Limited    Subjective:     C/C:   Chief Complaint   Patient presents with    Shortness of Breath     Interval History Status: not changed. Patient was seen and evaluated at bedside this morning. Patient reports that her breathing is about the same as yesterday. Patient denies any new complaint at this time. Brief History:     43-year-old female who was admitted with worsening shortness of breath. Patient was noted to be in CHF and COPD exacerbation. Review of Systems:     Constitutional:  negative for chills, fevers, sweats  Respiratory:  negative for cough, dyspnea on exertion, shortness of breath, wheezing  Cardiovascular:  negative for chest pain, chest pressure/discomfort, lower extremity edema, palpitations  Gastrointestinal:  negative for abdominal pain, constipation, diarrhea, nausea, vomiting  Neurological:  negative for dizziness, headache    Medications:      Allergies:  No Known Allergies    Current Meds:   Scheduled Meds:    levothyroxine  175 mcg Oral Daily    sodium chloride flush  5-40 mL IntraVENous 2 times per day    predniSONE  40 mg Oral Daily    furosemide  20 mg IntraVENous Daily    cefTRIAXone (ROCEPHIN) IV  1,000 mg IntraVENous Q24H    allopurinol  100 mg Oral Daily    amLODIPine  5 mg Oral Daily    aspirin  81 mg Oral Daily    famotidine  20 mg Oral Nightly    folic acid  1 mg Oral Daily    lisinopril  10 mg Oral Daily    metOLazone  5 mg Oral Daily    metoprolol  100 mg Oral BID    potassium chloride  20 mEq Oral Daily    enoxaparin  40 mg SubCUTAneous Daily    ipratropium-albuterol  1 ampule Inhalation Q4H WA     Continuous Infusions:    sodium chloride       PRN Meds: sodium chloride flush, melatonin, sodium chloride, ondansetron **OR** ondansetron, polyethylene glycol, acetaminophen **OR** acetaminophen    Data:     Past Medical History:   has a past medical history of Arthritis, CAD (coronary artery disease), Cerebral artery occlusion with cerebral infarction ([facility] [address] Chronic kidney disease, COPD exacerbation [facility] GERD (gastroesophageal reflux disease), History of blood transfusion, Hyperlipidemia, Hypertension, Movement disorder, Pneumonia, and Thyroid disease. Social History:   reports that she has never smoked. She has never used smokeless tobacco. She reports that she does not drink alcohol and does not use drugs. Family History:   Family History   Problem Relation Age of Onset    Other Mother     Heart Disease Father        Vitals:  /74   Pulse 63   Temp 98.9 °F (37.2 °C) (Oral)   Resp 20   Ht 5' 3\" (1.6 m)   Wt 224 lb 3.3 oz (101.7 kg)   SpO2 91%   BMI 39.72 kg/m²   Temp (24hrs), Av.7 °F (36.5 °C), Min:96.6 °F (35.9 °C), Max:98.9 °F (37.2 °C)    No results for input(s): POCGLU in the last 72 hours. I/O (24Hr):     Intake/Output Summary (Last 24 hours) at 2021 1315  Last data filed at 2021 0930  Gross per 24 hour   Intake 1089.45 ml   Output 800 ml   Net 289.45 ml       Labs:  Hematology:  Recent Labs     21  0340 21  0337   WBC 10.9* 10.0 13.2*   RBC 3.82* 3.54* 3.47*   HGB 12.4 11.3* 11.1*   HCT 40.5 35.4* 35.1*   .0* 100.0* 101.2*   MCH 32.5 31.9 32.0   MCHC 30.6* 31.9* 31.6*    173 163   MPV 9.8 10.4 10.6     Chemistry:  Recent Labs     21  0340    146*   K 4.7 4.9    108   CO2 28 25   GLUCOSE 101 169*   BUN 28* 32*   CREATININE 1.0 1.1   ANIONGAP 11.0 13.0   LABGLOM 53* 47*   CALCIUM 9.5 9.6   PROBNP 4260.0*  --      Recent Labs     21  1829   PROT 6.8   LABALBU 4.4   AST 15   ALT 13   ALKPHOS 95   BILITOT 0.6     ABG:  Lab Results   Component Value Date    PH 7.38 12/03/2021    PCO2 52 12/03/2021    PO2 74 12/03/2021    HCO3 30 12/03/2021    O2SAT 94 12/03/2021     No results found for: SPECIAL  No results found for: CULTURE    Radiology:  XR CHEST PORTABLE    Result Date: 12/3/2021  Cardiomegaly. No other acute cardiopulmonary disease retrocardiac infiltrates cannot be excluded. **This report has been created using voice recognition software. It may contain minor errors which are inherent in voice recognition technology. ** Final report electronically signed by Dr. Pipe Peraza on 12/3/2021 6:10 PM      Physical Examination:        General appearance:  alert, cooperative and no distress  Mental Status:  oriented to person, place and time and normal affect  Lungs: Crackles appreciated bilaterally  Heart:  regular rate and rhythm, no murmur  Abdomen:  soft, nontender, nondistended, normal bowel sounds, no masses, hepatomegaly, splenomegaly  Extremities:  no edema, redness, tenderness in the calves  Skin:  no gross lesions, rashes, induration    Assessment:        Hospital Problems           Last Modified POA    COPD exacerbation ([address] 12/3/2021 Yes          Plan:        #Acute on Chronic Hypoxic respiratory Failure, multifactorial etiology, likely 2/2 CHFe/COPDe: Pt presented w/worsening of her chronic hypoxic RF, 2L-4LNC requirement normally. Patient is on 4 L of oxygen via nasal cannula this morning- Continue to wean as tolerated w/goal O2sats >90%  - DuoNebs and IS at bedside when needed  -Continue Lasix 20 mg IV daily    #? COPDe likely 2/2 CHFe, less likely infection; Component of ILD?: No official PFTs on file at this time w/multiple attempts to evaluate. Previous CT chests demonstrate cardiomegaly and fibro-emphysematous changes. Previously noted to have mild PHT (group III?) likely contributing to presentation. Received Decadron 6mg in ED along with DuoNebs.  Pt admits to copious mucopurulent sputum production but states it's not a/w cough, more so when she eats. - C/w Prednisone 40mg Daily x5days for suspected COPDe  - C/w treatments as outlined in the above AoCHRF  - Will need PFTs/PSG as an OP on d/c  - No Abx started in ED as it appears unlikely pt has an infection - can consider if pt does not improve and continually requires      #? Acute on Chronic HFpEF, EF 55% 2018, G2DD: No current Echo, b/l LE edema, obese and not definitive evaluation of edema in hips, etc.. No apparent JVD. Pro-BNP 4260, 2x the baseline values previously. Taking Lopressor for A-fib, Lisinopril, and Zaroxolyn at home. Unsure of current LV function, after Echo can reevaluate the need for adjusted GDMT. - Ordered 2D Echo will f/u when complete     #Elevated troponins likely 2/2 to demand ischemia; Hx of CAD: Elevated on admission 0.018, acute CHF? Pt does not appear volume down at this time and BP was elevated on arrival. HTN, A-fib, and CHF/COPD contributing to myocardial injury?   - Will order another troponin to trend direction  - EKG demonstrates A-fib, LAD, q-waves in Inferior and anteroseptal leads, no signs of Ischemia   - Has a Hx of CAD, stable on ASA, could theoretically stop ASA and continue w/Eliquis given her A-fib (AFIRE trial)     UTI   -Started patient on Rocephin     #Chronic Atrial Fib not on [address] 2/2 IVH in 2020: Follows with Dr. Julianna Smith showing in atrial fibrillation with a controlled ventricular rate; Pt is on Lopressor 100mg BID. Eliquis was stopped after IVH due to mechanical fall in 2020. Was supposed to discuss w/PCP and  on whether to restart or not, given the risk for strokes. [id number] score 7 (Stroke/TIA/Systemic embolism risk of 11.2-15.7%/year) and HAS-BLED score at least 4, High risk of Bleeding. Benefits-to-risks discussion can be had w/pt again.  Hx of SVT s/p ablation.   - Candidate for Ablation or Watchman?   - EKG continues to show A-fib w/adequate ventricular response  - C/w Lopressor 100mg BID      #CKD Stage IIIa w/baseline Cr: Hx of CKD and episodes of TYRON/UTIs in the past. Has been seen by Nephrology in clinic. Seems at Baseline, continue to monitor fluid status for need of diuretics vs. Fluids.     #Essential hypertension, uncontrolled: Pt is on Lopressor, Zaroxolyn, lisinopril, Norvasc; monitor for lability. Can adjust as needed based on current BP. Hold parameters on medications.      #Hx of CVA at 20yo (Subjective) and IVH 2020: Residual chronic right-sided weakness from a previous of stroke that occurred more than 50 years ago. She does use a walker at home to ambulate and wears a leg brace on her right leg.  Mechanical fall in 2020 -> IVH, seen by Neuro     #Hx of Hypothyroidism: C/w home Synthroid     #Hx of GERD: C/w Home Pepcid, no PPI given her hx of CKD and recurrent TYRON     #Hx of Anxiety: C/w home Yaminiexanand Loaiza MD  12/5/2021  1:15 PM

## 2021-12-06 ENCOUNTER — APPOINTMENT (OUTPATIENT)
Dept: GENERAL RADIOLOGY | Age: 81
DRG: 291 | End: 2021-12-06
Payer: MEDICARE

## 2021-12-06 LAB
ERYTHROCYTE [DISTWIDTH] IN BLOOD BY AUTOMATED COUNT: 14.7 % (ref 11.5–14.5)
ERYTHROCYTE [DISTWIDTH] IN BLOOD BY AUTOMATED COUNT: 54.6 FL (ref 35–45)
HCT VFR BLD CALC: 36.2 % (ref 37–47)
HEMOGLOBIN: 11.1 GM/DL (ref 12–16)
MCH RBC QN AUTO: 31.7 PG (ref 26–33)
MCHC RBC AUTO-ENTMCNC: 30.7 GM/DL (ref 32.2–35.5)
MCV RBC AUTO: 103.4 FL (ref 81–99)
PLATELET # BLD: 156 THOU/MM3 (ref 130–400)
PMV BLD AUTO: 10.7 FL (ref 9.4–12.4)
RBC # BLD: 3.5 MILL/MM3 (ref 4.2–5.4)
WBC # BLD: 10.6 THOU/MM3 (ref 4.8–10.8)

## 2021-12-06 PROCEDURE — 6360000002 HC RX W HCPCS: Performed by: INTERNAL MEDICINE

## 2021-12-06 PROCEDURE — 2580000003 HC RX 258: Performed by: INTERNAL MEDICINE

## 2021-12-06 PROCEDURE — 92611 MOTION FLUOROSCOPY/SWALLOW: CPT

## 2021-12-06 PROCEDURE — 6370000000 HC RX 637 (ALT 250 FOR IP): Performed by: STUDENT IN AN ORGANIZED HEALTH CARE EDUCATION/TRAINING PROGRAM

## 2021-12-06 PROCEDURE — 2500000003 HC RX 250 WO HCPCS: Performed by: INTERNAL MEDICINE

## 2021-12-06 PROCEDURE — 94640 AIRWAY INHALATION TREATMENT: CPT

## 2021-12-06 PROCEDURE — 2140000000 HC CCU INTERMEDIATE R&B

## 2021-12-06 PROCEDURE — 2580000003 HC RX 258: Performed by: STUDENT IN AN ORGANIZED HEALTH CARE EDUCATION/TRAINING PROGRAM

## 2021-12-06 PROCEDURE — 85027 COMPLETE CBC AUTOMATED: CPT

## 2021-12-06 PROCEDURE — 94660 CPAP INITIATION&MGMT: CPT

## 2021-12-06 PROCEDURE — 6360000002 HC RX W HCPCS: Performed by: STUDENT IN AN ORGANIZED HEALTH CARE EDUCATION/TRAINING PROGRAM

## 2021-12-06 PROCEDURE — 74230 X-RAY XM SWLNG FUNCJ C+: CPT

## 2021-12-06 PROCEDURE — 6370000000 HC RX 637 (ALT 250 FOR IP): Performed by: HOSPITALIST

## 2021-12-06 PROCEDURE — 36415 COLL VENOUS BLD VENIPUNCTURE: CPT

## 2021-12-06 PROCEDURE — 99232 SBSQ HOSP IP/OBS MODERATE 35: CPT | Performed by: INTERNAL MEDICINE

## 2021-12-06 RX ADMIN — IPRATROPIUM BROMIDE AND ALBUTEROL SULFATE 1 AMPULE: .5; 3 SOLUTION RESPIRATORY (INHALATION) at 16:24

## 2021-12-06 RX ADMIN — SODIUM CHLORIDE, PRESERVATIVE FREE 10 ML: 5 INJECTION INTRAVENOUS at 20:59

## 2021-12-06 RX ADMIN — ALLOPURINOL 100 MG: 100 TABLET ORAL at 10:40

## 2021-12-06 RX ADMIN — BARIUM SULFATE 20 ML: 400 PASTE ORAL at 09:48

## 2021-12-06 RX ADMIN — BARIUM SULFATE 140 ML: 980 POWDER, FOR SUSPENSION ORAL at 09:50

## 2021-12-06 RX ADMIN — LISINOPRIL 10 MG: 10 TABLET ORAL at 10:40

## 2021-12-06 RX ADMIN — METOLAZONE 5 MG: 5 TABLET ORAL at 10:40

## 2021-12-06 RX ADMIN — SODIUM CHLORIDE, PRESERVATIVE FREE 10 ML: 5 INJECTION INTRAVENOUS at 10:42

## 2021-12-06 RX ADMIN — BARIUM SULFATE 100 ML: 0.81 POWDER, FOR SUSPENSION ORAL at 09:49

## 2021-12-06 RX ADMIN — PREDNISONE 40 MG: 20 TABLET ORAL at 10:40

## 2021-12-06 RX ADMIN — POTASSIUM CHLORIDE 20 MEQ: 1500 TABLET, EXTENDED RELEASE ORAL at 10:40

## 2021-12-06 RX ADMIN — AMLODIPINE BESYLATE 5 MG: 5 TABLET ORAL at 10:40

## 2021-12-06 RX ADMIN — FUROSEMIDE 20 MG: 40 INJECTION, SOLUTION INTRAMUSCULAR; INTRAVENOUS at 10:41

## 2021-12-06 RX ADMIN — ACETAMINOPHEN 650 MG: 325 TABLET ORAL at 20:58

## 2021-12-06 RX ADMIN — METOPROLOL TARTRATE 100 MG: 100 TABLET, FILM COATED ORAL at 10:40

## 2021-12-06 RX ADMIN — ENOXAPARIN SODIUM 40 MG: 100 INJECTION SUBCUTANEOUS at 10:41

## 2021-12-06 RX ADMIN — Medication 1.5 MG: at 20:58

## 2021-12-06 RX ADMIN — ASPIRIN 81 MG: 81 TABLET, COATED ORAL at 10:40

## 2021-12-06 RX ADMIN — METOPROLOL TARTRATE 100 MG: 100 TABLET, FILM COATED ORAL at 20:59

## 2021-12-06 RX ADMIN — CEFTRIAXONE SODIUM 1000 MG: 1 INJECTION, POWDER, FOR SOLUTION INTRAMUSCULAR; INTRAVENOUS at 15:30

## 2021-12-06 RX ADMIN — BARIUM SULFATE 20 ML: 400 SUSPENSION ORAL at 09:51

## 2021-12-06 RX ADMIN — LEVOTHYROXINE SODIUM 175 MCG: 0.15 TABLET ORAL at 10:40

## 2021-12-06 RX ADMIN — BARIUM SULFATE 20 ML: 400 SUSPENSION ORAL at 09:49

## 2021-12-06 RX ADMIN — FOLIC ACID 1 MG: 1 TABLET ORAL at 10:40

## 2021-12-06 RX ADMIN — FAMOTIDINE 20 MG: 20 TABLET ORAL at 20:59

## 2021-12-06 ASSESSMENT — PAIN DESCRIPTION - PAIN TYPE: TYPE: CHRONIC PAIN

## 2021-12-06 ASSESSMENT — PAIN DESCRIPTION - LOCATION: LOCATION: GENERALIZED

## 2021-12-06 ASSESSMENT — PAIN SCALES - GENERAL
PAINLEVEL_OUTOF10: 5
PAINLEVEL_OUTOF10: 0
PAINLEVEL_OUTOF10: 0
PAINLEVEL_OUTOF10: 5
PAINLEVEL_OUTOF10: 0

## 2021-12-06 ASSESSMENT — PAIN DESCRIPTION - FREQUENCY: FREQUENCY: CONTINUOUS

## 2021-12-06 ASSESSMENT — PAIN DESCRIPTION - DESCRIPTORS: DESCRIPTORS: ACHING

## 2021-12-06 ASSESSMENT — PAIN DESCRIPTION - PROGRESSION: CLINICAL_PROGRESSION: GRADUALLY WORSENING

## 2021-12-06 ASSESSMENT — PAIN - FUNCTIONAL ASSESSMENT: PAIN_FUNCTIONAL_ASSESSMENT: ACTIVITIES ARE NOT PREVENTED

## 2021-12-06 NOTE — PROGRESS NOTES
[facility]  SPEECH THERAPY  STRZ CCU-STEPDOWN 3B  Modified Barium Swallow    SLP Individual Minutes  Time In: 8662  Time Out: 4757  Minutes: 18  Timed Code Treatment Minutes: 0 Minutes     Date: 2021  Patient Name: Agustín Li      CSN: [id number]   : 1940  (78 y.o.)  Gender: female   Referring Physician:  Suzette Kilpatrick MD  Diagnosis: COPD Exacerbation  Secondary Diagnosis: Dysphagia  Precautions: fall risk aspiration precautions  History of Present Illness/Injury: Patient admitted to [facility] with above medical dx. Per chart review, Agustín Li is a 78 y.o. female w/siginificant PMHx of HTN, CAD, A fib not on [address], HLD, CKD, hx of CVA w/residual b/l LE weakness, Intracranial hemorrhage 2/2 to mechanical fall in , Hypothyroidism, COPD, and chronic hypoxic respiratory failure who presented to the [facility] ED via EMS from the [facility] for progressively worsening SOB, nasal congestion, non-productive cough, and mucopurulent sputum over the course of approximately 1 week. Pt states that she began having a greater difficulty breathing last night that acutely worsened her SOB to where she felt air hungry and anxious. Pt normally on [facility] and was increased to [facility] for the SOB and was noted to have an O2 sat of 84% at the NH while this incident was occurring. Pt denies CP, abdominal pain, fever/chills/rigors, N/V.\" ST consulted to complete MBS to r/o pharyngeal dysfunction and determine safe dietary level d/t overt s/s of aspiration observed within clinical swallow evaluation. has a past medical history of Arthritis, CAD (coronary artery disease), Cerebral artery occlusion with cerebral infarction ([address] Chronic kidney disease, COPD exacerbation ([facility] [address] GERD (gastroesophageal reflux disease), History of blood transfusion, Hyperlipidemia, Hypertension, Movement disorder, Pneumonia, and Thyroid disease.        Current Diet: Soft and bite sized+thin liquids    Pain: No pain reported. SUBJECTIVE:  Patient with arrival to fluoro suite via bed. Patient reporting confusion -- she thought she was having surgery upon arrival to fluoro suite. ST explained MBS to be completed, patient with verbal receptiveness noted. Patient with positive participation throughout assessment, however patient with continued confusion. OBJECTIVE:    Respiratory Status:  Nasal Canula (6L)    Behavioral Observation:  Alert and Confused    PATIENT WAS EVALUATED USING: BARIUM: Thin liquid via spoon/cup/straw, mildly thick liquids via cup/straw, moderately thick liquids via cup, puree, mixed consistencies, hard/coarse solid    ORAL PREPARATION PHASE:  Impaired:  Slow Mastication and Uncoordinated Mastication    ORAL PHASE: Slow AP Movement and Uncontrolled Bolus/Diffuse Fall Over Tongue Base     ORAL PHASE RENEE SCORE: (Dysphagia outcome and severity scale)  4 = Mild-Moderate Dysphagia - May have one or two diet consistencies restricted - Oral residue clears with cue - Intermittent supervision or cueing    PHARYNGEAL PHASE:  Impaired: Decreased Airway Protection, Decreased Tongue Based Retraction, Residue in the Valleculae and Decreased Thyrohyoid Approximation     PHARYNGEAL PHASE RENEE SCORE: (Dysphagia outcome and severity scale)  3 = Moderate Dysphagia - Two or more diet consistencies restricted - May exhibit one or more of the following:  Moderate residue clears with cue, Airway penetration to the level of the vocal folds wihtout cough with tow or more consistencies, Aspiration with two consistencies with weak or no reflexive cough, Aspiration of one consistency, no cough and airway penetration with one consistency, no cough    EVIDENCE FOR LARYNGEAL PENETRATION AND/OR ASPIRATION:  Laryngeal penetration evident with thin liquid, mixed consistency liquids, mildly thick liquid, moderately thick liquid  Audible aspiration evident with thin liquid, mildly thick liquids (delayed spontaneous cough reflex)    PENETRATION-ASPIRATION SCALE (PAS): Thin Liquids: 6 = Material enters the airway, passes below the vocal folds, and is ejected into the larynx or out of the airway  Mildly Thick Liquids:  6 = Material enters the airway, passes below the vocal folds, and is ejected into the larynx or out of the airway  Moderately Thick Liquids: 2 = Material enters the airway, remains above vocal folds, and is ejected from the airway  Puree:  1 = Material does not enter the airway  Soft Solid:  4 = Material enters the airway, contacts the vocal folds, and is ejected from the airway  Hard Solid: 1 = Material does not enter the airway    ESOPHAGEAL PHASE:   No significant findings    ATTEMPTED TECHNIQUES:  Small Bolus Size Effective    Straw Ineffective    Cup Effective and Ineffective Inconsistently effective/ineffective across all liquid trials   Chin Tuck Ineffective Patient unable to carryover d/t mentation    Head Turn Not Attempted    Spoon Presentations Effective and Ineffective    Volitional Cough Not Attempted    Spontaneous Cough Effective and Ineffective Delayed spontaneous cough that was effective at clearing tracheal aspiration. DIAGNOSTIC IMPRESSIONS:  Patient presents with mild-moderate oral dysphagia and moderate pharyngeal dysphagia evidenced by clinical findings from instrumental assessment outlined above. During the oral phase patient demonstrated slightly prolonged and uncoordinated mastication (munching pattern), lingual rocking, slow AP movement, and decreased bolus control resulting in premature spillage into the pharynx. Premature spillage was to the level of the valleculae. Patient with relatively timely swallow onset, with slightly decreased hyolaryngeal elevation and anterior excursion, however functional for patient's given age. Patient with decreased tongue base retraction resulting in trace residue on posterior tongue base.  Patient also noted with decreased thyrohyoid approximation leading to decreased airway protection and penetration/aspiration occurring DURING the swallow. Patient's mentation at time of evaluation may be impacting overall swallow function and airway protection. The following airway invasion events were documented during the instrumental study: Thin liquid via spoon and cup:  Patient demonstrated deep laryngeal penetration to the level of the vocal folds DURING the swallow and tracheal aspiration DURING the swallow s/t deep laryngeal penetration to the level of vocal folds resulting in delayed spontaneous cough that cleared tracheal aspiration. Mildly thick via cup:  Patient demonstrated deep laryngeal penetration to the level of the vocal folds DURING the swallow and tracheal aspiration DURING the swallow as a result of deep laryngeal penetration resulting in delayed spontaneous cough that was effective in clearing tracheal aspiration. Moderately thick via cup: Patient demonstrated shallow/transient laryngeal penetration BEFORE the swallow. Mixed consistencies (peach juice): Patient demonstrated deep laryngeal penetration to the level of the vocal folds DURING the swallow. Patient attempted to utilize CHIN TUCK with thin liquids; however, patient with poor carryover of chin tuck resulting in patient swallowing thin liquids prior to implementation of chin tuck with no penetration/aspiration observed with head in neutral position. Patient's overall swallow function across all trials highly inconsistent across all liquid trials this date. Patient at RISK for aspiration given decreased airway protection and mentation status. ST recommends patient consume a soft and bite sized diet+moderately thick liquids, NO STRAW, with direct 1:1 supervision, and avoids mixed consistencies. ST to f/u with skilled dysphagia management to return to baseline swallow function.     Following dysphagia study, ST with explanation of anatomy/physiology of swallow on YOLY unit, explaining patient at risk for aspiration with thin liquids and mildly thick liquids given weakened swallow structures. ST with instruction r/t risks of pneumonia following aspiration events and rationale r/t recommended diet level. Patient with verbal receptiveness noted however, question comprehension of education given current mentation. Patient would benefit from further education r/t swallow function given mentation status. *post evaluation, patient without respiratory distress upon leaving room; [facility] notified re: clinical findings and recommendations from the assessment; verbal receptiveness noted     Diet Recommendations:  Soft and bite sized diet+moderately thick liquids, NO STRAW, direct 1:1 supervision, avoid mixed consistencies  Strategies:  Full Upright Position, Small Bite/Sip, No Straw, Medications Crushed with Puree, Direct 1:1 Supervision and Alternate Solids and Liquids; avoid mixed consistencies   Rehabilitation Potential: Good    EDUCATION:  Learner: Patient  Education:  Reviewed results and recommendations of this evaluation, Reviewed ST goals and Plan of Care and Reviewed recommendations for follow-up  Evaluation of Education: Verbalizes understanding, Needs further instruction and Family not present    PLAN:  Skilled SLP intervention on acute care 3-5 x per week or until goals met and/or pt plateaus in function. Specific interventions for next session may include: dysphagia management. PATIENT GOAL:    Did not state. Will further assess during treatment. SHORT TERM GOALS:  Short-term Goals  Timeframe for Short-term Goals: 2 weeks  Goal 1: Patient will safely consume a soft and bite sized diet + moderately thick liquids (avoid mixed consistencies) with direct 1:1 supervision while implementing recommended swallow strategies without overt s/s of aspiration to maximize nutrition/hydration levels.   Goal 2: Patient will complete advanced PO trials to with chelsea textural breakdown, cohesive bolus formation, timely AP transit and no overt s/s of aspiration to determine readiness for dietary upgrade. Goal 3: Patient will complete pharyngeal strengthening exercises (effortful, beverly, CTAR) x10 with good success to improve strength of swallow function. LONG TERM GOALS:  No LTG established due to short ELOS.     BROOKLYN Crawford., Student Intern  [facility]) [address] Melodie Vargas M.A., [address]

## 2021-12-07 LAB
ANION GAP SERPL CALCULATED.3IONS-SCNC: 12 MEQ/L (ref 8–16)
BASOPHILS # BLD: 0.3 %
BASOPHILS ABSOLUTE: 0 THOU/MM3 (ref 0–0.1)
BUN BLDV-MCNC: 44 MG/DL (ref 7–22)
CALCIUM SERPL-MCNC: 9.3 MG/DL (ref 8.5–10.5)
CHLORIDE BLD-SCNC: 99 MEQ/L (ref 98–111)
CO2: 30 MEQ/L (ref 23–33)
CREAT SERPL-MCNC: 1.1 MG/DL (ref 0.4–1.2)
EOSINOPHIL # BLD: 0.3 %
EOSINOPHILS ABSOLUTE: 0 THOU/MM3 (ref 0–0.4)
ERYTHROCYTE [DISTWIDTH] IN BLOOD BY AUTOMATED COUNT: 14.4 % (ref 11.5–14.5)
ERYTHROCYTE [DISTWIDTH] IN BLOOD BY AUTOMATED COUNT: 51.5 FL (ref 35–45)
GFR SERPL CREATININE-BSD FRML MDRD: 47 ML/MIN/1.73M2
GLUCOSE BLD-MCNC: 91 MG/DL (ref 70–108)
HCT VFR BLD CALC: 40.6 % (ref 37–47)
HEMOGLOBIN: 13.1 GM/DL (ref 12–16)
IMMATURE GRANS (ABS): 0.15 THOU/MM3 (ref 0–0.07)
IMMATURE GRANULOCYTES: 1.3 %
LYMPHOCYTES # BLD: 18.1 %
LYMPHOCYTES ABSOLUTE: 2.1 THOU/MM3 (ref 1–4.8)
MAGNESIUM: 1.6 MG/DL (ref 1.6–2.4)
MCH RBC QN AUTO: 31.8 PG (ref 26–33)
MCHC RBC AUTO-ENTMCNC: 32.3 GM/DL (ref 32.2–35.5)
MCV RBC AUTO: 98.5 FL (ref 81–99)
MONOCYTES # BLD: 10.1 %
MONOCYTES ABSOLUTE: 1.2 THOU/MM3 (ref 0.4–1.3)
NUCLEATED RED BLOOD CELLS: 0 /100 WBC
PLATELET # BLD: 169 THOU/MM3 (ref 130–400)
PMV BLD AUTO: 10.2 FL (ref 9.4–12.4)
POTASSIUM SERPL-SCNC: 4.2 MEQ/L (ref 3.5–5.2)
RBC # BLD: 4.12 MILL/MM3 (ref 4.2–5.4)
SEG NEUTROPHILS: 69.9 %
SEGMENTED NEUTROPHILS ABSOLUTE COUNT: 8 THOU/MM3 (ref 1.8–7.7)
SODIUM BLD-SCNC: 141 MEQ/L (ref 135–145)
WBC # BLD: 11.5 THOU/MM3 (ref 4.8–10.8)

## 2021-12-07 PROCEDURE — 94660 CPAP INITIATION&MGMT: CPT

## 2021-12-07 PROCEDURE — 2140000000 HC CCU INTERMEDIATE R&B

## 2021-12-07 PROCEDURE — 94640 AIRWAY INHALATION TREATMENT: CPT

## 2021-12-07 PROCEDURE — 80048 BASIC METABOLIC PNL TOTAL CA: CPT

## 2021-12-07 PROCEDURE — 6370000000 HC RX 637 (ALT 250 FOR IP): Performed by: STUDENT IN AN ORGANIZED HEALTH CARE EDUCATION/TRAINING PROGRAM

## 2021-12-07 PROCEDURE — 2700000000 HC OXYGEN THERAPY PER DAY

## 2021-12-07 PROCEDURE — 6360000002 HC RX W HCPCS: Performed by: STUDENT IN AN ORGANIZED HEALTH CARE EDUCATION/TRAINING PROGRAM

## 2021-12-07 PROCEDURE — 83735 ASSAY OF MAGNESIUM: CPT

## 2021-12-07 PROCEDURE — 2580000003 HC RX 258: Performed by: STUDENT IN AN ORGANIZED HEALTH CARE EDUCATION/TRAINING PROGRAM

## 2021-12-07 PROCEDURE — 36415 COLL VENOUS BLD VENIPUNCTURE: CPT

## 2021-12-07 PROCEDURE — 85025 COMPLETE CBC W/AUTO DIFF WBC: CPT

## 2021-12-07 RX ORDER — MAGNESIUM SULFATE IN WATER 40 MG/ML
2000 INJECTION, SOLUTION INTRAVENOUS PRN
Status: DISCONTINUED | OUTPATIENT
Start: 2021-12-07 | End: 2021-12-11 | Stop reason: HOSPADM

## 2021-12-07 RX ORDER — FUROSEMIDE 10 MG/ML
40 INJECTION INTRAMUSCULAR; INTRAVENOUS ONCE
Status: COMPLETED | OUTPATIENT
Start: 2021-12-07 | End: 2021-12-07

## 2021-12-07 RX ORDER — DOXEPIN HYDROCHLORIDE 10 MG/1
10 CAPSULE ORAL NIGHTLY
Status: DISCONTINUED | OUTPATIENT
Start: 2021-12-07 | End: 2021-12-11 | Stop reason: HOSPADM

## 2021-12-07 RX ORDER — FUROSEMIDE 10 MG/ML
40 INJECTION INTRAMUSCULAR; INTRAVENOUS DAILY
Status: DISCONTINUED | OUTPATIENT
Start: 2021-12-08 | End: 2021-12-08

## 2021-12-07 RX ADMIN — FOLIC ACID 1 MG: 1 TABLET ORAL at 10:11

## 2021-12-07 RX ADMIN — METOPROLOL TARTRATE 100 MG: 100 TABLET, FILM COATED ORAL at 10:11

## 2021-12-07 RX ADMIN — MAGNESIUM SULFATE HEPTAHYDRATE 2000 MG: 2 INJECTION, SOLUTION INTRAVENOUS at 13:25

## 2021-12-07 RX ADMIN — LEVOTHYROXINE SODIUM 175 MCG: 0.15 TABLET ORAL at 05:52

## 2021-12-07 RX ADMIN — SODIUM CHLORIDE, PRESERVATIVE FREE 10 ML: 5 INJECTION INTRAVENOUS at 13:27

## 2021-12-07 RX ADMIN — METOPROLOL TARTRATE 100 MG: 100 TABLET, FILM COATED ORAL at 20:45

## 2021-12-07 RX ADMIN — IPRATROPIUM BROMIDE AND ALBUTEROL SULFATE 1 AMPULE: .5; 3 SOLUTION RESPIRATORY (INHALATION) at 12:40

## 2021-12-07 RX ADMIN — LISINOPRIL 10 MG: 10 TABLET ORAL at 10:11

## 2021-12-07 RX ADMIN — AMLODIPINE BESYLATE 5 MG: 5 TABLET ORAL at 10:11

## 2021-12-07 RX ADMIN — DOXEPIN HYDROCHLORIDE 10 MG: 10 CAPSULE ORAL at 20:48

## 2021-12-07 RX ADMIN — METOLAZONE 5 MG: 5 TABLET ORAL at 10:11

## 2021-12-07 RX ADMIN — FAMOTIDINE 20 MG: 20 TABLET ORAL at 20:49

## 2021-12-07 RX ADMIN — ASPIRIN 81 MG: 81 TABLET, COATED ORAL at 10:10

## 2021-12-07 RX ADMIN — IPRATROPIUM BROMIDE AND ALBUTEROL SULFATE 1 AMPULE: .5; 3 SOLUTION RESPIRATORY (INHALATION) at 08:58

## 2021-12-07 RX ADMIN — ALLOPURINOL 100 MG: 100 TABLET ORAL at 10:11

## 2021-12-07 RX ADMIN — SODIUM CHLORIDE, PRESERVATIVE FREE 10 ML: 5 INJECTION INTRAVENOUS at 20:52

## 2021-12-07 RX ADMIN — FUROSEMIDE 40 MG: 40 INJECTION, SOLUTION INTRAMUSCULAR; INTRAVENOUS at 13:22

## 2021-12-07 RX ADMIN — IPRATROPIUM BROMIDE AND ALBUTEROL SULFATE 1 AMPULE: .5; 3 SOLUTION RESPIRATORY (INHALATION) at 20:35

## 2021-12-07 RX ADMIN — ENOXAPARIN SODIUM 40 MG: 100 INJECTION SUBCUTANEOUS at 10:11

## 2021-12-07 RX ADMIN — POTASSIUM CHLORIDE 20 MEQ: 1500 TABLET, EXTENDED RELEASE ORAL at 10:11

## 2021-12-07 ASSESSMENT — PAIN SCALES - GENERAL: PAINLEVEL_OUTOF10: 0

## 2021-12-07 NOTE — PROGRESS NOTES
[facility] [address]    Progress Note    12/6/2021    7:48 PM    Name:   Nicholas Stokes  MRN:     [id number]     Angi:      [de-identified]   Room:   [address] [place], [place] [address]A   Day:  3  Admit Date:  12/3/2021  5:04 PM    PCP:   Cheo Irvin DO  Code Status:  Limited    Subjective:     C/C:   Chief Complaint   Patient presents with    Shortness of Breath     Interval History Status: not changed. Patient was seen and evaluated at bedside this morning. Patient reports that her breathing is about the same as yesterday. Patient denies any new complaint at this time. Was seen this am , pre fluroscopy due to dysphagia, and noted their input. Brief History:     78-year-old female who was admitted with worsening shortness of breath. Patient was noted to be in CHF and COPD exacerbation. Currently on NC    Review of Systems:     Constitutional:  negative for chills, fevers, sweats  Respiratory:  negative for cough, dyspnea on exertion, shortness of breath, wheezing  Cardiovascular:  negative for chest pain, chest pressure/discomfort, lower extremity edema, palpitations  Gastrointestinal:  negative for abdominal pain, constipation, diarrhea, nausea, vomiting  Neurological:  negative for dizziness, headache    Medications:      Allergies:  No Known Allergies    Current Meds:   Scheduled Meds:    levothyroxine  175 mcg Oral Daily    sodium chloride flush  5-40 mL IntraVENous 2 times per day    predniSONE  40 mg Oral Daily    furosemide  20 mg IntraVENous Daily    cefTRIAXone (ROCEPHIN) IV  1,000 mg IntraVENous Q24H    allopurinol  100 mg Oral Daily    amLODIPine  5 mg Oral Daily    aspirin  81 mg Oral Daily    famotidine  20 mg Oral Nightly    folic acid  1 mg Oral Daily    lisinopril  10 mg Oral Daily    metOLazone  5 mg Oral Daily    metoprolol  100 mg Oral BID    potassium chloride  20 mEq Oral Daily    enoxaparin  40 mg SubCUTAneous Daily    ipratropium-albuterol  1 ampule Inhalation Q4H WA     Continuous Infusions:    sodium chloride       PRN Meds: sodium chloride flush, melatonin, sodium chloride, ondansetron **OR** ondansetron, polyethylene glycol, acetaminophen **OR** acetaminophen    Data:     Past Medical History:   has a past medical history of Arthritis, CAD (coronary artery disease), Cerebral artery occlusion with cerebral infarction ([address] Chronic kidney disease, COPD exacerbation (HCC), GERD (gastroesophageal reflux disease), History of blood transfusion, Hyperlipidemia, Hypertension, Movement disorder, Pneumonia, and Thyroid disease. Social History:   reports that she has never smoked. She has never used smokeless tobacco. She reports that she does not drink alcohol and does not use drugs. Family History:   Family History   Problem Relation Age of Onset    Other Mother     Heart Disease Father        Vitals:  /75   Pulse 69   Temp 98.2 °F (36.8 °C) (Oral)   Resp 20   Ht 5' 3\" (1.6 m)   Wt 224 lb 3.3 oz (101.7 kg)   SpO2 94%   BMI 39.72 kg/m²   Temp (24hrs), Av.1 °F (36.7 °C), Min:97.7 °F (36.5 °C), Max:98.3 °F (36.8 °C)    No results for input(s): POCGLU in the last 72 hours. I/O (24Hr):   No intake or output data in the 24 hours ending 21    Labs:  Hematology:  Recent Labs     21  0340 21  0337 21  0357   WBC 10.0 13.2* 10.6   RBC 3.54* 3.47* 3.50*   HGB 11.3* 11.1* 11.1*   HCT 35.4* 35.1* 36.2*   .0* 101.2* 103.4*   MCH 31.9 32.0 31.7   MCHC 31.9* 31.6* 30.7*    163 156   MPV 10.4 10.6 10.7     Chemistry:  Recent Labs     21  0340   *   K 4.9      CO2 25   GLUCOSE 169*   BUN 32*   CREATININE 1.1   ANIONGAP 13.0   LABGLOM 47*   CALCIUM 9.6     No results for input(s): PROT, LABALBU, LABA1C, Q5AKSKR, A8LXNHP, FT4, TSH, AST, ALT, LDH, GGT, ALKPHOS, LABGGT, BILITOT, BILIDIR, AMMONIA, AMYLASE, LIPASE, LACTATE, CHOL, HDL, LDLCHOLESTEROL, CHOLHDLRATIO, TRIG, VLDL, IYF64GF, PHENYTOIN, PHENYF, URICACID, POCGLU in the last 72 hours. ABG:  Lab Results   Component Value Date    PH 7.38 12/03/2021    PCO2 52 12/03/2021    PO2 74 12/03/2021    HCO3 30 12/03/2021    O2SAT 94 12/03/2021     No results found for: SPECIAL  No results found for: CULTURE    Radiology:  XR CHEST PORTABLE    Result Date: 12/3/2021  Cardiomegaly. No other acute cardiopulmonary disease retrocardiac infiltrates cannot be excluded. **This report has been created using voice recognition software. It may contain minor errors which are inherent in voice recognition technology. ** Final report electronically signed by Dr. Evette Luis on 12/3/2021 6:10 PM      Physical Examination:        General appearance:  alert, cooperative and no distress  Mental Status:  oriented to person, place and time and normal affect  Lungs: Crackles appreciated bilaterally  Heart:  regular rate and rhythm, no murmur  Abdomen:  soft, nontender, nondistended, normal bowel sounds, no masses, hepatomegaly, splenomegaly  Extremities:  no edema, redness, tenderness in the calves  Skin:  no gross lesions, rashes, induration    Assessment:        Hospital Problems           Last Modified POA    COPD exacerbation ([address] 12/3/2021 Yes          Plan:        #Acute on Chronic Hypoxic respiratory Failure, multifactorial etiology, likely 2/2 CHFe/COPDe: Pt presented w/worsening of her chronic hypoxic RF, 2L-4LNC requirement normally. Patient is on 2 L of oxygen via nasal cannula this morning- Continue to wean as tolerated w/goal O2sats >90%  - DuoNebs and IS at bedside when needed  -Continue Lasix 20 mg IV daily    #? COPDe likely 2/2 CHFe, less likely infection; Component of ILD?: No official PFTs on file at this time w/multiple attempts to evaluate. Previous CT chests demonstrate cardiomegaly and fibro-emphysematous changes. Previously noted to have mild PHT (group III?) likely contributing to presentation. Received Decadron 6mg in ED along with DuoNebs.  Pt admits to copious mucopurulent sputum production but states it's not a/w cough, more so when she eats. - C/w Prednisone 40mg Daily x5days for suspected COPDe  - C/w treatments as outlined in the above AoCHRF  - Will need PFTs/PSG as an OP on d/c  - No Abx started in ED as it appears unlikely pt has an infection - can consider if pt does not improve and continually requires      #? Acute on Chronic HFpEF, EF 55% 2018, G2DD: No current Echo, b/l LE edema, obese and not definitive evaluation of edema in hips, etc.. No apparent JVD. Pro-BNP 4260, 2x the baseline values previously. Taking Lopressor for A-fib, Lisinopril, and Zaroxolyn at home. Unsure of current LV function, after Echo can reevaluate the need for adjusted GDMT. -     #Elevated troponins likely 2/2 to demand ischemia; Hx of CAD: Elevated on admission 0.018, acute CHF? Pt does not appear volume down at this time and BP was elevated on arrival. HTN, A-fib, and CHF/COPD contributing to myocardial injury?   - Will order another troponin to trend direction  - EKG demonstrates A-fib, LAD, q-waves in Inferior and anteroseptal leads, no signs of Ischemia   - Has a Hx of CAD, stable on ASA, could theoretically stop ASA and continue w/Eliquis given her A-fib (AFIRE trial)     UTI   -Started patient on Rocephin     #Chronic Atrial Fib not on [address] 2/2 IVH in 2020: Follows with Dr. Jack Sanchez showing in atrial fibrillation with a controlled ventricular rate; Pt is on Lopressor 100mg BID. Eliquis was stopped after IVH due to mechanical fall in 2020. Was supposed to discuss w/PCP and  on whether to restart or not, given the risk for strokes. [id number] score 7 (Stroke/TIA/Systemic embolism risk of 11.2-15.7%/year) and HAS-BLED score at least 4, High risk of Bleeding. Benefits-to-risks discussion can be had w/pt again.  Hx of SVT s/p ablation.   - Candidate for Ablation or Watchman?   - EKG continues to show A-fib w/adequate ventricular response  - C/w Lopressor 100mg BID      #CKD Stage IIIa w/baseline Cr: Hx of CKD and episodes of TYRON/UTIs in the past. Has been seen by Nephrology in clinic. Seems at Baseline, continue to monitor fluid status for need of diuretics vs. Fluids.     #Essential hypertension, uncontrolled: Pt is on Lopressor, Zaroxolyn, lisinopril, Norvasc; monitor for lability. Can adjust as needed based on current BP. Hold parameters on medications.      #Hx of CVA at 20yo (Subjective) and IVH 2020: Residual chronic right-sided weakness from a previous of stroke that occurred more than 50 years ago. She does use a walker at home to ambulate and wears a leg brace on her right leg.  Mechanical fall in 2020 -> IVH, seen by Neuro, had dysphagia, noted and appreciate speech   Input, today had MBS noted the results per dr. Arcelia Gandara     #Hx of Hypothyroidism: C/w home Synthroid     #Hx of GERD: C/w Home Pepcid, no PPI given her hx of CKD and recurrent TYRON     #Hx of Anxiety: C/w home Celexa    Electronically signed by Dennis Swanson MD on 12/6/2021 at 7:57 PM

## 2021-12-07 NOTE — PROGRESS NOTES
telemetry      CKD Stage IIIa  - At baseline  - BMP in am  - Monitor closely with increased diuretic usage    Leukocytosis  - Likely secondary to steroid use  - No fevers. Stopped steroids today  - CBC in am    Primary hypertension  - Blood pressures improved  - Continue Lopressor, Lisinopril, Norvasc with hold parameters     History of CVA with residual right-sided weakness  - Uses walker at home to ambulate / wears leg brace    History of Intraventricular hemorrhage after mechanical fall (2020)  - Noted    Hypothyroidism  - Continue Synthroid     GERD  - Continue home Pepcid      Chief Complaint: shortness of breath    Hospital Course:   Per H&P, \"Katharine Brown is a 78 y.o. female w/siginificant PMHx of HTN, CAD, A fib not on [address], HLD, CKD, hx of CVA w/residual b/l LE weakness, Intracranial hemorrhage 2/2 to mechanical fall in 2020, Hypothyroidism, COPD, and chronic hypoxic respiratory failure who presented to the [facility] ED via EMS from the [address] [place] [address] for progressively worsening SOB, nasal congestion, non-productive cough, and mucopurulent sputum over the course of approximately 1 week. Pt states that she began having a greater difficulty breathing last night that acutely worsened her SOB to where she felt air hungry and anxious. Pt normally on [facility] and was increased to [facility] for the SOB and was noted to have an O2 sat of 84% at the NH while this incident was occurring. Pt denies CP, abdominal pain, fever/chills/rigors, N/V.      ED course: Presented via EMS, O2 sats in 80's on RA 4LNC O2 sat 84%, placed on NRB at 10L then weaned back down to 4LNC, /85, RR 24. Noted to have Trops x1 @ 0.018, pro-BNP 4260, ABG demonstrates pH 7.38, pCO2 52, pO2 74, HCO3 30 Respiratory Acidosis w/full metabolic compensation. COVID-19 and Influenza A/B negative, procal 0.09, WBC 10.9, LA wnl. Received Decadron and DuoNebs in ED.  Being admitted for further evaluation and w/u.      Of note, pt becoming a little dry, Na increasing, BUN and Cr increasing, Serum Osm >300, GFR decreasing, ordered urine Na / Osm and started pt on NS @75cc/hr. Pt appears to be have a sensitive and labile fluid status and don't want to aggravate her HTN as well. Will continue to monitor. Pt weaned down to [place] w/sats >92% at this time. \"    Subjective:  Patient seen and examined. No acute events overnight and in no acute distress. Patient sitting up in bed with  and son present at bedside. She reports feeling confused this am and not knowing where she was.  states he suspects she has underlying dementia and noticed changes in memory over the last few weeks. Patient denies chest pain, shortness of breath, nausea/vomiting, diarrhea, constipation, leg pain. She will need PreCert to go back to [address] [place] [address]. Medications:  Reviewed    Infusion Medications    sodium chloride       Scheduled Medications    [START ON 12/8/2021] furosemide  40 mg IntraVENous Daily    doxepin  10 mg Oral Nightly    levothyroxine  175 mcg Oral Daily    sodium chloride flush  5-40 mL IntraVENous 2 times per day    allopurinol  100 mg Oral Daily    amLODIPine  5 mg Oral Daily    aspirin  81 mg Oral Daily    famotidine  20 mg Oral Nightly    folic acid  1 mg Oral Daily    lisinopril  10 mg Oral Daily    metOLazone  5 mg Oral Daily    metoprolol  100 mg Oral BID    potassium chloride  20 mEq Oral Daily    enoxaparin  40 mg SubCUTAneous Daily    ipratropium-albuterol  1 ampule Inhalation Q4H WA     PRN Meds: magnesium sulfate, sodium chloride flush, melatonin, sodium chloride, ondansetron **OR** ondansetron, polyethylene glycol, acetaminophen **OR** acetaminophen      Intake/Output Summary (Last 24 hours) at 12/7/2021 1604  Last data filed at 12/7/2021 1444  Gross per 24 hour   Intake 430 ml   Output 1725 ml   Net -1295 ml       Diet:  ADULT DIET; Dysphagia - Soft and Bite Sized; Low Sodium (2 gm);  Moderately Thick (Honey); 2000 ml    Exam:  BP 119/69   Pulse 55   Temp 98.1 °F (36.7 °C) (Oral)   Resp 18   Ht 5' 3\" (1.6 m)   Wt 224 lb 3.3 oz (101.7 kg)   SpO2 94%   BMI 39.72 kg/m²     Physical Exam  Vitals reviewed. Constitutional:       General: She is not in acute distress. Appearance: Normal appearance. She is obese. She is not ill-appearing. Comments: Chronically ill-appearing. HENT:      Head: Normocephalic and atraumatic. Right Ear: External ear normal.      Left Ear: External ear normal.      Nose: Nose normal.      Mouth/Throat:      Mouth: Mucous membranes are moist.   Eyes:      Conjunctiva/sclera: Conjunctivae normal.   Cardiovascular:      Rate and Rhythm: Normal rate and regular rhythm. Pulses: Normal pulses. Heart sounds: Normal heart sounds. Pulmonary:      Effort: Pulmonary effort is normal.      Breath sounds: Normal breath sounds. Comments: Lungs diminished throughout with bibasilar rales  Abdominal:      General: Abdomen is flat. Bowel sounds are normal. There is no distension. Palpations: Abdomen is soft. Tenderness: There is no abdominal tenderness. Musculoskeletal:      Cervical back: Normal range of motion. Right lower leg: Edema present. Left lower leg: Edema present. Comments: Mild non-pitting LE edema   Skin:     General: Skin is warm and dry. Capillary Refill: Capillary refill takes less than 2 seconds. Findings: No rash. Neurological:      General: No focal deficit present. Mental Status: She is alert and oriented to person, place, and time.       Comments: Slow speech   Psychiatric:         Mood and Affect: Mood normal.         Behavior: Behavior normal.         Labs:   Recent Labs     12/05/21  0337 12/06/21  0357 12/07/21  0816   WBC 13.2* 10.6 11.5*   HGB 11.1* 11.1* 13.1   HCT 35.1* 36.2* 40.6    156 169     Recent Labs     12/07/21  0816      K 4.2   CL 99   CO2 30   BUN 44*   CREATININE 1.1   CALCIUM 9.3     No results for input(s): AST, ALT, BILIDIR, BILITOT, ALKPHOS in the last 72 hours. No results for input(s): INR in the last 72 hours. No results for input(s): Josiah Fernando in the last 72 hours. Urinalysis:      Lab Results   Component Value Date    NITRU POSITIVE 12/04/2021    WBCUA 5-9 12/04/2021    BACTERIA MODERATE 12/04/2021    RBCUA 3-5 12/04/2021    BLOODU NEGATIVE 12/04/2021    SPECGRAV 1.022 12/04/2021    GLUCOSEU NEGATIVE 02/16/2020       Radiology:  Fluoroscopy modified barium swallow with video   Final Result   1. Laryngeal penetration of honey thick, nectar thick, thin and soft barium with aspiration of thin barium. 2. Additional recommendations from the speech therapist will follow. **This report has been created using voice recognition software. It may contain minor errors which are inherent in voice recognition technology. **         Final report electronically signed by Dr. Jarrell Siegel on 12/6/2021 10:39 AM      XR CHEST PORTABLE   Final Result   Cardiomegaly. No other acute cardiopulmonary disease retrocardiac infiltrates cannot be excluded. **This report has been created using voice recognition software. It may contain minor errors which are inherent in voice recognition technology. **      Final report electronically signed by Dr. Madison Boucher on 12/3/2021 6:10 PM          Diet: ADULT DIET; Dysphagia - Soft and Bite Sized; Low Sodium (2 gm);  Moderately Thick (Honey); 2000 ml    DVT prophylaxis: [x] Lovenox                                 [] SCDs                                 [] SQ Heparin                                 [] Encourage ambulation           [] Already on Anticoagulation     Disposition:    [] Home       [] TCU       [] Rehab       [] Psych       [x] SNF       [] [place]       [] Other-    Code Status: Limited    PT/OT Eval Status: on board      Electronically signed by Lili Goodman MD on 12/7/2021 at 4:04 PM

## 2021-12-07 NOTE — CARE COORDINATION
Discharge Planning Update:  MBS completed, ST following, dysphagia soft diet with honey thick liquids-1:1 feed,  Lovenox, IV Lasix daily started, oxygen, incentive spirometry, acapella, SCD's, up with assistance.  Gloria Barrientos is from [employer], plan on her return with continued ST.

## 2021-12-08 LAB
ANION GAP SERPL CALCULATED.3IONS-SCNC: 14 MEQ/L (ref 8–16)
BASOPHILS # BLD: 0.4 %
BASOPHILS ABSOLUTE: 0 THOU/MM3 (ref 0–0.1)
BUN BLDV-MCNC: 58 MG/DL (ref 7–22)
CALCIUM SERPL-MCNC: 9.2 MG/DL (ref 8.5–10.5)
CHLORIDE BLD-SCNC: 101 MEQ/L (ref 98–111)
CO2: 28 MEQ/L (ref 23–33)
CREAT SERPL-MCNC: 1.5 MG/DL (ref 0.4–1.2)
EOSINOPHIL # BLD: 1.1 %
EOSINOPHILS ABSOLUTE: 0.1 THOU/MM3 (ref 0–0.4)
ERYTHROCYTE [DISTWIDTH] IN BLOOD BY AUTOMATED COUNT: 14.5 % (ref 11.5–14.5)
ERYTHROCYTE [DISTWIDTH] IN BLOOD BY AUTOMATED COUNT: 53.6 FL (ref 35–45)
GFR SERPL CREATININE-BSD FRML MDRD: 33 ML/MIN/1.73M2
GLUCOSE BLD-MCNC: 83 MG/DL (ref 70–108)
HCT VFR BLD CALC: 40.9 % (ref 37–47)
HEMOGLOBIN: 12.7 GM/DL (ref 12–16)
IMMATURE GRANS (ABS): 0.13 THOU/MM3 (ref 0–0.07)
IMMATURE GRANULOCYTES: 1.2 %
LYMPHOCYTES # BLD: 20.7 %
LYMPHOCYTES ABSOLUTE: 2.2 THOU/MM3 (ref 1–4.8)
MAGNESIUM: 2.5 MG/DL (ref 1.6–2.4)
MCH RBC QN AUTO: 31.5 PG (ref 26–33)
MCHC RBC AUTO-ENTMCNC: 31.1 GM/DL (ref 32.2–35.5)
MCV RBC AUTO: 101.5 FL (ref 81–99)
MONOCYTES # BLD: 9.6 %
MONOCYTES ABSOLUTE: 1 THOU/MM3 (ref 0.4–1.3)
NUCLEATED RED BLOOD CELLS: 0 /100 WBC
PLATELET # BLD: 153 THOU/MM3 (ref 130–400)
PLATELET ESTIMATE: ABNORMAL
PMV BLD AUTO: 11 FL (ref 9.4–12.4)
POTASSIUM SERPL-SCNC: 4.7 MEQ/L (ref 3.5–5.2)
RBC # BLD: 4.03 MILL/MM3 (ref 4.2–5.4)
REASON FOR REJECTION: NORMAL
REJECTED TEST: NORMAL
SCAN OF BLOOD SMEAR: NORMAL
SEG NEUTROPHILS: 67 %
SEGMENTED NEUTROPHILS ABSOLUTE COUNT: 7 THOU/MM3 (ref 1.8–7.7)
SODIUM BLD-SCNC: 143 MEQ/L (ref 135–145)
WBC # BLD: 10.5 THOU/MM3 (ref 4.8–10.8)

## 2021-12-08 PROCEDURE — 94640 AIRWAY INHALATION TREATMENT: CPT

## 2021-12-08 PROCEDURE — 6370000000 HC RX 637 (ALT 250 FOR IP): Performed by: STUDENT IN AN ORGANIZED HEALTH CARE EDUCATION/TRAINING PROGRAM

## 2021-12-08 PROCEDURE — 6370000000 HC RX 637 (ALT 250 FOR IP): Performed by: HOSPITALIST

## 2021-12-08 PROCEDURE — 94760 N-INVAS EAR/PLS OXIMETRY 1: CPT

## 2021-12-08 PROCEDURE — 2700000000 HC OXYGEN THERAPY PER DAY

## 2021-12-08 PROCEDURE — 2140000000 HC CCU INTERMEDIATE R&B

## 2021-12-08 PROCEDURE — 92526 ORAL FUNCTION THERAPY: CPT | Performed by: SPEECH-LANGUAGE PATHOLOGIST

## 2021-12-08 PROCEDURE — 6360000002 HC RX W HCPCS: Performed by: STUDENT IN AN ORGANIZED HEALTH CARE EDUCATION/TRAINING PROGRAM

## 2021-12-08 PROCEDURE — 80048 BASIC METABOLIC PNL TOTAL CA: CPT

## 2021-12-08 PROCEDURE — 85025 COMPLETE CBC W/AUTO DIFF WBC: CPT

## 2021-12-08 PROCEDURE — 2580000003 HC RX 258: Performed by: STUDENT IN AN ORGANIZED HEALTH CARE EDUCATION/TRAINING PROGRAM

## 2021-12-08 PROCEDURE — 99232 SBSQ HOSP IP/OBS MODERATE 35: CPT | Performed by: FAMILY MEDICINE

## 2021-12-08 PROCEDURE — 83735 ASSAY OF MAGNESIUM: CPT

## 2021-12-08 PROCEDURE — 36415 COLL VENOUS BLD VENIPUNCTURE: CPT

## 2021-12-08 RX ORDER — BUDESONIDE 0.25 MG/2ML
250 INHALANT ORAL 2 TIMES DAILY
Status: DISCONTINUED | OUTPATIENT
Start: 2021-12-08 | End: 2021-12-11 | Stop reason: HOSPADM

## 2021-12-08 RX ORDER — HEPARIN SODIUM 5000 [USP'U]/ML
5000 INJECTION, SOLUTION INTRAVENOUS; SUBCUTANEOUS EVERY 8 HOURS SCHEDULED
Status: DISCONTINUED | OUTPATIENT
Start: 2021-12-09 | End: 2021-12-11 | Stop reason: HOSPADM

## 2021-12-08 RX ORDER — CEFDINIR 300 MG/1
300 CAPSULE ORAL EVERY 12 HOURS SCHEDULED
Status: DISCONTINUED | OUTPATIENT
Start: 2021-12-08 | End: 2021-12-11

## 2021-12-08 RX ADMIN — ENOXAPARIN SODIUM 40 MG: 100 INJECTION SUBCUTANEOUS at 10:12

## 2021-12-08 RX ADMIN — IPRATROPIUM BROMIDE AND ALBUTEROL SULFATE 1 AMPULE: .5; 3 SOLUTION RESPIRATORY (INHALATION) at 16:54

## 2021-12-08 RX ADMIN — IPRATROPIUM BROMIDE AND ALBUTEROL SULFATE 1 AMPULE: .5; 3 SOLUTION RESPIRATORY (INHALATION) at 09:07

## 2021-12-08 RX ADMIN — SODIUM CHLORIDE, PRESERVATIVE FREE 10 ML: 5 INJECTION INTRAVENOUS at 10:13

## 2021-12-08 RX ADMIN — METOLAZONE 5 MG: 5 TABLET ORAL at 10:14

## 2021-12-08 RX ADMIN — LEVOTHYROXINE SODIUM 175 MCG: 0.15 TABLET ORAL at 10:14

## 2021-12-08 RX ADMIN — FAMOTIDINE 20 MG: 20 TABLET ORAL at 20:32

## 2021-12-08 RX ADMIN — ASPIRIN 81 MG: 81 TABLET, COATED ORAL at 10:14

## 2021-12-08 RX ADMIN — CEFDINIR 300 MG: 300 CAPSULE ORAL at 20:32

## 2021-12-08 RX ADMIN — DOXEPIN HYDROCHLORIDE 10 MG: 10 CAPSULE ORAL at 20:32

## 2021-12-08 RX ADMIN — POTASSIUM CHLORIDE 20 MEQ: 1500 TABLET, EXTENDED RELEASE ORAL at 10:16

## 2021-12-08 RX ADMIN — LISINOPRIL 10 MG: 10 TABLET ORAL at 10:14

## 2021-12-08 RX ADMIN — AMLODIPINE BESYLATE 5 MG: 5 TABLET ORAL at 10:14

## 2021-12-08 RX ADMIN — METOPROLOL TARTRATE 100 MG: 100 TABLET, FILM COATED ORAL at 20:32

## 2021-12-08 RX ADMIN — METOPROLOL TARTRATE 100 MG: 100 TABLET, FILM COATED ORAL at 10:13

## 2021-12-08 RX ADMIN — FOLIC ACID 1 MG: 1 TABLET ORAL at 10:14

## 2021-12-08 RX ADMIN — ALLOPURINOL 100 MG: 100 TABLET ORAL at 10:14

## 2021-12-08 RX ADMIN — IPRATROPIUM BROMIDE AND ALBUTEROL SULFATE 1 AMPULE: .5; 3 SOLUTION RESPIRATORY (INHALATION) at 12:03

## 2021-12-08 RX ADMIN — SODIUM CHLORIDE, PRESERVATIVE FREE 10 ML: 5 INJECTION INTRAVENOUS at 20:32

## 2021-12-08 RX ADMIN — ACETAMINOPHEN 650 MG: 325 TABLET ORAL at 20:32

## 2021-12-08 RX ADMIN — Medication 1.5 MG: at 20:32

## 2021-12-08 RX ADMIN — FUROSEMIDE 40 MG: 40 INJECTION, SOLUTION INTRAMUSCULAR; INTRAVENOUS at 10:12

## 2021-12-08 ASSESSMENT — PAIN SCALES - GENERAL
PAINLEVEL_OUTOF10: 0
PAINLEVEL_OUTOF10: 3

## 2021-12-08 NOTE — PROGRESS NOTES
PROGRESS NOTE      Patient:  Connor Lunger      Unit/Bed:3B-31/031-A    YOB: 1940    MRN: [id number]       Acct: [de-identified]     PCP: Kathryn Tan DO    Date of Admission: 12/3/2021      Assessment/Plan:        Active Hospital Problems    Diagnosis Date Noted    COPD exacerbation ([facility] [address] [J44.1] 12/03/2021     Acute on chronic hypoxic respiratory failure  - Presented with worsening of chronic hypoxia - normal 2-4L NC, but needed 6L on admission. She is currently on 4L  - Suspect this is more due to CHF exacerbation vs. COPD vs. Infection   - Discontinued Prednisone as it will worsen fluid retention and does not appear to be COPD exacerbation. No indication to continue antibiotics at this time either.   - Received Lasix 40 mg IV daily x 2 --> discontinued now due to TYRON  - Continue to wean oxygen as tolerated to maintain SpO2 > 90%  - DuoNebs Q4H while awake. Budesonide Nebulizer BID. IS at bedside.  - May benefit from PFTs / sleep study as outpatient as discharge    Acute on chronic HFpEF  - ProBNP 4260 on admission, which is 2x previous baseline values  - Echo 90/6: normal systolic function. EF 55-60%. Grade III diastolic dysfunction. Severe bi-atrial enlargement. Mild mitral and tricuspid regurg. Moderate pulm htn. - Continue Lopressor --> will hold Lisinopril and Zaroxoyln due to TYRON  - Received Lasix 40 mg IV daily x 2 --> discontinued now due to TYRON  - Fluid restrict. Low salt diet. Strict I&O. Daily weights. TYRON CKD Stage IIIa  - Creatinine increased today, likely due to increased diuresis  - Holding Lisinopril, Allopurinol, Zaroxolyn due to TYRON   - BMP in am    Acute uncomplicated cystitis  - UA with trace leukocyte esterase, positive nitrites, mod. Bacteria, 5-9 WBC  - Resend UA for culture  - Previously on Rocephin.  Transitioned to Omnicef BID    Elevated troponin / CAD  - Likely due to demand ischemia and CHF exacerbation  - Denies chest pain  - Continue aspirin   - Maintain telemetry. Monitor for chest pain     Chronic atrial fibrillation  - Not on anticoagulation due to IVF after mechanical fall in 2020  - [id number] = 7; however HAS-BLED at least 4  - Continue Lopressor 100 mg BID  - Maintain telemetry      Leukocytosis, resolved  - Likely secondary to steroid use  - No fevers. Stopped steroids today  - CBC in am    Primary hypertension  - Blood pressures labile  - Continue Lopressor and Norvasc with hold parameters - hold Lisinopril due to TYRON  - May need Hydralazine if TYRON persists     History of CVA with residual right-sided weakness  - Uses walker at home to ambulate / wears leg brace   - PT/OT    History of Intraventricular hemorrhage after mechanical fall (2020)  - Noted    Hypothyroidism  - Continue Synthroid     GERD  - Continue home Pepcid    Gout  - Holding home Allopurinol due to TYRON    Chief Complaint: shortness of breath    Hospital Course:   Per H&P, \"Katharine Lara is a 78 y.o. female w/siginificant PMHx of HTN, CAD, A fib not on [address] [place] [address] Road, HLD, CKD, hx of CVA w/residual b/l LE weakness, Intracranial hemorrhage 2/2 to mechanical fall in 2020, Hypothyroidism, COPD, and chronic hypoxic respiratory failure who presented to the [facility] ED via EMS from the [address] [place] [address] for progressively worsening SOB, nasal congestion, non-productive cough, and mucopurulent sputum over the course of approximately 1 week. Pt states that she began having a greater difficulty breathing last night that acutely worsened her SOB to where she felt air hungry and anxious. [facility] and [facility] and was noted to have an O2 sat of 84% at the NH while this incident was occurring. Pt denies CP, abdominal pain, fever/chills/rigors, N/V.      ED course: Presented via EMS, O2 sats in 80's on RA 4LNC O2 sat 84%, placed on NRB at 10L then weaned back down to 4LNC, /85, RR 24.  Noted to have Trops x1 @ 0.018, pro-BNP 4260, ABG demonstrates pH 7.38, pCO2 52, pO2 74, HCO3 30 Respiratory Acidosis w/full metabolic compensation. COVID-19 and Influenza A/B negative, procal 0.09, WBC 10.9, LA wnl. Received Decadron and DuoNebs in ED. Being admitted for further evaluation and w/u.      Of note, pt becoming a little dry, Na increasing, BUN and Cr increasing, Serum Osm >300, GFR decreasing, ordered urine Na / Osm and started pt on NS @75cc/hr. Pt appears to be have a sensitive and labile fluid status and don't want to aggravate her HTN as well. Will continue to monitor. Pt weaned down to [place] w/sats >92% at this time. \"    Subjective:  Patient seen and examined. No acute events overnight and in no acute distress. She continues to be distressed about being intermittently confused. She was oriented x 4 when asked. She states she slept very well last night. She reports overall improvement in breathing today. She denies chest pain, shortness of breath, nausea/vomiting, diarrhea, constipation, leg pain.       Medications:  Reviewed    Infusion Medications    sodium chloride       Scheduled Medications    cefdinir  300 mg Oral 2 times per day    budesonide  250 mcg Nebulization BID    doxepin  10 mg Oral Nightly    levothyroxine  175 mcg Oral Daily    sodium chloride flush  5-40 mL IntraVENous 2 times per day    [Held by provider] allopurinol  100 mg Oral Daily    amLODIPine  5 mg Oral Daily    aspirin  81 mg Oral Daily    famotidine  20 mg Oral Nightly    folic acid  1 mg Oral Daily    [Held by provider] lisinopril  10 mg Oral Daily    metOLazone  5 mg Oral Daily    metoprolol  100 mg Oral BID    potassium chloride  20 mEq Oral Daily    enoxaparin  40 mg SubCUTAneous Daily    ipratropium-albuterol  1 ampule Inhalation Q4H WA     PRN Meds: magnesium sulfate, sodium chloride flush, melatonin, sodium chloride, ondansetron **OR** ondansetron, polyethylene glycol, acetaminophen **OR** acetaminophen      Intake/Output Summary (Last 24 hours) at 12/8/2021 1645  Last data filed at 12/8/2021 1430  Gross per 24 hour   Intake 480 ml   Output 2050 ml   Net -1570 ml       Diet:  ADULT DIET; Dysphagia - Soft and Bite Sized; Low Sodium (2 gm); Moderately Thick (Honey); 2000 ml    Exam:  /69   Pulse 63   Temp 97.5 °F (36.4 °C) (Oral)   Resp 18   Ht 5' 3\" (1.6 m)   Wt 224 lb 3.3 oz (101.7 kg)   SpO2 93%   BMI 39.72 kg/m²     Physical Exam  Vitals reviewed. Constitutional:       General: She is not in acute distress. Appearance: Normal appearance. She is obese. She is not ill-appearing. Comments: Chronically ill-appearing. HENT:      Head: Normocephalic and atraumatic. Right Ear: External ear normal.      Left Ear: External ear normal.      Nose: Nose normal.      Mouth/Throat:      Mouth: Mucous membranes are moist.   Eyes:      Conjunctiva/sclera: Conjunctivae normal.   Cardiovascular:      Rate and Rhythm: Normal rate and regular rhythm. Pulses: Normal pulses. Heart sounds: Normal heart sounds. Pulmonary:      Effort: Pulmonary effort is normal. No respiratory distress. Breath sounds: Normal breath sounds. No wheezing or rhonchi. Comments: Decreased air entry bilaterally with lung sounds diminished throughout all lung fields. Abdominal:      General: Abdomen is flat. Bowel sounds are normal. There is no distension. Palpations: Abdomen is soft. Tenderness: There is no abdominal tenderness. Musculoskeletal:      Cervical back: Normal range of motion. Right lower leg: Edema present. Left lower leg: Edema present. Comments: Mild non-pitting LE edema   Skin:     General: Skin is warm and dry. Capillary Refill: Capillary refill takes less than 2 seconds. Findings: No rash. Neurological:      General: No focal deficit present. Mental Status: She is alert and oriented to person, place, and time.       Comments: Slow speech   Psychiatric:         Mood and Affect: Mood normal.         Behavior: Behavior normal.         Labs:   Recent Labs     12/06/21  0357 12/07/21  0816 12/08/21  0349   WBC 10.6 11.5* 10.5   HGB 11.1* 13.1 12.7   HCT 36.2* 40.6 40.9    169 153     Recent Labs     12/07/21  0816 12/08/21  0550    143   K 4.2 4.7   CL 99 101   CO2 30 28   BUN 44* 58*   CREATININE 1.1 1.5*   CALCIUM 9.3 9.2     No results for input(s): AST, ALT, BILIDIR, BILITOT, ALKPHOS in the last 72 hours. No results for input(s): INR in the last 72 hours. No results for input(s): [place] in the last 72 hours. Urinalysis:      Lab Results   Component Value Date    NITRU POSITIVE 12/04/2021    WBCUA 5-9 12/04/2021    BACTERIA MODERATE 12/04/2021    RBCUA 3-5 12/04/2021    BLOODU NEGATIVE 12/04/2021    SPECGRAV 1.022 12/04/2021    GLUCOSEU NEGATIVE 02/16/2020       Radiology:  Fluoroscopy modified barium swallow with video   Final Result   1. Laryngeal penetration of honey thick, nectar thick, thin and soft barium with aspiration of thin barium. 2. Additional recommendations from the speech therapist will follow. **This report has been created using voice recognition software. It may contain minor errors which are inherent in voice recognition technology. **         Final report electronically signed by Dr. Kenya Dow on 12/6/2021 10:39 AM      XR CHEST PORTABLE   Final Result   Cardiomegaly. No other acute cardiopulmonary disease retrocardiac infiltrates cannot be excluded. **This report has been created using voice recognition software. It may contain minor errors which are inherent in voice recognition technology. **      Final report electronically signed by Dr. Lincoln Lockett on 12/3/2021 6:10 PM          Diet: ADULT DIET; Dysphagia - Soft and Bite Sized; Low Sodium (2 gm);  Moderately Thick (Honey); 2000 ml    DVT prophylaxis: [x] Lovenox                                 [] SCDs                                 [] SQ Heparin                                 [] Encourage

## 2021-12-08 NOTE — RT PROTOCOL NOTE
RT Inhaler-Nebulizer Bronchodilator Protocol Note    There is a bronchodilator order in the chart from a provider indicating to follow the RT Bronchodilator Protocol and there is an Initiate RT Inhaler-Nebulizer Bronchodilator Protocol order as well (see protocol at bottom of note). CXR Findings:  No results found. The findings from the last RT Protocol Assessment were as follows:   History Pulmonary Disease: Chronic pulmonary disease  Respiratory Pattern: Dyspnea on exertion or RR 21-25 bpm  Breath Sounds: Intermittent or unilateral wheezes  Cough: Weak, non-productive  Indication for Bronchodilator Therapy: Decreased or absent breath sounds  Bronchodilator Assessment Score: 11    Aerosolized bronchodilator medication orders have been revised according to the RT Inhaler-Nebulizer Bronchodilator Protocol below. Respiratory Therapist to perform RT Therapy Protocol Assessment initially then follow the protocol. Repeat RT Therapy Protocol Assessment PRN for score 0-3 or on second treatment, BID, and PRN for scores above 3. No Indications - adjust the frequency to every 6 hours PRN wheezing or bronchospasm, if no treatments needed after 48 hours then discontinue using Per Protocol order mode. If indication present, adjust the RT bronchodilator orders based on the Bronchodilator Assessment Score as indicated below. Use Inhaler orders unless patient has one or more of the following: on home nebulizer, not able to hold breath for 10 seconds, is not alert and oriented, cannot activate and use MDI correctly, or respiratory rate 25 breaths per minute or more, then use the equivalent nebulizer order(s) with same Frequency and PRN reasons based on the score. If a patient is on this medication at home then do not decrease Frequency below that used at home.     0-3 - enter or revise RT bronchodilator order(s) to equivalent RT Bronchodilator order with Frequency of every 4 hours PRN for wheezing or increased work of breathing using Per Protocol order mode. 4-6 - enter or revise RT Bronchodilator order(s) to two equivalent RT bronchodilator orders with one order with BID Frequency and one order with Frequency of every 4 hours PRN wheezing or increased work of breathing using Per Protocol order mode. 7-10 - enter or revise RT Bronchodilator order(s) to two equivalent RT bronchodilator orders with one order with TID Frequency and one order with Frequency of every 4 hours PRN wheezing or increased work of breathing using Per Protocol order mode. 11-13 - enter or revise RT Bronchodilator order(s) to one equivalent RT bronchodilator order with QID Frequency and an Albuterol order with Frequency of every 4 hours PRN wheezing or increased work of breathing using Per Protocol order mode. Greater than 13 - enter or revise RT Bronchodilator order(s) to one equivalent RT bronchodilator order with every 4 hours Frequency and an Albuterol order with Frequency of every 2 hours PRN wheezing or increased work of breathing using Per Protocol order mode. RT to enter RT Home Evaluation for COPD & MDI Assessment order using Per Protocol order mode.     Electronically signed by Jerome Ernst RCP on 12/8/2021 at 12:11 PM

## 2021-12-08 NOTE — CARE COORDINATION
Discharge plan update:    Dysphagia soft diet with honey thick liquids1:1 observation while eating, oxygen decreased 4L/min NC, sats 93%. Lasix IV, med nebs, Omnicef BID for UTI, creatinine increased 1.5, IS acapella.  No fevers    Gloria Barrientos is from [employer], plans  return with continued ST.

## 2021-12-08 NOTE — PROGRESS NOTES
[address] [address]  INPATIENT SPEECH THERAPY  [facility] CCU-STEPDOWN 3B  DAILY NOTE    TIME   SLP Individual Minutes  Time In: 1350  Time Out: 9458  Minutes: 23  Timed Code Treatment Minutes: 0 Minutes       Date: 2021  Patient Name: Cleve Mendoza      CSN: [id number]   : 1940  (78 y.o.)  Gender: female   Referring Physician:  Benjamin Villar MD  Diagnosis: COPD Exacerbation  Secondary Diagnosis: Dysphagia  Precautions: fall risk, aspiration precautions  Current Diet: Soft and bite sized diet with moderately thick liquids  Swallowing Strategies: Standard Universal Swallow Precautions and Full Upright Position, Small Bite/Sip, No Straw, Medications Crushed with Puree, Direct 1:1 Supervision and Alternate Solids and Liquids; avoid mixed consistencies   Date of Last MBS/FEES: MBS 21    Pain:  No pain reported. Subjective:  Patient sitting upright in bed upon arrival for treatment. RN called to inquire about ST working with the patient d/t some difficulty noted during lunch this date. Short-Term Goals:  SHORT TERM GOAL #1:  Goal 1: Patient will safely consume a soft and bite sized diet + moderately thick liquids (avoid mixed consistencies) with direct 1:1 supervision while implementing recommended swallow strategies without overt s/s of aspiration to maximize nutrition/hydration levels. INTERVENTIONS:  Patient's RN, Darlin Jimenez, reports the patient was doing some coughing during lunch this date. Reports it seemed to be more when she was eating versus drinking. Reports the patient had chicken salad (no bun/bread), mashed potatoes and pudding. RN reports the patient's lung sounds were diminished this morning, but she has not had a chance to listen to her lungs since she finished lunch. Completed trials of pieces of yonny crackers mixed in with applesauce this session and the patient tolerated 9/10 trials without overt difficulty. Immediate coughing noted on 1/10 trials.   Patient required mod cues for improved mastication as patient with only slight mastication/mashing noted with the first 2 trials. The coughing was noted on the second bite. Patient also completed trials of moderately thick water this session without overt difficulty on 9/10 trials, 1/10 with immediate coughing noted. Patient disliked the thickened liquids. Patient required min cues for improved swallow initiation as well as improved bolus control. Patient required min to mod cues to take small bites and sips throughout all of the above trials. Completed extensive education with the patient on the results of the MBS completed on 12/6/21 and rationale for her current diet. Educated the patient on s/s of aspiration and that aspiration could lead to pneumonia if it continues. Question patient's understanding of the education as she asked if she was allowed to have whatever she wanted to eat at the end of this session. Further education then provided to the patient on the recommended diet, types of foods that she is allowed to have and the rationale for it. Patient verbalized understanding, but will require further education. SHORT TERM GOAL #2:  Goal 2: Patient will complete advanced PO trials to with adeqaute textural breakdown, cohesive bolus formation, timely AP transit and no overt s/s of aspiration to determine readiness for dietary upgrade. INTERVENTIONS:did not test this date d/t PO trials of foods allowed on her current diet since the patient's RN noted some difficulty during lunch. SHORT TERM GOAL #3:  Goal 3: Patient will complete pharyngeal strengthening exercises (effortful, beverly, CTAR) x10 with good success to improve strength of swallow function.   INTERVENTIONS: did not test this date d/t focus on other goals    Long-Term Goals:   No LTG's secondary to short estimated length of stay       EDUCATION:  Learner: Patient  Education:  Reviewed diet and strategies, Reviewed signs, symptoms and risks of aspiration, Reviewed recommendations for follow-up and Education Related to Potential Risks and Complications Due to Impairment/Illness/Injury  Evaluation of Education: Verbalizes understanding, Needs further instruction and Family not present    ASSESSMENT/PLAN:  Activity Tolerance:  Patient tolerance of  treatment: fair. Assessment/Plan: Patient progressing toward established goals. Continues to require skilled care of licensed speech pathologist to progress toward achievement of established goals and plan of care. .     Plan for Next Session: dietary analysis, advanced PO trials, pharyngeal strengthening exercises       Angie Espinoza M.S. [facility]air[facility]clean [id number]

## 2021-12-08 NOTE — CARE COORDINATION
12/8/21, 12:15 PM EST    DISCHARGE PLANNING EVALUATION    Continue plan to return to The [address] [place] [address] at discharge. She will not be a precert. They are ready to accept whenever she is medically stable.

## 2021-12-09 LAB
ANION GAP SERPL CALCULATED.3IONS-SCNC: 16 MEQ/L (ref 8–16)
BASOPHILS # BLD: 0.3 %
BASOPHILS ABSOLUTE: 0 THOU/MM3 (ref 0–0.1)
BILIRUBIN URINE: NEGATIVE
BLOOD, URINE: NEGATIVE
BUN BLDV-MCNC: 73 MG/DL (ref 7–22)
CALCIUM SERPL-MCNC: 9.3 MG/DL (ref 8.5–10.5)
CHARACTER, URINE: CLEAR
CHLORIDE BLD-SCNC: 98 MEQ/L (ref 98–111)
CO2: 25 MEQ/L (ref 23–33)
COLOR: YELLOW
CREAT SERPL-MCNC: 1.8 MG/DL (ref 0.4–1.2)
EOSINOPHIL # BLD: 0.9 %
EOSINOPHILS ABSOLUTE: 0.1 THOU/MM3 (ref 0–0.4)
ERYTHROCYTE [DISTWIDTH] IN BLOOD BY AUTOMATED COUNT: 14.6 % (ref 11.5–14.5)
ERYTHROCYTE [DISTWIDTH] IN BLOOD BY AUTOMATED COUNT: 53.2 FL (ref 35–45)
GFR SERPL CREATININE-BSD FRML MDRD: 27 ML/MIN/1.73M2
GLUCOSE BLD-MCNC: 126 MG/DL (ref 70–108)
GLUCOSE URINE: NEGATIVE MG/DL
HCT VFR BLD CALC: 42.2 % (ref 37–47)
HEMOGLOBIN: 13.4 GM/DL (ref 12–16)
IMMATURE GRANS (ABS): 0.17 THOU/MM3 (ref 0–0.07)
IMMATURE GRANULOCYTES: 1.2 %
KETONES, URINE: NEGATIVE
LEUKOCYTE ESTERASE, URINE: NEGATIVE
LYMPHOCYTES # BLD: 10.6 %
LYMPHOCYTES ABSOLUTE: 1.5 THOU/MM3 (ref 1–4.8)
MAGNESIUM: 2.1 MG/DL (ref 1.6–2.4)
MCH RBC QN AUTO: 31.8 PG (ref 26–33)
MCHC RBC AUTO-ENTMCNC: 31.8 GM/DL (ref 32.2–35.5)
MCV RBC AUTO: 100.2 FL (ref 81–99)
MONOCYTES # BLD: 9.9 %
MONOCYTES ABSOLUTE: 1.4 THOU/MM3 (ref 0.4–1.3)
NITRITE, URINE: NEGATIVE
NUCLEATED RED BLOOD CELLS: 0 /100 WBC
PH UA: 5.5 (ref 5–9)
PLATELET # BLD: 183 THOU/MM3 (ref 130–400)
PMV BLD AUTO: 10.7 FL (ref 9.4–12.4)
POTASSIUM SERPL-SCNC: 3.9 MEQ/L (ref 3.5–5.2)
PRO-BNP: 1655 PG/ML (ref 0–1800)
PROTEIN UA: NEGATIVE
RBC # BLD: 4.21 MILL/MM3 (ref 4.2–5.4)
SEG NEUTROPHILS: 77.1 %
SEGMENTED NEUTROPHILS ABSOLUTE COUNT: 10.9 THOU/MM3 (ref 1.8–7.7)
SODIUM BLD-SCNC: 139 MEQ/L (ref 135–145)
SPECIFIC GRAVITY, URINE: 1.01 (ref 1–1.03)
UROBILINOGEN, URINE: 0.2 EU/DL (ref 0–1)
WBC # BLD: 14.1 THOU/MM3 (ref 4.8–10.8)

## 2021-12-09 PROCEDURE — 83735 ASSAY OF MAGNESIUM: CPT

## 2021-12-09 PROCEDURE — 36415 COLL VENOUS BLD VENIPUNCTURE: CPT

## 2021-12-09 PROCEDURE — 80048 BASIC METABOLIC PNL TOTAL CA: CPT

## 2021-12-09 PROCEDURE — 94760 N-INVAS EAR/PLS OXIMETRY 1: CPT

## 2021-12-09 PROCEDURE — 6370000000 HC RX 637 (ALT 250 FOR IP): Performed by: STUDENT IN AN ORGANIZED HEALTH CARE EDUCATION/TRAINING PROGRAM

## 2021-12-09 PROCEDURE — 2700000000 HC OXYGEN THERAPY PER DAY

## 2021-12-09 PROCEDURE — 94669 MECHANICAL CHEST WALL OSCILL: CPT

## 2021-12-09 PROCEDURE — 6360000002 HC RX W HCPCS: Performed by: STUDENT IN AN ORGANIZED HEALTH CARE EDUCATION/TRAINING PROGRAM

## 2021-12-09 PROCEDURE — 85025 COMPLETE CBC W/AUTO DIFF WBC: CPT

## 2021-12-09 PROCEDURE — 2580000003 HC RX 258: Performed by: STUDENT IN AN ORGANIZED HEALTH CARE EDUCATION/TRAINING PROGRAM

## 2021-12-09 PROCEDURE — 94640 AIRWAY INHALATION TREATMENT: CPT

## 2021-12-09 PROCEDURE — 2140000000 HC CCU INTERMEDIATE R&B

## 2021-12-09 PROCEDURE — 83880 ASSAY OF NATRIURETIC PEPTIDE: CPT

## 2021-12-09 PROCEDURE — 99232 SBSQ HOSP IP/OBS MODERATE 35: CPT | Performed by: FAMILY MEDICINE

## 2021-12-09 PROCEDURE — 92526 ORAL FUNCTION THERAPY: CPT

## 2021-12-09 PROCEDURE — 6370000000 HC RX 637 (ALT 250 FOR IP): Performed by: HOSPITALIST

## 2021-12-09 PROCEDURE — 81003 URINALYSIS AUTO W/O SCOPE: CPT

## 2021-12-09 RX ADMIN — Medication 1.5 MG: at 19:51

## 2021-12-09 RX ADMIN — HEPARIN SODIUM 5000 UNITS: 5000 INJECTION INTRAVENOUS; SUBCUTANEOUS at 06:31

## 2021-12-09 RX ADMIN — FOLIC ACID 1 MG: 1 TABLET ORAL at 09:57

## 2021-12-09 RX ADMIN — CEFDINIR 300 MG: 300 CAPSULE ORAL at 19:51

## 2021-12-09 RX ADMIN — IPRATROPIUM BROMIDE AND ALBUTEROL SULFATE 1 AMPULE: .5; 3 SOLUTION RESPIRATORY (INHALATION) at 22:53

## 2021-12-09 RX ADMIN — FAMOTIDINE 20 MG: 20 TABLET ORAL at 19:51

## 2021-12-09 RX ADMIN — BUDESONIDE 250 MCG: 0.25 INHALANT RESPIRATORY (INHALATION) at 07:50

## 2021-12-09 RX ADMIN — HEPARIN SODIUM 5000 UNITS: 5000 INJECTION INTRAVENOUS; SUBCUTANEOUS at 13:16

## 2021-12-09 RX ADMIN — DOXEPIN HYDROCHLORIDE 10 MG: 10 CAPSULE ORAL at 19:51

## 2021-12-09 RX ADMIN — BUDESONIDE 250 MCG: 0.25 INHALANT RESPIRATORY (INHALATION) at 17:56

## 2021-12-09 RX ADMIN — ASPIRIN 81 MG: 81 TABLET, COATED ORAL at 09:57

## 2021-12-09 RX ADMIN — IPRATROPIUM BROMIDE AND ALBUTEROL SULFATE 1 AMPULE: .5; 3 SOLUTION RESPIRATORY (INHALATION) at 13:48

## 2021-12-09 RX ADMIN — IPRATROPIUM BROMIDE AND ALBUTEROL SULFATE 1 AMPULE: .5; 3 SOLUTION RESPIRATORY (INHALATION) at 07:50

## 2021-12-09 RX ADMIN — SODIUM CHLORIDE, PRESERVATIVE FREE 10 ML: 5 INJECTION INTRAVENOUS at 09:57

## 2021-12-09 RX ADMIN — IPRATROPIUM BROMIDE AND ALBUTEROL SULFATE 1 AMPULE: .5; 3 SOLUTION RESPIRATORY (INHALATION) at 17:55

## 2021-12-09 RX ADMIN — SODIUM CHLORIDE, PRESERVATIVE FREE 5 ML: 5 INJECTION INTRAVENOUS at 19:51

## 2021-12-09 RX ADMIN — HEPARIN SODIUM 5000 UNITS: 5000 INJECTION INTRAVENOUS; SUBCUTANEOUS at 19:51

## 2021-12-09 RX ADMIN — CEFDINIR 300 MG: 300 CAPSULE ORAL at 09:59

## 2021-12-09 RX ADMIN — POTASSIUM CHLORIDE 20 MEQ: 1500 TABLET, EXTENDED RELEASE ORAL at 09:57

## 2021-12-09 RX ADMIN — LEVOTHYROXINE SODIUM 175 MCG: 0.15 TABLET ORAL at 09:57

## 2021-12-09 RX ADMIN — ACETAMINOPHEN 650 MG: 325 TABLET ORAL at 19:51

## 2021-12-09 ASSESSMENT — PAIN SCALES - GENERAL
PAINLEVEL_OUTOF10: 0
PAINLEVEL_OUTOF10: 3
PAINLEVEL_OUTOF10: 0

## 2021-12-09 NOTE — CARE COORDINATION
Discharge Planning Update:    Hospitalist following. Requiring 4L NC (sats in low 90s). Creatinine 1.8, burn 73. WBC 14.1. ASA. Omnicef. Nebs. Plans [facility] at discharge.

## 2021-12-09 NOTE — CARE COORDINATION
12/9/21, 10:55 AM EST    DISCHARGE PLANNING EVALUATION    Left a message for Marsha at The [address] [place] [address] to make her aware Angelika Rice will not be discharged today.

## 2021-12-09 NOTE — PROGRESS NOTES
PROGRESS NOTE      Patient:  Davey Kawasaki      Unit/Bed:3B-31/031-A    YOB: 1940    MRN: [id number]       Acct: [de-identified]     PCP: Yobany Larsen DO    Date of Admission: 12/3/2021      Assessment/Plan:        Active Hospital Problems    Diagnosis Date Noted    Acute on chronic respiratory failure with hypoxia ([address] [J96.21]     COPD exacerbation ([address] [J44.1] 12/03/2021    Acute on chronic diastolic congestive heart failure (HCC) [I50.33]      Acute on chronic hypoxic respiratory failure  - Presented with worsening of chronic hypoxia - normal 2-4L NC, but needed 6L on admission. She is currently on 3L  - Suspect this is more due to CHF exacerbation vs. COPD vs. Infection   - Discontinued Prednisone as it will worsen fluid retention and does not appear to be COPD exacerbation. No indication to continue antibiotics at this time either.   - Received Lasix 40 mg IV daily x 2 --> discontinued now due to TYRON  - Continue to wean oxygen as tolerated to maintain SpO2 > 90%  - DuoNebs Q4H while awake. Budesonide Nebulizer BID. IS at bedside.  - May benefit from PFTs / sleep study as outpatient as discharge    Acute on chronic HFpEF  - ProBNP 4260 on admission, which is 2x previous baseline values --> improved to 1655 this am.  - Echo 82/0: normal systolic function. EF 55-60%. Grade III diastolic dysfunction. Severe bi-atrial enlargement. Mild mitral and tricuspid regurg. Moderate pulm htn. - Continue Lopressor --> will hold Lisinopril and Zaroxoyln due to TYRON  - Received Lasix 40 mg IV daily x 2 --> discontinued now due to TYRON  - Fluid restrict. Low salt diet. Strict I&O. Daily weights. TYRON CKD Stage IIIa  - Creatinine increased again today, likely due to increased diuresis  - Holding Lisinopril, Allopurinol, Zaroxolyn due to TYRON   - BMP in am    Acute uncomplicated cystitis  - UA with trace leukocyte esterase, positive nitrites, mod.  Bacteria, 5-9 WBC  - Repeat UA negative nitrites / leukocyte esterase  - Previously on Rocephin. Transitioned to Omnicef BID    Elevated troponin / CAD  - Likely due to demand ischemia and CHF exacerbation  - Denies chest pain  - Continue aspirin   - Maintain telemetry. Monitor for chest pain     Chronic atrial fibrillation  - Not on anticoagulation due to IVH after mechanical fall in 2020  - [id number] = 7; however HAS-BLED at least 4  - Continue Lopressor 100 mg BID  - Maintain telemetry      Leukocytosis  - Likely secondary to steroid use  - No fevers. - CBC in am    Primary hypertension  - Blood pressures improved  - Continue Lopressor and Norvasc with hold parameters - hold Lisinopril due to TYRON  - May need Hydralazine if TYRON persists     History of CVA with residual right-sided weakness  - Uses walker at home to ambulate / wears leg brace   - PT/OT    History of Intraventricular hemorrhage after mechanical fall (2020)  - Noted    Hypothyroidism  - Continue Synthroid     GERD  - Continue home Pepcid    Gout  - Holding home Allopurinol due to TYRON    Chief Complaint: shortness of breath    Hospital Course:   Per H&P, Cali Cardona is a 78 y.o. female w/siginificant PMHx of HTN, CAD, A fib not on [facility], HLD, CKD, hx of CVA w/residual b/l LE weakness, Intracranial hemorrhage 2/2 to mechanical fall in 2020, Hypothyroidism, COPD, and chronic hypoxic respiratory failure who presented to the [facility] ED via EMS from the [address] [place] [address] for progressively worsening SOB, nasal congestion, non-productive cough, and mucopurulent sputum over the course of approximately 1 week. Pt states that she began having a greater difficulty breathing last night that acutely worsened her SOB to where she felt air hungry and anxious. Pt normally on [facility] and [facility] and was noted to have an O2 sat of 84% at the NH while this incident was occurring.  Pt denies CP, abdominal pain, fever/chills/rigors, N/V.      ED course: Presented via EMS, O2 sats in 80's on RA 4LNC O2 sat 84%, placed on NRB at 10L then weaned back down to 4LNC, /85, RR 24. Noted to have Trops x1 @ 0.018, pro-BNP 4260, ABG demonstrates pH 7.38, pCO2 52, pO2 74, HCO3 30 Respiratory Acidosis w/full metabolic compensation. COVID-19 and Influenza A/B negative, procal 0.09, WBC 10.9, LA wnl. Received Decadron and DuoNebs in ED. Being admitted for further evaluation and w/u.      Of note, pt becoming a little dry, Na increasing, BUN and Cr increasing, Serum Osm >300, GFR decreasing, ordered urine Na / Osm and started pt on NS @75cc/hr. Pt appears to be have a sensitive and labile fluid status and don't want to aggravate her HTN as well. Will continue to monitor. Pt weaned down to [place] w/sats >92% at this time. \"    Subjective:  Patient seen and examined. No acute events overnight and in no acute distress. She continues to be intermittently confused, but still  oriented x 4 when asked. She reports good sleep last night. When asked how she is breathing, she states, \"I think better? \" She denies fevers, chills, cough, chest pain, shortness of breath, nausea/vomiting, diarrhea, constipation, leg pain.      Medications:  Reviewed    Infusion Medications    sodium chloride       Scheduled Medications    cefdinir  300 mg Oral 2 times per day    budesonide  250 mcg Nebulization BID    heparin (porcine)  5,000 Units SubCUTAneous 3 times per day    doxepin  10 mg Oral Nightly    levothyroxine  175 mcg Oral Daily    sodium chloride flush  5-40 mL IntraVENous 2 times per day    [Held by provider] allopurinol  100 mg Oral Daily    amLODIPine  5 mg Oral Daily    aspirin  81 mg Oral Daily    famotidine  20 mg Oral Nightly    folic acid  1 mg Oral Daily    [Held by provider] lisinopril  10 mg Oral Daily    [Held by provider] metOLazone  5 mg Oral Daily    metoprolol  100 mg Oral BID    potassium chloride  20 mEq Oral Daily    ipratropium-albuterol  1 ampule Inhalation Q4H WA     PRN Meds: magnesium sulfate, sodium chloride flush, melatonin, sodium chloride, ondansetron **OR** ondansetron, polyethylene glycol, acetaminophen **OR** acetaminophen      Intake/Output Summary (Last 24 hours) at 12/9/2021 1434  Last data filed at 12/9/2021 1428  Gross per 24 hour   Intake 350 ml   Output --   Net 350 ml       Diet:  ADULT DIET; Dysphagia - Soft and Bite Sized; Low Sodium (2 gm); Moderately Thick (Honey); 2000 ml    Exam:  /61   Pulse 65   Temp 98.2 °F (36.8 °C) (Oral)   Resp 20   Ht 5' 3\" (1.6 m)   Wt 224 lb 3.3 oz (101.7 kg)   SpO2 94%   BMI 39.72 kg/m²     Physical Exam  Vitals reviewed. Constitutional:       General: She is not in acute distress. Appearance: Normal appearance. She is obese. She is not ill-appearing. Comments: Chronically ill-appearing. HENT:      Head: Normocephalic and atraumatic. Right Ear: External ear normal.      Left Ear: External ear normal.      Nose: Nose normal.      Mouth/Throat:      Mouth: Mucous membranes are moist.   Eyes:      Conjunctiva/sclera: Conjunctivae normal.   Cardiovascular:      Rate and Rhythm: Normal rate and regular rhythm. Pulses: Normal pulses. Heart sounds: Normal heart sounds. Pulmonary:      Effort: Pulmonary effort is normal. No respiratory distress. Breath sounds: Normal breath sounds. No wheezing or rhonchi. Comments: Improved air entry, but still diminished throughout all lung fields. Abdominal:      General: Abdomen is flat. Bowel sounds are normal. There is no distension. Palpations: Abdomen is soft. Tenderness: There is no abdominal tenderness. Musculoskeletal:      Cervical back: Normal range of motion. Right lower leg: Edema present. Left lower leg: Edema present. Comments: Mild non-pitting LE edema   Skin:     General: Skin is warm and dry. Capillary Refill: Capillary refill takes less than 2 seconds. Findings: No rash.    Neurological:      General: No focal deficit present. Mental Status: She is alert and oriented to person, place, and time. Comments: Confused, but still oriented x 4 with slow speech   Psychiatric:         Mood and Affect: Mood normal.         Behavior: Behavior normal.         Labs:   Recent Labs     12/07/21  0816 12/08/21  0349 12/09/21  0433   WBC 11.5* 10.5 14.1*   HGB 13.1 12.7 13.4   HCT 40.6 40.9 42.2    153 183     Recent Labs     12/07/21  0816 12/08/21  0550 12/09/21  0433    143 139   K 4.2 4.7 3.9   CL 99 101 98   CO2 30 28 25   BUN 44* 58* 73*   CREATININE 1.1 1.5* 1.8*   CALCIUM 9.3 9.2 9.3     No results for input(s): AST, ALT, BILIDIR, BILITOT, ALKPHOS in the last 72 hours. No results for input(s): INR in the last 72 hours. No results for input(s): Adarsh Cobian in the last 72 hours. Urinalysis:      Lab Results   Component Value Date    NITRU NEGATIVE 12/09/2021    WBCUA 5-9 12/04/2021    BACTERIA MODERATE 12/04/2021    RBCUA 3-5 12/04/2021    BLOODU NEGATIVE 12/09/2021    SPECGRAV 1.022 12/04/2021    GLUCOSEU NEGATIVE 12/09/2021       Radiology:  Fluoroscopy modified barium swallow with video   Final Result   1. Laryngeal penetration of honey thick, nectar thick, thin and soft barium with aspiration of thin barium. 2. Additional recommendations from the speech therapist will follow. **This report has been created using voice recognition software. It may contain minor errors which are inherent in voice recognition technology. **         Final report electronically signed by Dr. Walter Cullen on 12/6/2021 10:39 AM      XR CHEST PORTABLE   Final Result   Cardiomegaly. No other acute cardiopulmonary disease retrocardiac infiltrates cannot be excluded. **This report has been created using voice recognition software. It may contain minor errors which are inherent in voice recognition technology. **      Final report electronically signed by Dr. Jewell Ribeiro on 12/3/2021 6:10 PM Diet: ADULT DIET; Dysphagia - Soft and Bite Sized; Low Sodium (2 gm);  Moderately Thick (Honey); 2000 ml    DVT prophylaxis: [x] Lovenox                                 [] SCDs                                 [] SQ Heparin                                 [] Encourage ambulation           [] Already on Anticoagulation     Disposition:    [] Home       [] TCU       [] Rehab       [] Psych       [x] SNF       [] [place]       [] Other-    Code Status: Limited    PT/OT Eval Status: on board      Electronically signed by Alexander Cho MD on 12/9/2021 at 2:34 PM

## 2021-12-09 NOTE — RT PROTOCOL NOTE
RT Inhaler-Nebulizer Bronchodilator Protocol Note    There is a bronchodilator order in the chart from a provider indicating to follow the RT Bronchodilator Protocol and there is an Initiate RT Inhaler-Nebulizer Bronchodilator Protocol order as well (see protocol at bottom of note). CXR Findings:  No results found. The findings from the last RT Protocol Assessment were as follows:   History Pulmonary Disease: Chronic pulmonary disease  Respiratory Pattern: Dyspnea on exertion or RR 21-25 bpm  Breath Sounds: Intermittent or unilateral wheezes  Cough: Weak, non-productive  Indication for Bronchodilator Therapy: Decreased or absent breath sounds  Bronchodilator Assessment Score: 11    Aerosolized bronchodilator medication orders have been revised according to the RT Inhaler-Nebulizer Bronchodilator Protocol below. Respiratory Therapist to perform RT Therapy Protocol Assessment initially then follow the protocol. Repeat RT Therapy Protocol Assessment PRN for score 0-3 or on second treatment, BID, and PRN for scores above 3. No Indications - adjust the frequency to every 6 hours PRN wheezing or bronchospasm, if no treatments needed after 48 hours then discontinue using Per Protocol order mode. If indication present, adjust the RT bronchodilator orders based on the Bronchodilator Assessment Score as indicated below. Use Inhaler orders unless patient has one or more of the following: on home nebulizer, not able to hold breath for 10 seconds, is not alert and oriented, cannot activate and use MDI correctly, or respiratory rate 25 breaths per minute or more, then use the equivalent nebulizer order(s) with same Frequency and PRN reasons based on the score. If a patient is on this medication at home then do not decrease Frequency below that used at home.     0-3 - enter or revise RT bronchodilator order(s) to equivalent RT Bronchodilator order with Frequency of every 4 hours PRN for wheezing or increased work of breathing using Per Protocol order mode. 4-6 - enter or revise RT Bronchodilator order(s) to two equivalent RT bronchodilator orders with one order with BID Frequency and one order with Frequency of every 4 hours PRN wheezing or increased work of breathing using Per Protocol order mode. 7-10 - enter or revise RT Bronchodilator order(s) to two equivalent RT bronchodilator orders with one order with TID Frequency and one order with Frequency of every 4 hours PRN wheezing or increased work of breathing using Per Protocol order mode. 11-13 - enter or revise RT Bronchodilator order(s) to one equivalent RT bronchodilator order with QID Frequency and an Albuterol order with Frequency of every 4 hours PRN wheezing or increased work of breathing using Per Protocol order mode. Greater than 13 - enter or revise RT Bronchodilator order(s) to one equivalent RT bronchodilator order with every 4 hours Frequency and an Albuterol order with Frequency of every 2 hours PRN wheezing or increased work of breathing using Per Protocol order mode. RT to enter RT Home Evaluation for COPD & MDI Assessment order using Per Protocol order mode.     Electronically signed by Adilia Monte RCP on 12/9/2021 at 8:17 AM

## 2021-12-09 NOTE — PROGRESS NOTES
[address] [address]  INPATIENT SPEECH THERAPY  [facility] CCU-STEPDOWN 3B  DAILY NOTE    TIME   SLP Individual Minutes  Time In: 7700  Time Out: 1306  Minutes: 18  Timed Code Treatment Minutes: 0 Minutes       Date: 2021  Patient Name: Martín Alston      CSN: [id number]   : 1940  (78 y.o.)  Gender: female   Referring Physician:  Brinda Kauffman MD  Diagnosis: COPD Exacerbation  Secondary Diagnosis: Dysphagia  Precautions: fall risk, aspiration precautions  Current Diet: Soft and bite sized diet with moderately thick liquids  Swallowing Strategies: Standard Universal Swallow Precautions and Full Upright Position, Small Bite/Sip, No Straw, Medications Crushed with Puree, Direct 1:1 Supervision and Alternate Solids and Liquids; avoid mixed consistencies   Date of Last MBS/FEES: MBS 21    Pain:  No pain reported. Subjective:  SALVADOR Mclean approved completion of dysphagia tx this date. Upon arrival to room, patient awake in bed and agreeable to dysphagia tx. Patient continues to demonstrate increased confusion throughout session this date. Patient also observed with thin liquids in patient tumbler - ST dumped thin liquids, update careboard, and spoke with RN following session regarding current diet level. Short-Term Goals:  SHORT TERM GOAL #1:  Goal 1: Patient will safely consume a soft and bite sized diet + moderately thick liquids (avoid mixed consistencies) with direct 1:1 supervision while implementing recommended swallow strategies without overt s/s of aspiration to maximize nutrition/hydration levels. INTERVENTIONS:  ST completed skilled education regarding how to thicken liquids. Patient demonstrating poor comprehension of how to thicken liquids as well as rationale for thickened liquids based on most recent MBS. ST completed po trials of moderately thick liquids via cup sips this date.  Patient demonstrated evidence of adequate labial seal, suspected decreased bolus control/containment, suspected delayed swallow initiation d/t presence of audible swallow, and delayed cough observed x2. *ST spoke with RN following session to discuss overt s/s of moderately thick liquids and reviewed recommended swallow strategies based on most recent MBS. ST encouraged RN to continue with close pulmonary monitoring and to inform ST should any decline be noted. SHORT TERM GOAL #2:  Goal 2: Patient will complete advanced PO trials to with adeqaute textural breakdown, cohesive bolus formation, timely AP transit and no overt s/s of aspiration to determine readiness for dietary upgrade. INTERVENTIONS: Did not address this date d/t focus on other goals. SHORT TERM GOAL #3:  Goal 3: Patient will complete pharyngeal strengthening exercises (effortful, beverly, CTAR) x10 with good success to improve strength of swallow function. INTERVENTIONS: ST provided skilled instruction regarding how to complete effortful swallow as well as rationale for completion of effortful swallow to improve airway protection and decrease pharyngeal residue. Effortful Swallow in conjunction with tsp sip x7 - fair success with exercise; patient required frequent cues to \"swallow fast\" and \"swallow hard\". Patient observed with delayed cough x2 with moderately thick liquids via tsp    Effortful Swallow - volitional swallow x3 - fair success with exercise. ST encouraged patient squeeze ST's fingers to assist with \"swallowing hard\". Delayed initiation of dry volitional swallow.      Long-Term Goals:   No LTG's secondary to short estimated length of stay       EDUCATION:  Learner: Patient  Education:  Reviewed diet and strategies, Reviewed signs, symptoms and risks of aspiration, Reviewed recommendations for follow-up and Education Related to Potential Risks and Complications Due to Impairment/Illness/Injury  Evaluation of Education: Verbalizes understanding, Needs further instruction and Family not present    ASSESSMENT/PLAN:  Activity Tolerance:  Patient tolerance of  treatment: fair. Assessment/Plan: Patient progressing toward established goals. Continues to require skilled care of licensed speech pathologist to progress toward achievement of established goals and plan of care. .     Plan for Next Session: dietary analysis, advanced PO trials, pharyngeal strengthening exercises       [facility]) [address]

## 2021-12-10 LAB
ANION GAP SERPL CALCULATED.3IONS-SCNC: 14 MEQ/L (ref 8–16)
BASOPHILS # BLD: 0.1 %
BASOPHILS ABSOLUTE: 0 THOU/MM3 (ref 0–0.1)
BUN BLDV-MCNC: 69 MG/DL (ref 7–22)
CALCIUM SERPL-MCNC: 9.4 MG/DL (ref 8.5–10.5)
CHLORIDE BLD-SCNC: 98 MEQ/L (ref 98–111)
CO2: 28 MEQ/L (ref 23–33)
CREAT SERPL-MCNC: 1.6 MG/DL (ref 0.4–1.2)
EOSINOPHIL # BLD: 0.1 %
EOSINOPHILS ABSOLUTE: 0 THOU/MM3 (ref 0–0.4)
ERYTHROCYTE [DISTWIDTH] IN BLOOD BY AUTOMATED COUNT: 14.5 % (ref 11.5–14.5)
ERYTHROCYTE [DISTWIDTH] IN BLOOD BY AUTOMATED COUNT: 52.5 FL (ref 35–45)
GFR SERPL CREATININE-BSD FRML MDRD: 31 ML/MIN/1.73M2
GLUCOSE BLD-MCNC: 134 MG/DL (ref 70–108)
HCT VFR BLD CALC: 39.4 % (ref 37–47)
HEMOGLOBIN: 12.6 GM/DL (ref 12–16)
IMMATURE GRANS (ABS): 0.16 THOU/MM3 (ref 0–0.07)
IMMATURE GRANULOCYTES: 1.2 %
LYMPHOCYTES # BLD: 6.9 %
LYMPHOCYTES ABSOLUTE: 0.9 THOU/MM3 (ref 1–4.8)
MAGNESIUM: 2.1 MG/DL (ref 1.6–2.4)
MCH RBC QN AUTO: 31.6 PG (ref 26–33)
MCHC RBC AUTO-ENTMCNC: 32 GM/DL (ref 32.2–35.5)
MCV RBC AUTO: 98.7 FL (ref 81–99)
MONOCYTES # BLD: 9.8 %
MONOCYTES ABSOLUTE: 1.3 THOU/MM3 (ref 0.4–1.3)
NUCLEATED RED BLOOD CELLS: 0 /100 WBC
PLATELET # BLD: 174 THOU/MM3 (ref 130–400)
PMV BLD AUTO: 10.9 FL (ref 9.4–12.4)
POTASSIUM SERPL-SCNC: 4.3 MEQ/L (ref 3.5–5.2)
RBC # BLD: 3.99 MILL/MM3 (ref 4.2–5.4)
SARS-COV-2, NAAT: NOT  DETECTED
SEG NEUTROPHILS: 81.9 %
SEGMENTED NEUTROPHILS ABSOLUTE COUNT: 11 THOU/MM3 (ref 1.8–7.7)
SODIUM BLD-SCNC: 140 MEQ/L (ref 135–145)
WBC # BLD: 13.4 THOU/MM3 (ref 4.8–10.8)

## 2021-12-10 PROCEDURE — 36415 COLL VENOUS BLD VENIPUNCTURE: CPT

## 2021-12-10 PROCEDURE — 6360000002 HC RX W HCPCS: Performed by: HOSPITALIST

## 2021-12-10 PROCEDURE — 6370000000 HC RX 637 (ALT 250 FOR IP): Performed by: STUDENT IN AN ORGANIZED HEALTH CARE EDUCATION/TRAINING PROGRAM

## 2021-12-10 PROCEDURE — 6360000002 HC RX W HCPCS: Performed by: STUDENT IN AN ORGANIZED HEALTH CARE EDUCATION/TRAINING PROGRAM

## 2021-12-10 PROCEDURE — 99232 SBSQ HOSP IP/OBS MODERATE 35: CPT | Performed by: FAMILY MEDICINE

## 2021-12-10 PROCEDURE — 2700000000 HC OXYGEN THERAPY PER DAY

## 2021-12-10 PROCEDURE — 80048 BASIC METABOLIC PNL TOTAL CA: CPT

## 2021-12-10 PROCEDURE — 94640 AIRWAY INHALATION TREATMENT: CPT

## 2021-12-10 PROCEDURE — 94760 N-INVAS EAR/PLS OXIMETRY 1: CPT

## 2021-12-10 PROCEDURE — 2580000003 HC RX 258: Performed by: STUDENT IN AN ORGANIZED HEALTH CARE EDUCATION/TRAINING PROGRAM

## 2021-12-10 PROCEDURE — 2140000000 HC CCU INTERMEDIATE R&B

## 2021-12-10 PROCEDURE — 83735 ASSAY OF MAGNESIUM: CPT

## 2021-12-10 PROCEDURE — 92526 ORAL FUNCTION THERAPY: CPT

## 2021-12-10 PROCEDURE — 87635 SARS-COV-2 COVID-19 AMP PRB: CPT

## 2021-12-10 PROCEDURE — 85025 COMPLETE CBC W/AUTO DIFF WBC: CPT

## 2021-12-10 RX ORDER — FUROSEMIDE 20 MG/1
20 TABLET ORAL DAILY
Status: DISCONTINUED | OUTPATIENT
Start: 2021-12-10 | End: 2021-12-11 | Stop reason: HOSPADM

## 2021-12-10 RX ORDER — LORAZEPAM 2 MG/ML
0.5 INJECTION INTRAMUSCULAR NIGHTLY PRN
Status: DISCONTINUED | OUTPATIENT
Start: 2021-12-10 | End: 2021-12-11 | Stop reason: HOSPADM

## 2021-12-10 RX ADMIN — SODIUM CHLORIDE, PRESERVATIVE FREE 10 ML: 5 INJECTION INTRAVENOUS at 20:19

## 2021-12-10 RX ADMIN — IPRATROPIUM BROMIDE AND ALBUTEROL SULFATE 1 AMPULE: .5; 3 SOLUTION RESPIRATORY (INHALATION) at 08:20

## 2021-12-10 RX ADMIN — HEPARIN SODIUM 5000 UNITS: 5000 INJECTION INTRAVENOUS; SUBCUTANEOUS at 20:25

## 2021-12-10 RX ADMIN — HEPARIN SODIUM 5000 UNITS: 5000 INJECTION INTRAVENOUS; SUBCUTANEOUS at 14:01

## 2021-12-10 RX ADMIN — AMLODIPINE BESYLATE 5 MG: 5 TABLET ORAL at 09:13

## 2021-12-10 RX ADMIN — POTASSIUM CHLORIDE 20 MEQ: 1500 TABLET, EXTENDED RELEASE ORAL at 09:06

## 2021-12-10 RX ADMIN — IPRATROPIUM BROMIDE AND ALBUTEROL SULFATE 1 AMPULE: .5; 3 SOLUTION RESPIRATORY (INHALATION) at 11:39

## 2021-12-10 RX ADMIN — HEPARIN SODIUM 5000 UNITS: 5000 INJECTION INTRAVENOUS; SUBCUTANEOUS at 07:09

## 2021-12-10 RX ADMIN — LEVOTHYROXINE SODIUM 175 MCG: 0.15 TABLET ORAL at 09:05

## 2021-12-10 RX ADMIN — IPRATROPIUM BROMIDE AND ALBUTEROL SULFATE 1 AMPULE: .5; 3 SOLUTION RESPIRATORY (INHALATION) at 15:08

## 2021-12-10 RX ADMIN — BUDESONIDE 250 MCG: 0.25 INHALANT RESPIRATORY (INHALATION) at 08:20

## 2021-12-10 RX ADMIN — CEFDINIR 300 MG: 300 CAPSULE ORAL at 09:06

## 2021-12-10 RX ADMIN — LORAZEPAM 0.5 MG: 2 INJECTION INTRAMUSCULAR; INTRAVENOUS at 21:08

## 2021-12-10 RX ADMIN — FOLIC ACID 1 MG: 1 TABLET ORAL at 09:06

## 2021-12-10 RX ADMIN — SODIUM CHLORIDE, PRESERVATIVE FREE 10 ML: 5 INJECTION INTRAVENOUS at 09:06

## 2021-12-10 RX ADMIN — ASPIRIN 81 MG: 81 TABLET, COATED ORAL at 09:05

## 2021-12-10 RX ADMIN — METOPROLOL TARTRATE 100 MG: 100 TABLET, FILM COATED ORAL at 09:06

## 2021-12-10 RX ADMIN — FUROSEMIDE 20 MG: 20 TABLET ORAL at 12:57

## 2021-12-10 ASSESSMENT — PAIN SCALES - GENERAL
PAINLEVEL_OUTOF10: 0

## 2021-12-10 NOTE — PROGRESS NOTES
[address] [address]  INPATIENT SPEECH THERAPY  STRZ CCU-STEPDOWN 3B  DAILY NOTE    TIME   SLP Individual Minutes  Time In: 4950  Time Out: 9899  Minutes: 17  Timed Code Treatment Minutes: 0 Minutes       Date: 12/10/2021  Patient Name: Dwain Alva      CSN: [id number]   : 1940  (78 y.o.)  Gender: female   Referring Physician:  Dennis Swanson MD  Diagnosis: COPD Exacerbation  Secondary Diagnosis: Dysphagia  Precautions: fall risk, aspiration precautions  Current Diet: Soft and bite sized diet with moderately thick liquids  Swallowing Strategies: Standard Universal Swallow Precautions and Full Upright Position, Small Bite/Sip, No Straw, Medications Crushed with Puree, Direct 1:1 Supervision and Alternate Solids and Liquids; avoid mixed consistencies   Date of Last MBS/FEES: MBS 21    Pain:  No pain reported. Subjective:  SALVADOR Espinoza approved completion of dysphagia tx this date and reports good success with po intake thus far. SALVADOR Espinoza reporting diminished lung sounds bilaterally. Upon arrival to room, patient awake in bed requesting to be re-positioned. SALVADOR Lauren assisted ST with repositioning of patient. Patient pleasant and cooperative; however, continues to demonstrate confusion. Short-Term Goals:  SHORT TERM GOAL #1:  Goal 1: Patient will safely consume a soft and bite sized diet + moderately thick liquids (avoid mixed consistencies) with direct 1:1 supervision while implementing recommended swallow strategies without overt s/s of aspiration to maximize nutrition/hydration levels. INTERVENTIONS:  ST completed review of recommended swallow strategies - upright position, small bites/sips, alternation of bites/sip, and no straw.  ST the completed po trials of moderately thick liquids via cup and hard/coarse texture     Moderately thick liquids via cup sip x6 - patient demonstrated evidence of decreased labial seal, delayed oral initiation, suspected poor bolus control/containment, suspected delayed swallow initiation evidenced by presence of audible swallow, and immediate cough, wet/gurgly vocal quality 4/6 trials. 1/2 Jihan Conch Cracker - patient demonstrated evidence of decreased bolus control/manipulation, mildly extended/coordinated mastication, suspected decreased bolus formation resulting in mild oral residue, suspected delayed swallow initiation, and throat clear observed x1. At this time, ST with increased concerns for airway invasion events despite modified diet and swallow strategies. Recommend patient remain NPO until completion of repeat MBS on 12/11/2021. *post evaluation, patient without respiratory distress upon leaving room; RN Adri notified re: clinical findings and recommendations from the assessment; verbal receptiveness noted        SHORT TERM GOAL #2:  Goal 2: Patient will complete advanced PO trials to with adeqaute textural breakdown, cohesive bolus formation, timely AP transit and no overt s/s of aspiration to determine readiness for dietary upgrade. INTERVENTIONS: Did not address this date d/t focus on other goals. SHORT TERM GOAL #3:  Goal 3: Patient will complete pharyngeal strengthening exercises (effortful, beverly, CTAR) x10 with good success to improve strength of swallow function. INTERVENTIONS: Did not address this session d/t focus on other goals. Long-Term Goals:   No LTG's secondary to short estimated length of stay       EDUCATION:  Learner: Patient  Education:  Reviewed diet and strategies, Reviewed signs, symptoms and risks of aspiration, Reviewed recommendations for follow-up and Education Related to Potential Risks and Complications Due to Impairment/Illness/Injury  Evaluation of Education: Verbalizes understanding, Needs further instruction and Family not present    ASSESSMENT/PLAN:  Activity Tolerance:  Patient tolerance of  treatment: fair. Assessment/Plan: Patient progressing toward established goals.   Continues to require skilled care of licensed speech pathologist to progress toward achievement of established goals and plan of care. .     Plan for Next Session: [facility] [place]) [address]

## 2021-12-10 NOTE — PROGRESS NOTES
PROGRESS NOTE      Patient:  Arianne Mullen      Unit/Bed:3B-31/031-A    YOB: 1940    MRN: [id number]       Acct: [de-identified]     PCP: Fabian Goodman DO    Date of Admission: 12/3/2021      Assessment/Plan:    Likely discharge tomorrow if TYRON continues to improve    Active Hospital Problems    Diagnosis Date Noted    Acute on chronic respiratory failure with hypoxia ([facility] [address] [J96.21]     COPD exacerbation ([facility] [address] [J44.1] 12/03/2021    Acute on chronic diastolic congestive heart failure (HCC) [I50.33]      Acute on chronic hypoxic respiratory failure, stable  - Presented with worsening of chronic hypoxia - normal 2-4L NC, but needed 6L on admission. She is currently on 3L  - Suspect this is more due to CHF exacerbation vs. COPD vs. Infection   - Discontinued Prednisone as it will worsen fluid retention and does not appear to be COPD exacerbation. No indication to continue antibiotics at this time either.   - Received Lasix 40 mg IV daily x 2 --> discontinued now due to TYRON  - Will resume Lasix 20 mg PO and see how she tolerates it  - Continue to wean oxygen as tolerated to maintain SpO2 > 90%  - DuoNebs Q4H while awake. Budesonide Nebulizer BID. IS at bedside.  - May benefit from PFTs / sleep study as outpatient as discharge    Acute on chronic HFpEF  - ProBNP 4260 on admission, which is 2x previous baseline values --> improved to 1655 12/9   - Echo 65/7: normal systolic function. EF 55-60%. Grade III diastolic dysfunction. Severe bi-atrial enlargement. Mild mitral and tricuspid regurg. Moderate pulm htn. - Continue Lopressor --> will hold Lisinopril and Zaroxoyln due to TYRON  - Received Lasix 40 mg IV daily x 2 --> discontinued due to TYRON. - Will resume Lasix 20 mg PO and see how she tolerates it  - Fluid restrict. Low salt diet. Strict I&O. Daily weights. TYRON on CKD Stage IIIa, improving   - Creatinine down to 1.6 today (down from 1.8 at peak).    - Holding Lisinopril, Allopurinol, Zaroxolyn due to TYRON   - Will restart gentle diuresis with PO Lasix at 20 mg daily  - BMP in am    Acute uncomplicated cystitis  - UA with trace leukocyte esterase, positive nitrites, mod. Bacteria, 5-9 WBC  - Repeat UA negative nitrites / leukocyte esterase  - Previously on Rocephin. Transitioned to Omnicef BID    Elevated troponin / CAD  - Likely due to demand ischemia and CHF exacerbation  - Denies chest pain  - Continue aspirin   - Maintain telemetry. Monitor for chest pain     Chronic atrial fibrillation  - Not on anticoagulation due to IVH after mechanical fall in 2020  - [id number] = 7; however HAS-BLED at least 4  - Continue Lopressor 100 mg BID  - Maintain telemetry      Leukocytosis  - Likely secondary to steroid use  - No fevers. - CBC in am    Primary hypertension  - Blood pressures improved  - Continue Lopressor and Norvasc with hold parameters - hold Lisinopril due to YTRON  - May need Hydralazine if TYRON persists     History of CVA with residual right-sided weakness  - Uses walker at home to ambulate / wears leg brace   - PT/OT    History of Intraventricular hemorrhage after mechanical fall (2020)  - Noted    Hypothyroidism  - Continue Synthroid     GERD  - Continue home Pepcid    Gout  - Holding home Allopurinol due to TYRON    Chief Complaint: shortness of breath    Hospital Course:   Per H&P, Best Oleary is a 78 y.o. female w/siginificant PMHx of HTN, CAD, A fib not on [facility], HLD, CKD, hx of CVA w/residual b/l LE weakness, Intracranial hemorrhage 2/2 to mechanical fall in 2020, Hypothyroidism, COPD, and chronic hypoxic respiratory failure who presented to the [facility] ED via EMS from the [address] [place] [address] for progressively worsening SOB, nasal congestion, non-productive cough, and mucopurulent sputum over the course of approximately 1 week. Pt states that she began having a greater difficulty breathing last night that acutely worsened her SOB to where she felt air hungry and anxious.  Pt normally on [facility] and was increased to [employer] for the SOB and was noted to have an O2 sat of 84% at the NH while this incident was occurring. Pt denies CP, abdominal pain, fever/chills/rigors, N/V.      ED course: Presented via EMS, O2 sats in 80's on RA 4LNC O2 sat 84%, placed on NRB at 10L then weaned back down to 4LNC, /85, RR 24. Noted to have Trops x1 @ 0.018, pro-BNP 4260, ABG demonstrates pH 7.38, pCO2 52, pO2 74, HCO3 30 Respiratory Acidosis w/full metabolic compensation. COVID-19 and Influenza A/B negative, procal 0.09, WBC 10.9, LA wnl. Received Decadron and DuoNebs in ED. Being admitted for further evaluation and w/u.      Of note, pt becoming a little dry, Na increasing, BUN and Cr increasing, Serum Osm >300, GFR decreasing, ordered urine Na / Osm and started pt on NS @75cc/hr. Pt appears to be have a sensitive and labile fluid status and don't want to aggravate her HTN as well. Will continue to monitor. Pt weaned down to [place] [employer] w/sats >92% at this time. \"    Subjective:  Patient seen and examined. No acute events overnight and in no acute distress. Patient reports feeling better today and slept well overnight. She denies fevers, chills, cough, chest pain, shortness of breath, nausea/vomiting, diarrhea, constipation, leg pain.      Medications:  Reviewed    Infusion Medications    sodium chloride       Scheduled Medications    furosemide  20 mg Oral Daily    cefdinir  300 mg Oral 2 times per day    budesonide  250 mcg Nebulization BID    heparin (porcine)  5,000 Units SubCUTAneous 3 times per day    doxepin  10 mg Oral Nightly    levothyroxine  175 mcg Oral Daily    sodium chloride flush  5-40 mL IntraVENous 2 times per day    [Held by provider] allopurinol  100 mg Oral Daily    amLODIPine  5 mg Oral Daily    aspirin  81 mg Oral Daily    famotidine  20 mg Oral Nightly    folic acid  1 mg Oral Daily    [Held by provider] lisinopril  10 mg Oral Daily    [Held by provider] metOLazone  5 mg Oral Daily    metoprolol  100 mg Oral BID    potassium chloride  20 mEq Oral Daily    ipratropium-albuterol  1 ampule Inhalation Q4H WA     PRN Meds: magnesium sulfate, sodium chloride flush, melatonin, sodium chloride, ondansetron **OR** ondansetron, polyethylene glycol, acetaminophen **OR** acetaminophen      Intake/Output Summary (Last 24 hours) at 12/10/2021 1253  Last data filed at 12/9/2021 2009  Gross per 24 hour   Intake 500 ml   Output 700 ml   Net -200 ml       Diet:  ADULT DIET; Dysphagia - Soft and Bite Sized; Low Sodium (2 gm); Moderately Thick (Honey); 2000 ml    Exam:  BP (!) 113/59   Pulse 88   Temp 98.2 °F (36.8 °C) (Oral)   Resp 16   Ht 5' 3\" (1.6 m)   Wt 224 lb 3.3 oz (101.7 kg)   SpO2 93%   BMI 39.72 kg/m²     Physical Exam  Vitals reviewed. Constitutional:       General: She is not in acute distress. Appearance: Normal appearance. She is obese. She is not ill-appearing. Comments: Chronically ill-appearing. HENT:      Head: Normocephalic and atraumatic. Right Ear: External ear normal.      Left Ear: External ear normal.      Nose: Nose normal.      Mouth/Throat:      Mouth: Mucous membranes are moist.   Eyes:      Conjunctiva/sclera: Conjunctivae normal.   Cardiovascular:      Rate and Rhythm: Normal rate and regular rhythm. Pulses: Normal pulses. Heart sounds: Normal heart sounds. Pulmonary:      Effort: Pulmonary effort is normal. No respiratory distress. Breath sounds: Normal breath sounds. No wheezing or rhonchi. Comments: Improvement in air entry, less diminished today  Abdominal:      General: Abdomen is flat. Bowel sounds are normal. There is no distension. Palpations: Abdomen is soft. Tenderness: There is no abdominal tenderness. Musculoskeletal:      Cervical back: Normal range of motion. Right lower leg: Edema present. Left lower leg: Edema present.       Comments: 1+ pitting edema bilaterally   Skin:     General: Skin is warm and dry. Capillary Refill: Capillary refill takes less than 2 seconds. Findings: No rash. Neurological:      General: No focal deficit present. Mental Status: She is alert and oriented to person, place, and time. Comments: Confused, but still oriented x 4 with slow speech   Psychiatric:         Mood and Affect: Mood normal.         Behavior: Behavior normal.         Labs:   Recent Labs     12/08/21  0349 12/09/21  0433 12/10/21  0353   WBC 10.5 14.1* 13.4*   HGB 12.7 13.4 12.6   HCT 40.9 42.2 39.4    183 174     Recent Labs     12/08/21  0550 12/09/21  0433 12/10/21  0353    139 140   K 4.7 3.9 4.3    98 98   CO2 28 25 28   BUN 58* 73* 69*   CREATININE 1.5* 1.8* 1.6*   CALCIUM 9.2 9.3 9.4     No results for input(s): AST, ALT, BILIDIR, BILITOT, ALKPHOS in the last 72 hours. No results for input(s): INR in the last 72 hours. No results for input(s): [employer] in the last 72 hours. Urinalysis:      Lab Results   Component Value Date    NITRU NEGATIVE 12/09/2021    WBCUA 5-9 12/04/2021    BACTERIA MODERATE 12/04/2021    RBCUA 3-5 12/04/2021    BLOODU NEGATIVE 12/09/2021    SPECGRAV 1.022 12/04/2021    GLUCOSEU NEGATIVE 12/09/2021       Radiology:  Fluoroscopy modified barium swallow with video   Final Result   1. Laryngeal penetration of honey thick, nectar thick, thin and soft barium with aspiration of thin barium. 2. Additional recommendations from the speech therapist will follow. **This report has been created using voice recognition software. It may contain minor errors which are inherent in voice recognition technology. **         Final report electronically signed by Dr. Mallika Flores on 12/6/2021 10:39 AM      XR CHEST PORTABLE   Final Result   Cardiomegaly. No other acute cardiopulmonary disease retrocardiac infiltrates cannot be excluded. **This report has been created using voice recognition software.   It may contain minor errors which are inherent in voice recognition technology. **      Final report electronically signed by Dr. Evette Luis on 12/3/2021 6:10 PM          Diet: ADULT DIET; Dysphagia - Soft and Bite Sized; Low Sodium (2 gm);  Moderately Thick (Honey); 2000 ml    DVT prophylaxis: [x] Lovenox                                 [] SCDs                                 [] SQ Heparin                                 [] Encourage ambulation           [] Already on Anticoagulation     Disposition:    [] Home       [] TCU       [] Rehab       [] Psych       [x] SNF       [] [place]       [] Other-    Code Status: Limited    PT/OT Eval Status: on board      Electronically signed by Troy Moore MD on 12/10/2021 at 12:53 PM

## 2021-12-10 NOTE — CARE COORDINATION
12/10/21, 9:29 AM EST    DISCHARGE PLANNING EVALUATION    Adam Aguilera is a potential discharge for today. Requesting a 3:00pm transport to [facility]. Tried to reach spouse and daughter with out success. Called son Alyson Both and made him aware of potential discharge. He will make the patients spouse aware. Update 11:05pm: Adam Aguilera will not be discharged today. SW cancelled transport and called Ron at [facility] to make her aware the discharge was cancelled. Smiley Howard is aware she is a potential discharge for tomorrow.

## 2021-12-11 ENCOUNTER — APPOINTMENT (OUTPATIENT)
Dept: GENERAL RADIOLOGY | Age: 81
DRG: 291 | End: 2021-12-11
Payer: MEDICARE

## 2021-12-11 VITALS
DIASTOLIC BLOOD PRESSURE: 76 MMHG | HEIGHT: 63 IN | BODY MASS INDEX: 38.05 KG/M2 | HEART RATE: 72 BPM | WEIGHT: 214.73 LBS | RESPIRATION RATE: 20 BRPM | SYSTOLIC BLOOD PRESSURE: 121 MMHG | TEMPERATURE: 99.3 F | OXYGEN SATURATION: 93 %

## 2021-12-11 LAB
ANION GAP SERPL CALCULATED.3IONS-SCNC: 16 MEQ/L (ref 8–16)
BACTERIA: NORMAL
BASOPHILS # BLD: 0.2 %
BASOPHILS ABSOLUTE: 0 THOU/MM3 (ref 0–0.1)
BILIRUBIN URINE: NEGATIVE
BLOOD, URINE: NEGATIVE
BUN BLDV-MCNC: 66 MG/DL (ref 7–22)
CALCIUM SERPL-MCNC: 9.8 MG/DL (ref 8.5–10.5)
CASTS: NORMAL /LPF
CASTS: NORMAL /LPF
CHARACTER, URINE: CLEAR
CHLORIDE BLD-SCNC: 99 MEQ/L (ref 98–111)
CO2: 26 MEQ/L (ref 23–33)
COLOR: YELLOW
CREAT SERPL-MCNC: 1.4 MG/DL (ref 0.4–1.2)
CRYSTALS: NORMAL
EOSINOPHIL # BLD: 0.3 %
EOSINOPHILS ABSOLUTE: 0 THOU/MM3 (ref 0–0.4)
EPITHELIAL CELLS, UA: NORMAL /HPF
ERYTHROCYTE [DISTWIDTH] IN BLOOD BY AUTOMATED COUNT: 14.3 % (ref 11.5–14.5)
ERYTHROCYTE [DISTWIDTH] IN BLOOD BY AUTOMATED COUNT: 52.7 FL (ref 35–45)
GFR SERPL CREATININE-BSD FRML MDRD: 36 ML/MIN/1.73M2
GLUCOSE BLD-MCNC: 133 MG/DL (ref 70–108)
GLUCOSE, URINE: NEGATIVE MG/DL
HCT VFR BLD CALC: 42.9 % (ref 37–47)
HEMOGLOBIN: 13.5 GM/DL (ref 12–16)
IMMATURE GRANS (ABS): 0.15 THOU/MM3 (ref 0–0.07)
IMMATURE GRANULOCYTES: 1 %
KETONES, URINE: NEGATIVE
LEUKOCYTE ESTERASE, URINE: NEGATIVE
LYMPHOCYTES # BLD: 8.6 %
LYMPHOCYTES ABSOLUTE: 1.3 THOU/MM3 (ref 1–4.8)
MAGNESIUM: 2.3 MG/DL (ref 1.6–2.4)
MCH RBC QN AUTO: 31.5 PG (ref 26–33)
MCHC RBC AUTO-ENTMCNC: 31.5 GM/DL (ref 32.2–35.5)
MCV RBC AUTO: 100.2 FL (ref 81–99)
MISCELLANEOUS LAB TEST RESULT: NORMAL
MONOCYTES # BLD: 9.2 %
MONOCYTES ABSOLUTE: 1.4 THOU/MM3 (ref 0.4–1.3)
NITRITE, URINE: NEGATIVE
NUCLEATED RED BLOOD CELLS: 0 /100 WBC
PH UA: 5 (ref 5–9)
PLATELET # BLD: 175 THOU/MM3 (ref 130–400)
PMV BLD AUTO: 11 FL (ref 9.4–12.4)
POTASSIUM SERPL-SCNC: 4 MEQ/L (ref 3.5–5.2)
PROCALCITONIN: 0.28 NG/ML (ref 0.01–0.09)
PROTEIN UA: NEGATIVE MG/DL
RBC # BLD: 4.28 MILL/MM3 (ref 4.2–5.4)
RBC URINE: NORMAL /HPF
RENAL EPITHELIAL, UA: NORMAL
SARS-COV-2, NAAT: NOT  DETECTED
SEG NEUTROPHILS: 80.7 %
SEGMENTED NEUTROPHILS ABSOLUTE COUNT: 12.4 THOU/MM3 (ref 1.8–7.7)
SODIUM BLD-SCNC: 141 MEQ/L (ref 135–145)
SPECIFIC GRAVITY UA: 1.02 (ref 1–1.03)
UROBILINOGEN, URINE: 0.2 EU/DL (ref 0–1)
WBC # BLD: 15.4 THOU/MM3 (ref 4.8–10.8)
WBC UA: NORMAL /HPF
YEAST: NORMAL

## 2021-12-11 PROCEDURE — 36415 COLL VENOUS BLD VENIPUNCTURE: CPT

## 2021-12-11 PROCEDURE — 83735 ASSAY OF MAGNESIUM: CPT

## 2021-12-11 PROCEDURE — 81001 URINALYSIS AUTO W/SCOPE: CPT

## 2021-12-11 PROCEDURE — 80048 BASIC METABOLIC PNL TOTAL CA: CPT

## 2021-12-11 PROCEDURE — 2700000000 HC OXYGEN THERAPY PER DAY

## 2021-12-11 PROCEDURE — 6360000002 HC RX W HCPCS: Performed by: STUDENT IN AN ORGANIZED HEALTH CARE EDUCATION/TRAINING PROGRAM

## 2021-12-11 PROCEDURE — 99239 HOSP IP/OBS DSCHRG MGMT >30: CPT | Performed by: FAMILY MEDICINE

## 2021-12-11 PROCEDURE — 92526 ORAL FUNCTION THERAPY: CPT

## 2021-12-11 PROCEDURE — 71045 X-RAY EXAM CHEST 1 VIEW: CPT

## 2021-12-11 PROCEDURE — 85025 COMPLETE CBC W/AUTO DIFF WBC: CPT

## 2021-12-11 PROCEDURE — 87635 SARS-COV-2 COVID-19 AMP PRB: CPT

## 2021-12-11 PROCEDURE — 2500000003 HC RX 250 WO HCPCS: Performed by: FAMILY MEDICINE

## 2021-12-11 PROCEDURE — 6370000000 HC RX 637 (ALT 250 FOR IP): Performed by: STUDENT IN AN ORGANIZED HEALTH CARE EDUCATION/TRAINING PROGRAM

## 2021-12-11 PROCEDURE — 94640 AIRWAY INHALATION TREATMENT: CPT

## 2021-12-11 PROCEDURE — 92611 MOTION FLUOROSCOPY/SWALLOW: CPT

## 2021-12-11 PROCEDURE — 84145 PROCALCITONIN (PCT): CPT

## 2021-12-11 PROCEDURE — 94669 MECHANICAL CHEST WALL OSCILL: CPT

## 2021-12-11 PROCEDURE — 74230 X-RAY XM SWLNG FUNCJ C+: CPT

## 2021-12-11 RX ORDER — FUROSEMIDE 20 MG/1
20 TABLET ORAL DAILY
Qty: 60 TABLET | Refills: 3 | DISCHARGE
Start: 2021-12-12

## 2021-12-11 RX ORDER — DOXEPIN HYDROCHLORIDE 10 MG/1
10 CAPSULE ORAL NIGHTLY
Qty: 30 CAPSULE | Refills: 3 | DISCHARGE
Start: 2021-12-11

## 2021-12-11 RX ORDER — LACTOBACILLUS RHAMNOSUS GG 10B CELL
1 CAPSULE ORAL 2 TIMES DAILY WITH MEALS
Status: DISCONTINUED | OUTPATIENT
Start: 2021-12-11 | End: 2021-12-11 | Stop reason: HOSPADM

## 2021-12-11 RX ORDER — PREDNISONE 20 MG/1
20 TABLET ORAL DAILY
Qty: 5 TABLET | Refills: 0 | DISCHARGE
Start: 2021-12-11 | End: 2021-12-16

## 2021-12-11 RX ORDER — ALLOPURINOL 100 MG/1
100 TABLET ORAL DAILY
Qty: 30 TABLET | Refills: 3 | DISCHARGE
Start: 2021-12-11

## 2021-12-11 RX ORDER — LISINOPRIL 10 MG/1
10 TABLET ORAL DAILY
Qty: 30 TABLET | Refills: 3 | DISCHARGE
Start: 2021-12-11 | End: 2021-12-11 | Stop reason: HOSPADM

## 2021-12-11 RX ORDER — AMOXICILLIN AND CLAVULANATE POTASSIUM 875; 125 MG/1; MG/1
1 TABLET, FILM COATED ORAL EVERY 12 HOURS SCHEDULED
Qty: 14 TABLET | Refills: 0 | DISCHARGE
Start: 2021-12-11 | End: 2021-12-18

## 2021-12-11 RX ORDER — METOLAZONE 5 MG/1
5 TABLET ORAL DAILY
DISCHARGE
Start: 2021-12-11

## 2021-12-11 RX ORDER — LACTOBACILLUS RHAMNOSUS GG 10B CELL
1 CAPSULE ORAL 2 TIMES DAILY WITH MEALS
Qty: 28 CAPSULE | Refills: 0 | DISCHARGE
Start: 2021-12-11 | End: 2021-12-25

## 2021-12-11 RX ORDER — PREDNISONE 20 MG/1
20 TABLET ORAL DAILY
Status: DISCONTINUED | OUTPATIENT
Start: 2021-12-11 | End: 2021-12-11 | Stop reason: HOSPADM

## 2021-12-11 RX ORDER — AMOXICILLIN AND CLAVULANATE POTASSIUM 875; 125 MG/1; MG/1
1 TABLET, FILM COATED ORAL EVERY 12 HOURS SCHEDULED
Status: DISCONTINUED | OUTPATIENT
Start: 2021-12-11 | End: 2021-12-11 | Stop reason: HOSPADM

## 2021-12-11 RX ADMIN — ASPIRIN 81 MG: 81 TABLET, COATED ORAL at 13:56

## 2021-12-11 RX ADMIN — CEFDINIR 300 MG: 300 CAPSULE ORAL at 13:56

## 2021-12-11 RX ADMIN — ACETAMINOPHEN 650 MG: 325 TABLET ORAL at 13:56

## 2021-12-11 RX ADMIN — LEVOTHYROXINE SODIUM 175 MCG: 0.15 TABLET ORAL at 13:57

## 2021-12-11 RX ADMIN — FOLIC ACID 1 MG: 1 TABLET ORAL at 13:56

## 2021-12-11 RX ADMIN — IPRATROPIUM BROMIDE AND ALBUTEROL SULFATE 1 AMPULE: .5; 3 SOLUTION RESPIRATORY (INHALATION) at 16:11

## 2021-12-11 RX ADMIN — BARIUM SULFATE 100 ML: 0.81 POWDER, FOR SUSPENSION ORAL at 12:25

## 2021-12-11 RX ADMIN — HEPARIN SODIUM 5000 UNITS: 5000 INJECTION INTRAVENOUS; SUBCUTANEOUS at 13:57

## 2021-12-11 RX ADMIN — BARIUM SULFATE 40 ML: 400 PASTE ORAL at 12:24

## 2021-12-11 RX ADMIN — METOPROLOL TARTRATE 100 MG: 100 TABLET, FILM COATED ORAL at 13:56

## 2021-12-11 RX ADMIN — POTASSIUM CHLORIDE 20 MEQ: 1500 TABLET, EXTENDED RELEASE ORAL at 13:56

## 2021-12-11 RX ADMIN — BUDESONIDE 250 MCG: 0.25 INHALANT RESPIRATORY (INHALATION) at 08:40

## 2021-12-11 RX ADMIN — IPRATROPIUM BROMIDE AND ALBUTEROL SULFATE 1 AMPULE: .5; 3 SOLUTION RESPIRATORY (INHALATION) at 08:40

## 2021-12-11 RX ADMIN — HEPARIN SODIUM 5000 UNITS: 5000 INJECTION INTRAVENOUS; SUBCUTANEOUS at 08:36

## 2021-12-11 RX ADMIN — FUROSEMIDE 20 MG: 20 TABLET ORAL at 13:56

## 2021-12-11 ASSESSMENT — PAIN SCALES - GENERAL
PAINLEVEL_OUTOF10: 0
PAINLEVEL_OUTOF10: 4

## 2021-12-11 NOTE — PROGRESS NOTES
[address] [place] [id number]  SPEECH THERAPY  STRZ CCU-STEPDOWN 3B  Modified Barium Swallow + Dysphagia tx    SLP Individual Minutes  Time In: 5774  Time Out: 1020  Minutes: 25  Timed Code Treatment Minutes: 0 Minutes     MBS: 17 minutes   Dysphagia tx: 8 minutes     Date: 2021  Patient Name: Dwain Alva      CSN: [id number]   : 1940  (78 y.o.)  Gender: female   Referring Physician:  Isra Louise MD  Diagnosis: COPD Exacerbation  Secondary Diagnosis: Dysphagia  Precautions: fall risk aspiration precautions  History of Present Illness/Injury: Patient admitted to [facility] with above medical dx. Per chart review, \"Katharine Costa is a 78 y. o. female w/siginificant PMHx of HTN, CAD, A fib not on [address] [place] [address] Road, HLD, CKD, hx of CVA w/residual b/l LE weakness, Intracranial hemorrhage 2/2 to mechanical fall in , Hypothyroidism, COPD, and chronic hypoxic respiratory failure who presented to the [facility] ED via EMS from the [address] [place] [address] for progressively worsening SOB, nasal congestion, non-productive cough, and mucopurulent sputum over the course of approximately 1 week. Pt states that she began having a greater difficulty breathing last night that acutely worsened her SOB to where she felt air hungry and anxious. Pt normally on [facility] and was increased to [facility] for the SOB and was noted to have an O2 sat of 84% at the NH while this incident was occurring. Pt denies CP, abdominal pain, fever/chills/rigors, N/V.\" ST consulted to complete MBS to r/o pharyngeal dysfunction and determine safe dietary level d/t overt s/s of aspiration observed within clinical swallow evaluation.      has a past medical history of Arthritis, CAD (coronary artery disease), Cerebral artery occlusion with cerebral infarction ([address] Chronic kidney disease, COPD exacerbation ([facility] [address] GERD (gastroesophageal reflux disease), History of blood transfusion, Hyperlipidemia, Hypertension, Movement disorder, Pneumonia, and Thyroid disease. Current Diet: NPO pending completion of MBS     Pain: No pain reported. SUBJECTIVE:  SALVADOR Beverly approved completion of MBS this date. Patient arrived to fluoro suite via bed, awake, alert, and improved mentation. Patient agreeable to PO trials with fair success following basic 1-step directions. OBJECTIVE:    Respiratory Status:  Nasal Canula (2L)    Behavioral Observation:  Alert and Limited Direction Following    PATIENT WAS EVALUATED USING:  Thin liquids via tsp, Mildly thick via tsp/cup, moderately thick via tsp/cup, puree, soft texture, mixed consistencies, and hard/coarse texture     ORAL PREPARATION PHASE:  Impaired:  Slow Mastication, Decreased Bolus Control and Decreased Bolus Formation    ORAL PHASE: Slow AP Movement, Uncontrolled Bolus/Diffuse Fall Over Tongue Base and Oral Residue on Base of Tongue and Lateral Sulci     ORAL PHASE RENEE SCORE: (Dysphagia outcome and severity scale)  4 = Mild-Moderate Dysphagia - May have one or two diet consistencies restricted - Oral residue clears with cue - Intermittent supervision or cueing    PHARYNGEAL PHASE:  Impaired: Delayed Swallow, Decreased Airway Protection, Decreased Tongue Based Retraction, Residue in the Valleculae, Residue Along the Ariepiglottic Folds and Decreased thyrohyoid approximation     PHARYNGEAL PHASE RENEE SCORE: (Dysphagia outcome and severity scale)  3 = Moderate Dysphagia - Two or more diet consistencies restricted - May exhibit one or more of the following:  Moderate residue clears with cue, Airway penetration to the level of the vocal folds wihtout cough with tow or more consistencies, Aspiration with two consistencies with weak or no reflexive cough, Aspiration of one consistency, no cough and airway penetration with one consistency, no cough    EVIDENCE FOR LARYNGEAL PENETRATION AND/OR ASPIRATION:  Laryngeal penetration evident with thin liquids, mildly thick liquids, moderately thick liquids, juice from peach  Silent aspiration evident with thin liquids, mildly thick liquids, juice from peach    PENETRATION-ASPIRATION SCALE (PAS): Thin Liquids: 8 = Material enters the airway, passes below the vocal folds, and no effort is made to eject  Mildly Thick Liquids:  8 = Material enters the airway, passes below the vocal folds, and no effort is made to eject  Moderately Thick Liquids: 2 = Material enters the airway, remains above vocal folds, and is ejected from the airway  Puree:  1 = Material does not enter the airway  Soft Solid:  1 = Material does not enter the airway  Hard Solid: 1 = Material does not enter the airway    ESOPHAGEAL PHASE:   No significant findings    ATTEMPTED TECHNIQUES:  Small Bolus Size Effective and Ineffective *effective with moderately thick liquids   Straw Not Attempted    Cup Effective and Ineffective *effective with moderately thick liquids   Chin Tuck Ineffective    Head Turn Not Attempted    Spoon Presentations Effective and Ineffective *effective with moderately thick liquids    Volitional Cough Ineffective    Spontaneous Cough Ineffective           DIAGNOSTIC IMPRESSIONS:  Patient presents with evidence of a mild-moderate oral dysphagia and a moderate pharyngeal dysphagia characterized by deficits outline above. Patient with slow mastication with eventual effective textural breakdown, decreased bolus formation, and slow ap transit resulting in mild oral residue remaining along base of tongue and within bilateral sulci that clear with additional swallow. Patient with decreased bolus control/containment resulting in premature spillage to the level of the vallecula, lateral channels of pyriforms, and to the level of the pyriforms which is further compounded by delayed swallow initiation resulting in decreased airway protection.  Patient with decreased thyrohyoid approximation resulting in decreased airway protection and DEEP penetration to the VF occurring during the swallow leading to silent aspiration of thin liquids, mildly thick liquids, and juice from peach. Volitional cough is effective in partially clearing aspirated material; however, trace aspirated material observed within trachea following volitional cough. Bolus hold attempted with thin liquids and mildly thick liquids. Patient demonstrating improved bolus control/containment with mildly thick liquids via tsp; however, gross silent aspiration of mildly thick liquids via cup sip in conjunction with bolus hold. Chin tuck also attempted and ineffective d/t poor carryover of strategy by patient d/t limited direction following. Patient with shallow transient penetration of moderately thick liquids via tsp and SMALL cup sips. Patient with decreased base of tongue retraction resulting in mild reside remaining along base of tongue and within the vallecula - good clearance with double swallow. At this time, it is recommended patient resume a soft and bite-size diet with moderately thick liquids given DIRECT 1:1 ASSISTANCE to carryover SMALL, SINGLE CUP SIPS to prevent airway invasion events. Dysphagia tx: Following completion of MBS, ST completed review of swallow anatomy and physiology via use of computer monitor. ST defined aspiration and penetration and explained risk factors associated with aspiration (pneumonia, pulmonary decline, respiratory failure, extended/frequent hospitalizations, etc). ST then reviewed results from [facility] [address]. ST discussed diet recommendations as well as recommended swallow strategies to improve safety and efficiency. Patient stated understanding and was in agreement with POC moving forward.      *post evaluation, patient without respiratory distress upon leaving room; RN Helen Christine notified re: clinical findings and recommendations from the assessment; verbal receptiveness noted        Diet Recommendations:  Soft and bite-size with moderately thick liquids  Strategies:  Full Upright Position, Small Bite/Sip, No Straw, Multiple Swallow, Pulmonary Monitoring, Medications Whole with Puree, Direct 1:1 Supervision, Alternate Solids and Liquids, Limit Distractions and Monitor for Fatigue   Rehabilitation Potential: fair    EDUCATION:  Learner: Patient  Education:  Reviewed results and recommendations of this evaluation, Reviewed diet and strategies, Reviewed signs, symptoms and risks of aspiration, Reviewed ST goals and Plan of Care, Education Related to Potential Risks and Complications Due to Impairment/Illness/Injury, Education Related to Prevention of Recurrence of Impairment/Illness/Injury and Education Related to [employer] and Wellness  Evaluation of Education: Verbalizes understanding, Demonstrates with assistance, Needs further instruction and Family not present    PLAN:  Skilled SLP intervention on acute care 3-5 x per week or until goals met and/or pt plateaus in function. Specific interventions for next session may include: PO trials and review swallow strategies. PATIENT GOAL:    Did not state. Will further assess during treatment. SHORT TERM GOALS:  Short-term Goals  Timeframe for Short-term Goals: 2 weeks  Goal 1: Patient will safely consume a soft and bite sized diet + moderately thick liquids (avoid mixed consistencies) with direct 1:1 supervision while implementing recommended swallow strategies without overt s/s of aspiration to maximize nutrition/hydration levels. Goal 2: Patient will complete advanced PO trials to with adeqaute textural breakdown, cohesive bolus formation, timely AP transit and no overt s/s of aspiration to determine readiness for dietary upgrade. Goal 3: Patient will complete pharyngeal strengthening exercises (effortful, beverly, CTAR) x10 with good success to improve strength of swallow function.       LONG TERM GOALS:  No long term goals recommended d/t short LETTY Lopez ([facility] [address], [address]

## 2021-12-11 NOTE — PROGRESS NOTES
12/10/21 2020   Provider Notification   Reason for Communication Patient request   Provider Name Dr Mcgregor Alon    Provider Notification Physician   Method of Communication Secure Message     Pt admitted on 12/3 COPD exac. She is NPO pending swallow eval tomorrow am. She usually takes melatonin and tylenol to help her rest. She is very anxious and tearful at the moment. Could we try a small dose of ativan to see if that will help her relax?  Thanks

## 2021-12-11 NOTE — DISCHARGE SUMMARY
Hospital Medicine Discharge Summary      Patient Identification:   Cleve Mendoza   : 1940  MRN: [id number]   Account: [de-identified]      Patient's PCP: Ashley Pastor DO    Admit Date: 12/3/2021     Discharge Date:  2021    Admitting Physician: Shlomo Mirza DO     Discharge Physician: Chandler Koyanagi, MD     Discharge Diagnoses: Active Hospital Problems    Diagnosis Date Noted    Acute on chronic respiratory failure with hypoxia (HCC) [J96.21]     COPD exacerbation ([facility] [address] [J44.1] 2021    Acute on chronic diastolic congestive heart failure ([facility] [address] [I50.33]        The patient was seen and examined on day of discharge and this discharge summary is in conjunction with any daily progress note from day of discharge. Hospital Course:     Per H&P, \"Katharine Costa is a 78 y. o. female w/siginificant PMHx of HTN, CAD, A fib not on [address] [place] [address] Road, HLD, CKD, hx of CVA w/residual b/l LE weakness, Intracranial hemorrhage 2/2 to mechanical fall in , Hypothyroidism, COPD, and chronic hypoxic respiratory failure who presented to the [facility] ED via EMS from the [address] [place] [address] for progressively worsening SOB, nasal congestion, non-productive cough, and mucopurulent sputum over the course of approximately 1 week. Pt states that she began having a greater difficulty breathing last night that acutely worsened her SOB to where she felt air hungry and anxious. Pt normally on [facility] and was increased to [facility] for the SOB and was noted to have an O2 sat of 84% at the NH while this incident was occurring. Pt denies CP, abdominal pain, fever/chills/rigors, N/V.      Presented via EMS, O2 sats in 80's on RA 4LNC O2 sat 84%, placed on NRB at 10L then weaned back down to 4LNC, /85, RR 24. Noted to have Trops x1 @ 0.018, pro-BNP 4260, ABG demonstrates pH 7.38, pCO2 52, pO2 74, HCO3 30 Respiratory Acidosis w/full metabolic compensation. COVID-19 and Influenza A/B negative, procal 0.09, WBC 10.9, LA wnl. Received Decadron and DuoNebs in ED. Being admitted for further evaluation and w/u.      Of note, pt becoming a little dry, Na increasing, BUN and Cr increasing, Serum Osm >300, GFR decreasing, ordered urine Na / Osm and started pt on NS @75cc/hr. Patient was diureses with lasix and metolazone, lost 20 lbs and shortness of breath has improved significantly, and back to 2 lpm nasal cannula which was her baseline. However, she developed TYRON, so lisinopril, metolazone and lasix were held until renal function improved. She started having leukocytosis, afebrile, which was thought to be due to steroids. Rpt UA normal. Rpt CXR shows Bandlike opacities at the bilateral lung bases as evidence for atelectasis or infiltrate. Procal elevated. omicef switched to augmentin due to silent aspiration with feeds. R knee was also warm and painful to touch. Family at bedside states hx of OA. She also has hx of gout on allopurinol, which was also held due to TYRON. MBS done, gross silent aspiration of mildly thick liquids via cup sip in conjunction with bolus hold. Recommended soft and bite-size with moderately thick liquids. At this time, it is recommended patient resume a soft and bite-size diet with moderately thick liquids given DIRECT 1:1 ASSISTANCE to carryover SMALL, SINGLE CUP SIPS to prevent airway invasion events. This was discussed to  and son at bedside, that there is potential for recurrent aspiration pneumonia. She going back to [facility] but has a high likelihood of readmission again. Currently she is DNR [facility]. Recommended palliative care to follow to ECF, with consideration of hospice in the future. Discharged stable with fair prognosis.     See detailed Treatment course:     Acute on chronic hypoxic respiratory failure, improved  - Presented with worsening of chronic hypoxia - normal 2-4L NC, but needed 6L on admissiom, back to 2-3 lpm  - resume Lasix 20 mg PO, resume metolazone on Monday  - May Continue home Pepcid     R knee osteoarthritis  Gout  - Holding home Allopurinol due to TYRON, resume in 5 days  - prednisone x 5 days    Debility/deconditioning  - ECF placement, PT/OT/SLP    Exam:     Vitals:  Vitals:    12/11/21 0328 12/11/21 0834 12/11/21 0840 12/11/21 1141   BP: 128/77 114/64  122/66   Pulse: 96 99  103   Resp: 18 20 20 20   Temp: 98.3 °F (36.8 °C) 98.5 °F (36.9 °C)  98.2 °F (36.8 °C)   TempSrc: Oral Oral  Oral   SpO2: 95% 93% 93% 93%   Weight: 214 lb 11.7 oz (97.4 kg)      Height:         Weight: Weight: 214 lb 11.7 oz (97.4 kg)     24 hour intake/output:    Intake/Output Summary (Last 24 hours) at 12/11/2021 1527  Last data filed at 12/11/2021 1423  Gross per 24 hour   Intake 400 ml   Output 500 ml   Net -100 ml         General appearance:  No apparent distress, appears stated age and cooperative. Appears chronically ill, deconditioned  HEENT:  Normal cephalic, atraumatic without obvious deformity. Pupils equal, round, and reactive to light. Extra ocular muscles intact. Conjunctivae/corneas clear. Neck: Supple, with full range of motion. No jugular venous distention. Trachea midline. Respiratory:  Normal respiratory effort. Clear to auscultation, bilaterally without Rales/Wheezes/Rhonchi. Cardiovascular:  Regular rate and rhythm with normal S1/S2 without murmurs, rubs or gallops. Abdomen: Soft, non-tender, non-distended with normal bowel sounds. Musculoskeletal: trace bipedal edema, mild swelling, warmth and tender to touch R knee  Skin: Skin color, texture, turgor normal.  No rashes or lesions. Neurologic:  Neurovascularly intact without any focal sensory/motor deficits. Cranial nerves: II-XII intact, grossly non-focal.  Psychiatric:  Alert and oriented, thought content appropriate, normal insight  Capillary Refill: Brisk,< 3 seconds   Peripheral Pulses: +2 palpable, equal bilaterally       Labs:  For convenience and continuity at follow-up the following most recent labs are provided:      CBC:    Lab Results   Component Value Date    WBC 15.4 12/11/2021    HGB 13.5 12/11/2021    HCT 42.9 12/11/2021     12/11/2021       Renal:    Lab Results   Component Value Date     12/11/2021    K 4.0 12/11/2021    K 4.9 12/04/2021    CL 99 12/11/2021    CO2 26 12/11/2021    BUN 66 12/11/2021    CREATININE 1.4 12/11/2021    CALCIUM 9.8 12/11/2021    PHOS 3.2 04/17/2017         Significant Diagnostic Studies    Radiology:   FL MODIFIED BARIUM SWALLOW W VIDEO   Final Result   FINDINGS/IMPRESSION:      Fluoroscopic support was provided for speech pathology. There is reported to be laryngeal penetration with thin, nectar and honey consistency barium and aspiration with thin and nectar consistency barium. Please refer to the speech pathology report for    further information. **This report has been created using voice recognition software. It may contain minor errors which are inherent in voice recognition technology. **      Final report electronically signed by Dr. Melchor Snell MD on 12/11/2021 2:11 PM      XR CHEST PORTABLE   Final Result   Bandlike opacities at the bilateral lung bases as evidence for atelectasis or infiltrate. **This report has been created using voice recognition software. It may contain minor errors which are inherent in voice recognition technology. **      Final report electronically signed by Dr. Melchor Snell MD on 12/11/2021 11:04 AM      Fluoroscopy modified barium swallow with video   Final Result   1. Laryngeal penetration of honey thick, nectar thick, thin and soft barium with aspiration of thin barium. 2. Additional recommendations from the speech therapist will follow. **This report has been created using voice recognition software. It may contain minor errors which are inherent in voice recognition technology. **         Final report electronically signed by Dr. Walter Cullen on 12/6/2021 10:39 AM      XR CHEST PORTABLE   Final Result   Cardiomegaly. No other acute cardiopulmonary disease retrocardiac infiltrates cannot be excluded. **This report has been created using voice recognition software. It may contain minor errors which are inherent in voice recognition technology. **      Final report electronically signed by Dr. Humberto Valentin on 12/3/2021 6:10 PM             Consults:     None    Disposition:    [] Home       [] TCU       [] Rehab       [] Psych       [x] SNF       [] [facility]       [] Other-    Condition at Discharge: Stable    Code Status:  Limited     Patient Instructions:    Discharge lab work: CBC, BMP  Activity: activity as tolerated  Diet: ADULT DIET; Dysphagia - Soft and Bite Sized;  Moderately Thick (Honey)      Follow-up visits:   CM STR [facility]  [address] [address] [phone]             Discharge Medications:        Medication List      START taking these medications    amoxicillin-clavulanate 875-125 MG per tablet  Commonly known as: AUGMENTIN  Take 1 tablet by mouth every 12 hours for 14 doses     diclofenac sodium 1 % Gel  Commonly known as: VOLTAREN  Apply 4 g topically 2 times daily     furosemide 20 MG tablet  Commonly known as: LASIX  Take 1 tablet by mouth daily  Start taking on: December 12, 2021     lactobacillus capsule  Take 1 capsule by mouth 2 times daily (with meals) for 14 days     predniSONE 20 MG tablet  Commonly known as: DELTASONE  Take 1 tablet by mouth daily for 5 days        CHANGE how you take these medications    allopurinol 100 MG tablet  Commonly known as: ZYLOPRIM  Take 1 tablet by mouth daily Resume 5 days from now  What changed: additional instructions     doxepin 10 MG capsule  Commonly known as: SINEQUAN  Take 1 capsule by mouth nightly Indications: prn for sleep  What changed: how much to take     metOLazone 5 MG tablet  Commonly known as: ZAROXOLYN  Take 1 tablet by mouth daily Start in 2 days  What changed: additional instructions        CONTINUE taking these medications    Acetaminophen 650 MG Tabs  Take 650 mg by mouth every 4 hours as needed     amLODIPine 5 MG tablet  Commonly known as: NORVASC     aspirin 81 MG EC tablet     famotidine 20 MG tablet  Commonly known as: PEPCID  Take 1 tablet by mouth nightly     folic acid 1 MG tablet  Commonly known as: FOLVITE     ipratropium-albuterol 0.5-2.5 (3) MG/3ML Soln nebulizer solution  Commonly known as: DUONEB  Inhale 3 mLs into the lungs every 4 hours (while awake) for 5 days     levothyroxine 175 MCG tablet  Commonly known as: SYNTHROID     melatonin 1 MG tablet     metoprolol 100 MG tablet  Commonly known as: LOPRESSOR     oxybutynin 5 MG tablet  Commonly known as: DITROPAN     potassium chloride 20 MEQ extended release tablet  Commonly known as: KLOR-CON M     vitamin E 400 UNIT capsule        STOP taking these medications    lisinopril 10 MG tablet  Commonly known as: PRINIVIL;ZESTRIL           Where to Get Your Medications      Information about where to get these medications is not yet available    Ask your nurse or doctor about these medications  · allopurinol 100 MG tablet  · amoxicillin-clavulanate 875-125 MG per tablet  · diclofenac sodium 1 % Gel  · doxepin 10 MG capsule  · furosemide 20 MG tablet  · lactobacillus capsule  · metOLazone 5 MG tablet  · predniSONE 20 MG tablet         Time Spent on discharge is more than 45 minutes in the examination, evaluation, counseling and review of medications and discharge plan. Signed: Thank you Rosa Isela Marie DO for the opportunity to be involved in this patient's care.     Electronically signed by Danica Wallace MD on 12/11/2021 at 3:27 PM

## 2021-12-13 NOTE — CARE COORDINATION
12/13/21, 7:11 AM EST    Patient goals/plan/ treatment preferences discussed by  and . Patient goals/plan/ treatment preferences reviewed with patient/ family. Patient/ family verbalize understanding of discharge plan and are in agreement with goal/plan/treatment preferences. Understanding was demonstrated using the teach back method. AVS provided by RN at time of discharge, which includes all necessary medical information pertaining to the patients current course of illness, treatment, post-discharge goals of care, and treatment preferences. IMM Letter  IMM Letter given to Patient/Family/Significant other/Guardian/POA/by[de-identified] Copy delivered to patient by Lily Hawkins  IMM Letter date given[de-identified] 12/10/21  IMM Letter time given[de-identified] 3436       Timmy Mendosa was discharged to the [address] [place] [address] last night where she will be skilled under her Medicare benefit. She will be transported by ambulance. Discharge instructions and blue envelope are placed on the chart.  RN called report to facility.     -Social work student Job Dorman

## 2025-03-21 NOTE — PROGRESS NOTES
[facility] ICU 4D  Speech - Language - Cognitive Evaluation    SLP Individual Minutes  Time In: 7414  Time Out: 3500  Minutes: 15  Timed Code Treatment Minutes: 0 Minutes       Date: 2020  Patient Name: Jagdish Spears      CSN: [id number]   : 1940  (78 y.o.)  Gender: female   Referring Physician:  Ra Montalvo PA-C  Diagnosis: Intraventricular Hemorrhage  Secondary Diagnosis: Cognitive linguistic deficits   Precautions: Fall risk   History of Present Illness/Injury: Pt admitted with above dx s/p fall at home; per chart review, pt has had 4 falls in 3 weeks. Please refer to H&P for complete hx. ST consulted to assess cognitive linguistic functioning given dx to determine need for addition of cognitive POC. Past Medical History:   Diagnosis Date    Arthritis     both hands    CAD (coronary artery disease)     [facility]erebral artery occlusion with cerebral infarction ([facility] [address]     Chronic kidney disease     GERD (gastroesophageal reflux disease)     History of blood transfusion     Hyperlipidemia     Hypertension     Movement disorder     Pneumonia     Thyroid disease        Pain: No pain reported. Subjective:  Pt laying in bed upon ST arrival. Alert and pleasant.  and son present. SOCIAL HISTORY: Pt currently lives at home with . Pt has low cognitive demand at home;  completes all IADL's (finances, medication management, cooking, cleaning). Per  report, pt \"gets up to the restroom and the dinner table that's about it\". Pt is not an active . Pt is retired from [employer] work.        ORAL MOTOR:  Facial / Labial WFL    Lingual WFL    Dentition WFL    Velum WFL    Vocal Quality WFL    Sensation WFL    Cough Not Tested        SPEECH / VOICE:  Speech and Voice appear to be grossly intact for basic and complex daily communication    LANGUAGE:  Receptive:  1 Step Commands: 2/2  2 Step Commands: 1/2  Simple Yes/No Questions: Catheter removed intact. 3/3  Complex Yes/No Questions: 2/3  Identify Objects/Pictures: 3/3    Expressive:  Automatic Speech: WFL  Confrontational Naming: 3/3  Responsive Namin/2  Divergent Naming (abstract): 4 in 1 minute (normal is 11 or greater)  Sentence Formulation: WFL  Conversational Speech: WFL  Repetition: 1/2 sentence level    COGNITION:  Page Cognitive Assessment (MOCA) version 8.3 completed. Pt scored 12/30. Normal is greater than or equal to 26/30. Orientation: 6  Immediate Recall: 1/5  Short-Term Recall: 0/5  Problem Solving: 3/3 hazard identification  Sequencing: 3/ ADL task  Attention:  sustained attention  Math Computation: 0/2 serial math configuration  Executive Functionin/5 visuospatial reasoning    SWALLOWING:  Current Diet: General, Thin liquids   WNL     RECOMMENDATIONS/ASSESSMENT:  DIAGNOSTIC IMPRESSIONS:  Pt presents with a moderate cognitive impairment characterized by the findings outlined above. Specific deficits noted in immediate and delayed recall, working memory, attention, complex reasoning, and visual executive functioning. Expressive and receptive language at least mildly impaired, characterized by decreased divergent naming, slow processing speed, and decreased ability with executing 2-step commands. Pt has very low cognitive demand at home;  completes all IADL's. Pt, , and son all indicate pt is at baseline level cognitively and has had no noted changes related to mentation. Skilled ST services are not indicated at this time d/t pt's cognition appearing back at [employer]. Discussed HEP for keeping brain active; family and pt receptive. Please re-consult should any cognitive linguistic changes arise.    Rehabilitation Potential: Fair    EDUCATION:  Learner: Patient, Significant Other and Family  Education:  Reviewed results and recommendations of this evaluation and Reviewed recommendations for follow-up  Evaluation of Education: Verbalizes understanding    PLAN:  No further